# Patient Record
Sex: FEMALE | Race: WHITE | NOT HISPANIC OR LATINO | Employment: OTHER | URBAN - METROPOLITAN AREA
[De-identification: names, ages, dates, MRNs, and addresses within clinical notes are randomized per-mention and may not be internally consistent; named-entity substitution may affect disease eponyms.]

---

## 2017-01-18 ENCOUNTER — TRANSCRIBE ORDERS (OUTPATIENT)
Dept: MAMMOGRAPHY | Facility: HOSPITAL | Age: 62
End: 2017-01-18

## 2017-01-18 DIAGNOSIS — Z12.31 VISIT FOR SCREENING MAMMOGRAM: Primary | ICD-10-CM

## 2017-01-21 PROCEDURE — 87186 SC STD MICRODIL/AGAR DIL: CPT | Performed by: FAMILY MEDICINE

## 2017-01-21 PROCEDURE — 87077 CULTURE AEROBIC IDENTIFY: CPT | Performed by: FAMILY MEDICINE

## 2017-01-21 PROCEDURE — 87086 URINE CULTURE/COLONY COUNT: CPT | Performed by: FAMILY MEDICINE

## 2017-02-10 ENCOUNTER — HOSPITAL ENCOUNTER (OUTPATIENT)
Dept: MAMMOGRAPHY | Facility: HOSPITAL | Age: 62
Discharge: HOME | End: 2017-02-10
Attending: OBSTETRICS & GYNECOLOGY | Admitting: OBSTETRICS & GYNECOLOGY

## 2017-02-10 DIAGNOSIS — Z12.31 VISIT FOR SCREENING MAMMOGRAM: ICD-10-CM

## 2017-02-10 PROCEDURE — 77067 SCR MAMMO BI INCL CAD: CPT | Performed by: RADIOLOGY

## 2017-02-10 PROCEDURE — 77063 BREAST TOMOSYNTHESIS BI: CPT

## 2017-02-10 PROCEDURE — G0202 SCR MAMMO BI INCL CAD: HCPCS

## 2017-02-10 PROCEDURE — 77063 BREAST TOMOSYNTHESIS BI: CPT | Performed by: RADIOLOGY

## 2018-02-26 ENCOUNTER — TRANSCRIBE ORDERS (OUTPATIENT)
Dept: ADMINISTRATIVE | Facility: HOSPITAL | Age: 63
End: 2018-02-26

## 2018-02-26 DIAGNOSIS — Z12.31 VISIT FOR SCREENING MAMMOGRAM: Primary | ICD-10-CM

## 2018-03-13 ENCOUNTER — HOSPITAL ENCOUNTER (OUTPATIENT)
Dept: MAMMOGRAPHY | Facility: HOSPITAL | Age: 63
Discharge: HOME | End: 2018-03-13
Attending: INTERNAL MEDICINE | Admitting: OBSTETRICS & GYNECOLOGY

## 2018-03-13 DIAGNOSIS — Z12.31 VISIT FOR SCREENING MAMMOGRAM: ICD-10-CM

## 2018-03-13 PROCEDURE — 77063 BREAST TOMOSYNTHESIS BI: CPT

## 2018-03-13 PROCEDURE — 77063 BREAST TOMOSYNTHESIS BI: CPT | Performed by: RADIOLOGY

## 2018-03-13 PROCEDURE — 77067 SCR MAMMO BI INCL CAD: CPT

## 2018-03-13 PROCEDURE — 77067 SCR MAMMO BI INCL CAD: CPT | Performed by: RADIOLOGY

## 2018-05-10 ENCOUNTER — TRANSCRIBE ORDERS (OUTPATIENT)
Dept: ADMINISTRATIVE | Facility: HOSPITAL | Age: 63
End: 2018-05-10

## 2018-05-10 ENCOUNTER — HOSPITAL ENCOUNTER (OUTPATIENT)
Dept: GENERAL RADIOLOGY | Facility: HOSPITAL | Age: 63
Discharge: HOME | End: 2018-05-10
Attending: INTERNAL MEDICINE | Admitting: INTERNAL MEDICINE

## 2018-05-10 DIAGNOSIS — R07.89 CHEST WALL PAIN: ICD-10-CM

## 2018-05-10 DIAGNOSIS — R07.81 RIB PAIN ON LEFT SIDE: ICD-10-CM

## 2018-05-10 DIAGNOSIS — R05.9 COUGH: Primary | ICD-10-CM

## 2018-05-10 PROCEDURE — 71100 X-RAY EXAM RIBS UNI 2 VIEWS: CPT

## 2018-05-10 PROCEDURE — 71046 X-RAY EXAM CHEST 2 VIEWS: CPT

## 2018-10-16 ENCOUNTER — OFFICE VISIT (OUTPATIENT)
Dept: FAMILY MEDICINE CLINIC | Facility: CLINIC | Age: 63
End: 2018-10-16

## 2018-10-16 VITALS
HEART RATE: 78 BPM | SYSTOLIC BLOOD PRESSURE: 120 MMHG | BODY MASS INDEX: 26.05 KG/M2 | OXYGEN SATURATION: 98 % | HEIGHT: 70 IN | DIASTOLIC BLOOD PRESSURE: 80 MMHG | WEIGHT: 182 LBS

## 2018-10-16 DIAGNOSIS — Z23 NEED FOR HEPATITIS A VACCINATION: ICD-10-CM

## 2018-10-16 DIAGNOSIS — I10 ESSENTIAL HYPERTENSION: Primary | ICD-10-CM

## 2018-10-16 DIAGNOSIS — R10.9 LEFT SIDED ABDOMINAL PAIN: ICD-10-CM

## 2018-10-16 DIAGNOSIS — F32.5 MAJOR DEPRESSION IN COMPLETE REMISSION (HCC): ICD-10-CM

## 2018-10-16 PROCEDURE — 90471 IMMUNIZATION ADMIN: CPT | Performed by: FAMILY MEDICINE

## 2018-10-16 PROCEDURE — 90632 HEPA VACCINE ADULT IM: CPT | Performed by: FAMILY MEDICINE

## 2018-10-16 PROCEDURE — 99204 OFFICE O/P NEW MOD 45 MIN: CPT | Performed by: FAMILY MEDICINE

## 2018-10-16 RX ORDER — LOSARTAN POTASSIUM 50 MG/1
50 TABLET ORAL DAILY
Qty: 90 TABLET | Refills: 1 | Status: SHIPPED | OUTPATIENT
Start: 2018-10-16 | End: 2019-04-16 | Stop reason: SDUPTHER

## 2018-10-16 RX ORDER — ESCITALOPRAM OXALATE 20 MG/1
20 TABLET ORAL DAILY
Qty: 90 TABLET | Refills: 1 | Status: SHIPPED | OUTPATIENT
Start: 2018-10-16 | End: 2019-04-16 | Stop reason: SDUPTHER

## 2018-10-16 RX ORDER — LOSARTAN POTASSIUM 50 MG/1
50 TABLET ORAL DAILY
COMMUNITY
End: 2018-10-16 | Stop reason: SDUPTHER

## 2018-10-16 NOTE — PROGRESS NOTES
Keshav Gu presents today to establish care.    Chief Complaint   Patient presents with   • Establish Care   • Med Refill     needs Lexapro and Losartan        Depression   Visit Type: initial  Onset of symptoms: more than 5 years ago  Progression since onset: controlled  Patient presents with the following symptoms: weight gain.  Patient is not experiencing: anhedonia, depressed mood and palpitations.  Treatment tried: SSRI  Compliance with treatment: good  Improvement on treatment: significant      Hypertension   This is a chronic problem. Episode onset: 2 years. The problem is controlled. Pertinent negatives include no chest pain or palpitations. Risk factors for coronary artery disease include family history. Current antihypertension treatment includes angiotensin blockers.      Taking Lexapro 10 years. Started out on something else but had strange reaction, had vision changes.     PHQ-2/PHQ-9 Depression Screening 10/16/2018   Little interest or pleasure in doing things 0   Feeling down, depressed, or hopeless 0   Total Score 0     HTN:  Daily walks dog in Summer, 30 minutes. Works out with  once a week some cardio and weights. Careful about what she eats, mostly plant based diet and fish. Avoids red meat. When she was younger had palpitations, possibly due to anxiety. Prior testing Holter monitor 20-30 years ago normal twice. Family history mother with heart disease, she had additional testing 10 years ago and was fine.     Overall high cholesterol, good cholesterol was also high. Never treated with cholesterol medication.     Last March started severe pains in left side. Had xrays no problems. Pain went away. Thought could be Shingles, but never had rash. Recently travelled to [place], little bit of soreness on left side.           Review of Systems   Constitutional: Positive for unexpected weight gain.   HENT: Negative.    Eyes: Negative.    Respiratory: Negative.    Cardiovascular:  Negative.  Negative for chest pain and palpitations.   Gastrointestinal: Negative.    Endocrine: Negative.    Genitourinary: Negative.    Musculoskeletal: Negative.    Skin: Negative.    Neurological: Negative.    Hematological: Negative.    Psychiatric/Behavioral: Negative.  Negative for depressed mood.        Past Medical History:   Diagnosis Date   • Basal cell carcinoma     right cheek, left chest, left arm   • Colon polyp    • Depression    • Frequent UTI    • Hyperlipidemia    • Hypertension    • Interstitial cystitis    • Migraine headache         History reviewed. No pertinent surgical history.     Family History   Problem Relation Age of Onset   • Hyperlipidemia Mother    • Heart attack Mother    • Colon cancer Mother    • Mental illness Mother    • Arthritis Mother    • Stomach cancer Father    • Hypertension Father    • Stroke Maternal Grandmother    • Stroke Maternal Grandfather    • Stroke Paternal Grandmother    • Stroke Paternal Grandfather    • Breast cancer Neg Hx    • Ovarian cancer Neg Hx         Social History     Social History   • Marital status:      Spouse name: N/A   • Number of children: N/A   • Years of education: N/A     Occupational History   •       non-profit consulting     Social History Main Topics   • Smoking status: Never Smoker   • Smokeless tobacco: Never Used   • Alcohol use 0.6 oz/week     1 Glasses of wine per week   • Drug use: No   • Sexual activity: Defer     Other Topics Concern   • Not on file     Social History Narrative   • No narrative on file        Current Outpatient Prescriptions   Medication Sig Dispense Refill   • escitalopram (LEXAPRO) 20 MG tablet Take 20 mg by mouth Daily.     • losartan (COZAAR) 50 MG tablet Take 50 mg by mouth Daily.       No current facility-administered medications for this visit.        Allergies   Allergen Reactions   • Bactrim [Sulfamethoxazole-Trimethoprim] Hives   • Macrobid [Nitrofurantoin Macrocrystal] Itching and Swelling     "    Visit Vitals  /80 (BP Location: Left arm, Patient Position: Sitting, Cuff Size: Adult)   Pulse 78   Ht 177.8 cm (70\")   Wt 82.6 kg (182 lb)   SpO2 98%   BMI 26.11 kg/m²        Physical Exam   Constitutional: She is oriented to person, place, and time. No distress.   HENT:   Nose: Nose normal.   Mouth/Throat: Oropharynx is clear and moist.   Eyes: Pupils are equal, round, and reactive to light. Conjunctivae are normal.   Neck: Neck supple. No thyromegaly present.   Cardiovascular: Normal rate and regular rhythm.    No murmur heard.  Pulses:       Posterior tibial pulses are 2+ on the right side, and 2+ on the left side.   Pulmonary/Chest: Effort normal and breath sounds normal.   Abdominal: Soft. There is no hepatosplenomegaly. There is no tenderness.   Lymphadenopathy:     She has no cervical adenopathy.   Neurological: She is alert and oriented to person, place, and time.   Skin: Skin is warm and dry. No rash noted.   Psychiatric: She has a normal mood and affect. Her behavior is normal. Judgment and thought content normal.   Vitals reviewed.          Immunization History   Administered Date(s) Administered   • Hepatitis A 10/16/2018         Keshav was seen today for establish care and med refill.  Obtain prior records and immunizations.  Obtain recent labs earlier this year.  Diagnoses and all orders for this visit:    Essential hypertension  -     losartan (COZAAR) 50 MG tablet; Take 1 tablet by mouth Daily.  Stable.  Continue losartan.  Check fasting labs at next appointment.  Major depression in complete remission (CMS/HCC)  -     escitalopram (LEXAPRO) 20 MG tablet; Take 1 tablet by mouth Daily.  Stable.  Continue Lexapro.  Left sided abdominal pain  Recurrent. Normal exam.  Unclear etiology.  Need for hepatitis A vaccination  -     Hepatitis A Vaccine Adult IM        Return in about 6 months (around 4/16/2019) for Follow-up.  "

## 2018-10-17 ENCOUNTER — TELEPHONE (OUTPATIENT)
Dept: FAMILY MEDICINE CLINIC | Facility: CLINIC | Age: 63
End: 2018-10-17

## 2018-10-17 NOTE — TELEPHONE ENCOUNTER
----- Message from Vonda Alex MD sent at 10/16/2018  9:39 AM EDT -----  Regarding: req records  Last office note  Priya Kramer MD Consulting Physician Dermatology 10/16/2018 End  10/16/18  Phone: [phone]; Fax: [phone]    Last office note, pap, labs.   Juana Adams MD Consulting Physician Obstetrics and Gynecology 10/16/2018 End  10/16/18  Phone: [phone]; Fax: [phone]    Last office note, labs.   Edwin Jewell Jr., MD Consulting Physician Urology 10/16/2018 End  10/16/18  Phone: [phone]; Fax: [phone]    Last colonoscopy and pathology.   Gabriel Bae MD Consulting Physician Gastroenterology 10/16/2018 End  10/16/18  Phone: [phone]; Fax: [phone]    Immunizations at [facility]

## 2018-11-09 ENCOUNTER — HOSPITAL ENCOUNTER (OUTPATIENT)
Dept: GENERAL RADIOLOGY | Facility: HOSPITAL | Age: 63
Discharge: HOME | End: 2018-11-09
Admitting: FAMILY MEDICINE

## 2018-11-09 ENCOUNTER — OFFICE VISIT (OUTPATIENT)
Dept: FAMILY MEDICINE CLINIC | Facility: CLINIC | Age: 63
End: 2018-11-09

## 2018-11-09 VITALS
OXYGEN SATURATION: 98 % | DIASTOLIC BLOOD PRESSURE: 78 MMHG | HEART RATE: 67 BPM | HEIGHT: 70 IN | WEIGHT: 178 LBS | SYSTOLIC BLOOD PRESSURE: 116 MMHG | BODY MASS INDEX: 25.48 KG/M2

## 2018-11-09 DIAGNOSIS — M79.89 SWELLING OF LEFT FOOT: Primary | ICD-10-CM

## 2018-11-09 DIAGNOSIS — M79.89 SWELLING OF LEFT FOOT: ICD-10-CM

## 2018-11-09 PROCEDURE — 99214 OFFICE O/P EST MOD 30 MIN: CPT | Performed by: FAMILY MEDICINE

## 2018-11-09 PROCEDURE — 73630 X-RAY EXAM OF FOOT: CPT

## 2018-11-09 NOTE — PROGRESS NOTES
Chief Complaint   Patient presents with   • Foot Swelling     left foot pain on top        Lower Extremity Issue   This is a new problem. The current episode started 1 to 4 weeks ago. The problem occurs constantly. The problem has been gradually worsening. Associated symptoms include joint swelling. The symptoms are aggravated by walking. Treatments tried: voltaren gel. The treatment provided moderate relief.    No known injury. Swelling on top of left foot. Fells bony. Dull pain. Worse with walking shoes, can't wear laces too tight. When she flexed her foot and felt a pop. No heat or redness. No h/o similar problems. Tried prescription anti-inflammatory cream helped.              Review of Systems   Musculoskeletal: Positive for joint swelling.   Skin: Negative for color change.          Current Outpatient Prescriptions:   •  escitalopram (LEXAPRO) 20 MG tablet, Take 1 tablet by mouth Daily., Disp: 90 tablet, Rfl: 1  •  losartan (COZAAR) 50 MG tablet, Take 1 tablet by mouth Daily., Disp: 90 tablet, Rfl: 1     Allergies   Allergen Reactions   • Bactrim [Sulfamethoxazole-Trimethoprim] Hives   • Macrobid [Nitrofurantoin Macrocrystal] Itching and Swelling       Past Medical History:   Diagnosis Date   • Basal cell carcinoma     right cheek, left chest, left arm   • Colon polyp 12/01/2014   • Depression    • Frequent UTI    • Hyperlipidemia    • Hypertension    • Interstitial cystitis    • Migraine headache         No past surgical history on file.     Family History   Problem Relation Age of Onset   • Hyperlipidemia Mother    • Heart attack Mother    • Colon cancer Mother    • Mental illness Mother    • Arthritis Mother    • Heart disease Mother         hypertrophic cardiomyopathy   • Stomach cancer Father    • Hypertension Father    • Stroke Maternal Grandmother    • Stroke Maternal Grandfather    • Stroke Paternal Grandmother    • Stroke Paternal Grandfather    • Breast cancer Neg Hx    • Ovarian cancer Neg Hx      "    Social History     Social History   • Marital status:      Spouse name: N/A   • Number of children: N/A   • Years of education: N/A     Occupational History   •       non-profit consulting     Social History Main Topics   • Smoking status: Never Smoker   • Smokeless tobacco: Never Used   • Alcohol use 0.6 oz/week     1 Glasses of wine per week   • Drug use: No   • Sexual activity: Defer     Other Topics Concern   • Not on file     Social History Narrative   • No narrative on file        Visit Vitals  /78 (BP Location: Left arm, Patient Position: Sitting, Cuff Size: Adult)   Pulse 67   Ht 177.8 cm (70\")   Wt 80.7 kg (178 lb)   SpO2 98%   BMI 25.54 kg/m²        Physical Exam   Constitutional: No distress.   Cardiovascular: Normal rate and regular rhythm.    No murmur heard.  Pulmonary/Chest: Effort normal and breath sounds normal.   Musculoskeletal: She exhibits no edema.        Skin: Skin is warm and dry. No erythema.   Psychiatric: She has a normal mood and affect.   Vitals reviewed.           Keshav was seen today for foot swelling.    Diagnoses and all orders for this visit:    Swelling of left foot  -     XR Foot 3+ View Left; Future    New. Check xray, suspect bone abnormaliy rather than soft tissue. No signs of infection or need for antibiotics. Continue voltaren gel as needed.     Return if symptoms worsen or fail to improve.  "

## 2018-11-12 ENCOUNTER — TELEPHONE (OUTPATIENT)
Dept: FAMILY MEDICINE CLINIC | Facility: CLINIC | Age: 63
End: 2018-11-12

## 2018-11-12 DIAGNOSIS — M79.89 SWELLING OF LEFT FOOT: Primary | ICD-10-CM

## 2018-11-12 NOTE — TELEPHONE ENCOUNTER
Normal xrays and she needs an ultrasound to check swelling of her left mid foot. I do not want a vascular ultrasound, rather a soft tissue ultrasound.

## 2018-11-13 ENCOUNTER — HOSPITAL ENCOUNTER (OUTPATIENT)
Dept: ULTRASOUND IMAGING | Facility: HOSPITAL | Age: 63
Discharge: HOME | End: 2018-11-13
Admitting: FAMILY MEDICINE

## 2018-11-13 DIAGNOSIS — M79.89 SWELLING OF LEFT FOOT: ICD-10-CM

## 2018-11-13 PROCEDURE — 76882 US LMTD JT/FCL EVL NVASC XTR: CPT

## 2018-12-03 DIAGNOSIS — M79.675 PAIN AND SWELLING OF TOE OF LEFT FOOT: Primary | ICD-10-CM

## 2018-12-03 DIAGNOSIS — M79.89 PAIN AND SWELLING OF TOE OF LEFT FOOT: Primary | ICD-10-CM

## 2018-12-12 ENCOUNTER — OFFICE VISIT (OUTPATIENT)
Dept: ORTHOPEDIC SURGERY | Facility: CLINIC | Age: 63
End: 2018-12-12

## 2018-12-12 VITALS — BODY MASS INDEX: 25.25 KG/M2 | HEIGHT: 70 IN | HEART RATE: 74 BPM | WEIGHT: 176.37 LBS | OXYGEN SATURATION: 97 %

## 2018-12-12 DIAGNOSIS — R52 PAIN: Primary | ICD-10-CM

## 2018-12-12 DIAGNOSIS — R22.42 FOOT MASS, LEFT: ICD-10-CM

## 2018-12-12 PROCEDURE — 99203 OFFICE O/P NEW LOW 30 MIN: CPT | Performed by: ORTHOPAEDIC SURGERY

## 2018-12-12 NOTE — PROGRESS NOTES
NEW PATIENT    Patient: Keshav Gu  : 1955    Primary Care Provider: Vonda Garcia MD    Requesting Provider: As above    Pain of the Left Foot      History    Chief Complaint: Left foot mass    History of Present Illness: This is an extremely pleasant 63-year-old woman who is the daughter-in-law of one of my patients.  She has noted a bump on the dorsal aspect of the left foot since about October.  No history of injury.  She reports it really doesn't hurt unless his shoe pushes on it, she rates it as 1 out of 10, aching.  She does not remember having the bump previously, she is not aware of any similar problems in her family.  She is otherwise healthy.  She is a consultant for a nonprofit.  She had an x-ray with a marker on the lesion but it was nonweightbearing, I looked at it in the system and the report, no obvious abnormality in that area.  She also had an ultrasound, that showed a small mixed signal possibly cystic lesion in that area.  Nothing big enough that I think I could aspirate.    Current Outpatient Medications on File Prior to Visit   Medication Sig Dispense Refill   • diclofenac (VOLTAREN) 1 % gel gel Apply 4 g topically to the appropriate area as directed 4 (Four) Times a Day As Needed.     • escitalopram (LEXAPRO) 20 MG tablet Take 1 tablet by mouth Daily. 90 tablet 1   • losartan (COZAAR) 50 MG tablet Take 1 tablet by mouth Daily. 90 tablet 1     No current facility-administered medications on file prior to visit.       Allergies   Allergen Reactions   • Bactrim [Sulfamethoxazole-Trimethoprim] Hives   • Macrobid [Nitrofurantoin Macrocrystal] Itching and Swelling      Past Medical History:   Diagnosis Date   • Basal cell carcinoma     right cheek, left chest, left arm   • Colon polyp 2014   • Depression    • Frequent UTI    • Hyperlipidemia    • Hypertension    • Interstitial cystitis    • Migraine headache      History reviewed. No pertinent surgical  history.  Family History   Problem Relation Age of Onset   • Hyperlipidemia Mother    • Heart attack Mother    • Colon cancer Mother    • Mental illness Mother    • Arthritis Mother    • Heart disease Mother         hypertrophic cardiomyopathy   • Stomach cancer Father    • Hypertension Father    • Stroke Maternal Grandmother    • Stroke Maternal Grandfather    • Stroke Paternal Grandmother    • Stroke Paternal Grandfather    • Breast cancer Neg Hx    • Ovarian cancer Neg Hx       Social History     Socioeconomic History   • Marital status:      Spouse name: Not on file   • Number of children: Not on file   • Years of education: Not on file   • Highest education level: Not on file   Social Needs   • Financial resource strain: Not on file   • Food insecurity - worry: Not on file   • Food insecurity - inability: Not on file   • Transportation needs - medical: Not on file   • Transportation needs - non-medical: Not on file   Occupational History     Comment: non-profit consulting   Tobacco Use   • Smoking status: Never Smoker   • Smokeless tobacco: Never Used   Substance and Sexual Activity   • Alcohol use: Yes     Alcohol/week: 0.6 oz     Types: 1 Glasses of wine per week   • Drug use: No   • Sexual activity: Defer   Other Topics Concern   • Not on file   Social History Narrative   • Not on file        Review of Systems   Constitutional: Negative.    HENT: Positive for congestion, postnasal drip and sinus pressure.    Eyes: Negative.    Respiratory: Negative.    Cardiovascular: Negative.    Gastrointestinal: Negative.    Endocrine: Negative.    Genitourinary: Negative.    Musculoskeletal: Positive for arthralgias.   Skin: Negative.    Allergic/Immunologic: Positive for environmental allergies.   Neurological: Negative.    Hematological: Negative.    Psychiatric/Behavioral: Negative.        The following portions of the patient's history were reviewed and updated as appropriate: allergies, current medications,  "past family history, past medical history, past social history, past surgical history and problem list.    Physical Exam:   Pulse 74   Ht 177.8 cm (70\")   Wt 80 kg (176 lb 5.9 oz)   SpO2 97%   BMI 25.31 kg/m²   GENERAL: Body habitus: normal weight for height    Lower extremity edema: Right: none; Leftt: none    Varicose veins:  Right: none; Left: none    Gait: normal     Mental Status:  awake and alert; oriented to person, place, and time    Voice:  clear  SKIN:  Normal and warm and dry    Hair Growth:  Right:normal; Left:  normal  NAILS: Toenails: normal  HEENT: Head: Normocephalic, atraumatic,  without obvious abnormality.  eye: normal external eye, no icterus  ears: normal external ears  nose: normal external nose  pharynx: dental hygiene adequate  PULM:  Repiratory effort normal  CV:  Dorsalis Pedis:  Right: 2+; Left:2+    Posterior Tibial: Right:2+; Left:2+    Capillary Refill:  Brisk  MSK:  Hand:right handed and sensation intact      Tibia:  Right:  non tender; Left:  non tender      Ankle:  Right: non tender, ROM  normal and symmetric and motor function  normal; Left:  non tender, ROM  normal and symmetric and motor function  normal      Foot:  Right:  No tenderness in the right foot, I can feel a smaller dorsal boss at the proximal base of the first metatarsal; Left:  She has a bony boss at the dorsal base of the first metatarsal, with a rubbery nodule over it.  No Tinel's, no tenderness, it is not adherent to the tendons, it is not pulsatile, no other areas of prominence      NEURO: Heel Walking:  Right:  normal; Left:  normal    Toe Walking:  Right:  normal; Left:  normal     [place] 5.07 monofilament test: normal    Lower extremity sensation: intact     Reflexes:  Biceps:  Right:  1+; Left:  1+           Quads:  Right:  2+; Left:  2+           Ankle:  Right:  1+; Left:  1+      Calf Atrophy:none    Motor Function: all 5/5         Medical Decision Making    Data Review:   ordered and reviewed " x-rays today, reviewed radiology images and reviewed radiology results    Assessment and Plan/ Diagnosis/Treatment options:   1. Foot mass, left  I think this is probably a dorsal boss, coupled with a small capsular cyst.  The standing films show the bony bump on either side of the first tarsometatarsal joint, and there is slight narrowing there, I suspect that the cyst is not a true ganglion but rather capsular cyst which is common.  It is not big enough that I think I could aspirate it.  There is no calcification in the area, nothing ominous.  I think the best thing to do is an MRI.  This will give us a better look at the area, it will tell us if it's a capsular cyst.  She agrees.  I will see her after the MRI.  - MRI Foot Left With & Without Contrast; Future

## 2018-12-20 ENCOUNTER — HOSPITAL ENCOUNTER (OUTPATIENT)
Dept: MRI IMAGING | Facility: HOSPITAL | Age: 63
Discharge: HOME | End: 2018-12-20
Attending: ORTHOPAEDIC SURGERY | Admitting: ORTHOPAEDIC SURGERY

## 2018-12-20 DIAGNOSIS — R22.42 FOOT MASS, LEFT: ICD-10-CM

## 2018-12-20 DIAGNOSIS — R52 PAIN: ICD-10-CM

## 2018-12-20 PROCEDURE — 0 GADOBENATE DIMEGLUMINE 529 MG/ML SOLUTION: Performed by: ORTHOPAEDIC SURGERY

## 2018-12-20 PROCEDURE — 73720 MRI LWR EXTREMITY W/O&W/DYE: CPT

## 2018-12-20 PROCEDURE — A9577 INJ MULTIHANCE: HCPCS | Performed by: ORTHOPAEDIC SURGERY

## 2018-12-20 PROCEDURE — 82565 ASSAY OF CREATININE: CPT

## 2018-12-20 RX ADMIN — GADOBENATE DIMEGLUMINE 15 ML: 529 INJECTION, SOLUTION INTRAVENOUS at 14:17

## 2018-12-21 LAB — CREAT BLDA-MCNC: 0.7 MG/DL (ref 0.6–1.3)

## 2018-12-24 ENCOUNTER — OFFICE VISIT (OUTPATIENT)
Dept: ORTHOPEDIC SURGERY | Facility: CLINIC | Age: 63
End: 2018-12-24

## 2018-12-24 VITALS — BODY MASS INDEX: 25.25 KG/M2 | OXYGEN SATURATION: 98 % | HEART RATE: 90 BPM | HEIGHT: 70 IN | WEIGHT: 176.37 LBS

## 2018-12-24 DIAGNOSIS — M19.072 OSTEOARTHRITIS OF LEFT ANKLE OR FOOT: ICD-10-CM

## 2018-12-24 DIAGNOSIS — R22.42 FOOT MASS, LEFT: Primary | ICD-10-CM

## 2018-12-24 PROCEDURE — 20605 DRAIN/INJ JOINT/BURSA W/O US: CPT | Performed by: ORTHOPAEDIC SURGERY

## 2018-12-24 PROCEDURE — 99213 OFFICE O/P EST LOW 20 MIN: CPT | Performed by: ORTHOPAEDIC SURGERY

## 2018-12-24 RX ORDER — METHYLPREDNISOLONE ACETATE 40 MG/ML
40 INJECTION, SUSPENSION INTRA-ARTICULAR; INTRALESIONAL; INTRAMUSCULAR; SOFT TISSUE
Status: COMPLETED | OUTPATIENT
Start: 2018-12-24 | End: 2018-12-24

## 2018-12-24 RX ORDER — BUPIVACAINE HYDROCHLORIDE 2.5 MG/ML
0.5 INJECTION, SOLUTION INFILTRATION; PERINEURAL
Status: COMPLETED | OUTPATIENT
Start: 2018-12-24 | End: 2018-12-24

## 2018-12-24 RX ADMIN — METHYLPREDNISOLONE ACETATE 40 MG: 40 INJECTION, SUSPENSION INTRA-ARTICULAR; INTRALESIONAL; INTRAMUSCULAR; SOFT TISSUE at 09:20

## 2018-12-24 RX ADMIN — BUPIVACAINE HYDROCHLORIDE 0.5 ML: 2.5 INJECTION, SOLUTION INFILTRATION; PERINEURAL at 09:20

## 2018-12-24 NOTE — PROGRESS NOTES
Procedure   Medium Joint Arthrocentesis  Date/Time: 12/24/2018 9:20 AM  Consent given by: patient  Site marked: site marked  Timeout: Immediately prior to procedure a time out was called to verify the correct patient, procedure, equipment, support staff and site/side marked as required   Supporting Documentation  Indications: pain   Procedure Details  Location: Left Foot TMT.  Preparation: Patient was prepped and draped in the usual sterile fashion  Approach: dorsal  Medications administered: 0.5 mL bupivacaine 0.25 %; 40 mg methylPREDNISolone acetate 40 MG/ML  Analysis: fluid sample sent for laboratory analysis

## 2018-12-24 NOTE — PROGRESS NOTES
ESTABLISHED PATIENT    Patient: Keshav Gu  : 1955    Primary Care Provider: Vonda Garcia MD    Requesting Provider: As above    Follow-up of the Left Foot (After MRI 2018)      History    Chief Complaint: Left foot pain    History of Present Illness: She returns for follow-up after the left foot MRI, 18.  The MRI confirms what I thought, she has a dorsal boss and osteophytes at the first tarsometatarsal joint, with a thin pseudocyst over this.  I discussed this with her and showed her the films.  I explained this is not really a ganglion cyst, it's some thickening over the joint capsule, irritation over the dorsal boss.  We discussed the options.  One thing we could do is simply watch this, we could also inject cyst and the joint, or surgically I could shave the bone down, or I could fuse the joint.    Current Outpatient Medications on File Prior to Visit   Medication Sig Dispense Refill   • diclofenac (VOLTAREN) 1 % gel gel Apply 4 g topically to the appropriate area as directed 4 (Four) Times a Day As Needed.     • escitalopram (LEXAPRO) 20 MG tablet Take 1 tablet by mouth Daily. 90 tablet 1   • losartan (COZAAR) 50 MG tablet Take 1 tablet by mouth Daily. 90 tablet 1     No current facility-administered medications on file prior to visit.       Allergies   Allergen Reactions   • Bactrim [Sulfamethoxazole-Trimethoprim] Hives   • Macrobid [Nitrofurantoin Macrocrystal] Itching and Swelling      Past Medical History:   Diagnosis Date   • Basal cell carcinoma     right cheek, left chest, left arm   • Colon polyp 2014   • Depression    • Frequent UTI    • Hyperlipidemia    • Hypertension    • Interstitial cystitis    • Migraine headache      No past surgical history on file.  Family History   Problem Relation Age of Onset   • Hyperlipidemia Mother    • Heart attack Mother    • Colon cancer Mother    • Mental illness Mother    • Arthritis Mother    • Heart disease Mother  "        hypertrophic cardiomyopathy   • Stomach cancer Father    • Hypertension Father    • Stroke Maternal Grandmother    • Stroke Maternal Grandfather    • Stroke Paternal Grandmother    • Stroke Paternal Grandfather    • Breast cancer Neg Hx    • Ovarian cancer Neg Hx       Social History     Socioeconomic History   • Marital status:      Spouse name: Not on file   • Number of children: Not on file   • Years of education: Not on file   • Highest education level: Not on file   Social Needs   • Financial resource strain: Not on file   • Food insecurity - worry: Not on file   • Food insecurity - inability: Not on file   • Transportation needs - medical: Not on file   • Transportation needs - non-medical: Not on file   Occupational History     Comment: non-profit consulting   Tobacco Use   • Smoking status: Never Smoker   • Smokeless tobacco: Never Used   Substance and Sexual Activity   • Alcohol use: Yes     Alcohol/week: 0.6 oz     Types: 1 Glasses of wine per week   • Drug use: No   • Sexual activity: Defer   Other Topics Concern   • Not on file   Social History Narrative   • Not on file        Review of Systems   Constitutional: Negative.    HENT: Negative.    Eyes: Negative.    Respiratory: Negative.    Cardiovascular: Negative.    Gastrointestinal: Negative.    Endocrine: Negative.    Genitourinary: Negative.    Musculoskeletal: Positive for arthralgias.   Skin: Negative.    Allergic/Immunologic: Negative.    Neurological: Negative.    Hematological: Negative.    Psychiatric/Behavioral: Negative.        The following portions of the patient's history were reviewed and updated as appropriate: allergies, current medications, past family history, past medical history, past social history, past surgical history and problem list.    Physical Exam:   Pulse 90   Ht 177 cm (69.69\")   Wt 80 kg (176 lb 5.9 oz)   SpO2 98%   BMI 25.54 kg/m²   GENERAL: Gait: normal       MSK:  Ankle:  Right: non tender; Left:  " non tender        Foot:  Right:  non tender; Left:  No change in the prominence at the dorsal first tarsometatarsal joint    NEURO Sensation:  intact     Medical Decision Making    Data Review:   reviewed radiology images and reviewed radiology results    Assessment/Plan/Diagnosis/Treatment Options:   1. Foot mass, left  This is a pseudocyst or capsular cyst over osteophytes and a dorsal boss, she does have some underlying arthritis in the first tarsometatarsal joint.  We discussed the options as noted above.  She would like to try injection.  She does not think it hurts enough for surgery.    After discussing the risks (including infection, skin atrophy, etc), time out was called,  the left 1st tarsal metatarsal joint was prepped and using sterile technique injected with 1cc of 0.25% ropivacaine and 1cc (80mg) DepoMedrol.  A band aid was applied.  No complications.       I'll be happy to see her any time      2. Osteoarthritis of left ankle or foot  As above she has some arthritis in the first TMT

## 2019-02-21 ENCOUNTER — TRANSCRIBE ORDERS (OUTPATIENT)
Dept: ADMINISTRATIVE | Facility: HOSPITAL | Age: 64
End: 2019-02-21

## 2019-02-21 DIAGNOSIS — Z12.31 VISIT FOR SCREENING MAMMOGRAM: Primary | ICD-10-CM

## 2019-03-01 ENCOUNTER — OFFICE VISIT (OUTPATIENT)
Dept: FAMILY MEDICINE CLINIC | Facility: CLINIC | Age: 64
End: 2019-03-01

## 2019-03-01 VITALS
HEIGHT: 70 IN | TEMPERATURE: 97.8 F | BODY MASS INDEX: 26.05 KG/M2 | WEIGHT: 182 LBS | HEART RATE: 79 BPM | DIASTOLIC BLOOD PRESSURE: 68 MMHG | SYSTOLIC BLOOD PRESSURE: 116 MMHG | OXYGEN SATURATION: 97 %

## 2019-03-01 DIAGNOSIS — J06.9 ACUTE URI: Primary | ICD-10-CM

## 2019-03-01 DIAGNOSIS — R20.2 PARESTHESIA: ICD-10-CM

## 2019-03-01 PROCEDURE — 99213 OFFICE O/P EST LOW 20 MIN: CPT | Performed by: FAMILY MEDICINE

## 2019-03-01 NOTE — PROGRESS NOTES
Chief Complaint   Patient presents with   • Cough     drainage, hx of bronchitis   • Flank Pain     left side, uncomfortable x 3 months        Cough   This is a new problem. The current episode started in the past 7 days. The problem has been gradually improving. The cough is non-productive. Associated symptoms include nasal congestion, postnasal drip and shortness of breath. Pertinent negatives include no fever or wheezing. Exacerbated by: weather. Treatments tried: claritin. The treatment provided mild relief. Her past medical history is significant for bronchitis.   Flank Pain   This is a recurrent problem. The current episode started more than 1 month ago (3 months). The problem occurs intermittently. The pain is present in the thoracic spine (left ribs). Quality: sensitive. Exacerbated by: tie robe in morning, water in shower. Pertinent negatives include no fever.      Tickle in throat.     Left side more prevalent when gassy. No change in bowel habits. Years ago chiropractor said if spine was straighter than she'd be and inch taller.     Review of Systems   Constitutional: Negative for fever.   HENT: Positive for postnasal drip.    Respiratory: Positive for cough and shortness of breath. Negative for wheezing.    Gastrointestinal: Negative for constipation and diarrhea.   Genitourinary: Positive for flank pain.   Neurological: Positive for headache.        Current Outpatient Medications on File Prior to Visit   Medication Sig Dispense Refill   • diclofenac (VOLTAREN) 1 % gel gel Apply 4 g topically to the appropriate area as directed 4 (Four) Times a Day As Needed.     • escitalopram (LEXAPRO) 20 MG tablet Take 1 tablet by mouth Daily. 90 tablet 1   • losartan (COZAAR) 50 MG tablet Take 1 tablet by mouth Daily. 90 tablet 1     No current facility-administered medications on file prior to visit.        Allergies   Allergen Reactions   • Bactrim [Sulfamethoxazole-Trimethoprim] Hives   • Macrobid [Nitrofurantoin  Macrocrystal] Itching and Swelling       Past Medical History:   Diagnosis Date   • Basal cell carcinoma     right cheek, left chest, left arm   • Colon polyp 12/01/2014   • Depression    • Elevated hemoglobin A1c 01/17/2017    5.7   • Frequent UTI    • Hyperlipidemia    • Hypertension 01/17/2017   • Interstitial cystitis    • Migraine headache 01/31/2014   • Osteopenia    • Plantar fasciitis 06/08/2009   • Scoliosis of thoracic spine 2018    xrays   • T8 vertebral fracture (CMS/HCC) 2010   • Vitamin D deficiency 11/25/2015        Past Surgical History:   Procedure Laterality Date   • SKIN CANCER EXCISION          Family History   Problem Relation Age of Onset   • Hyperlipidemia Mother    • Heart attack Mother    • Colon cancer Mother    • Mental illness Mother    • Arthritis Mother    • Heart disease Mother         hypertrophic cardiomyopathy   • Stomach cancer Father    • Hypertension Father    • Stroke Maternal Grandmother    • Stroke Maternal Grandfather    • Stroke Paternal Grandmother    • Stroke Paternal Grandfather    • Breast cancer Neg Hx    • Ovarian cancer Neg Hx         Social History     Socioeconomic History   • Marital status:      Spouse name: Not on file   • Number of children: Not on file   • Years of education: Not on file   • Highest education level: Not on file   Social Needs   • Financial resource strain: Not on file   • Food insecurity - worry: Not on file   • Food insecurity - inability: Not on file   • Transportation needs - medical: Not on file   • Transportation needs - non-medical: Not on file   Occupational History     Comment: non-profit consulting   Tobacco Use   • Smoking status: Never Smoker   • Smokeless tobacco: Never Used   Substance and Sexual Activity   • Alcohol use: Yes     Alcohol/week: 0.6 oz     Types: 1 Glasses of wine per week   • Drug use: No   • Sexual activity: Defer   Other Topics Concern   • Not on file   Social History Narrative   • Not on file        Visit  "Vitals  /68 (BP Location: Left arm, Patient Position: Sitting, Cuff Size: Adult)   Pulse 79   Temp 97.8 °F (36.6 °C)   Ht 177.8 cm (70\")   Wt 82.6 kg (182 lb)   SpO2 97%   BMI 26.11 kg/m²        Physical Exam   Constitutional: No distress.   HENT:   Right Ear: Ear canal normal. Tympanic membrane is not erythematous and not bulging. A middle ear effusion ( trace, clear) is present.   Left Ear: Ear canal normal. Tympanic membrane is not erythematous and not bulging. A middle ear effusion ( trace, clear) is present.   Nose: Mucosal edema ( R>L) present. No rhinorrhea.   Mouth/Throat: Oropharynx is clear and moist and mucous membranes are normal. No oral lesions. Tonsils are 0 on the right. Tonsils are 0 on the left. No tonsillar exudate.   Cardiovascular: Normal rate and regular rhythm.   No murmur heard.  Pulmonary/Chest: Effort normal and breath sounds normal.       Lymphadenopathy:        Head (right side): No submandibular, no preauricular and no posterior auricular adenopathy present.        Head (left side): No submandibular, no preauricular and no posterior auricular adenopathy present.     She has no cervical adenopathy.   Psychiatric: She has a normal mood and affect. Her behavior is normal. Judgment and thought content normal.   Vitals reviewed.      EXAMINATION: XR CHEST PA AND LATERAL-, XR RIBS 2 VW LEFT- 05/10/2018     INDICATION: R05-Cough; R07.89-Other chest pain      COMPARISON: None     FINDINGS: Cardiac size within normal limits. No focal opacification or  consolidation. No pneumothorax or pleural effusion. Scoliotic curvature  of the thoracolumbar spine again noted.         Separate order for left rib series without displaced rib fracture  identified or acute osseous abnormality of the left chest wall.     IMPRESSION:  No acute cardiopulmonary process with scoliotic curvature of  the thoracolumbar spine. No acute osseous abnormality however  specifically no displaced rib fracture. No " pneumothorax.     D:  05/10/2018  E:  05/10/2018       Keshav was seen today for cough and flank pain.    Diagnoses and all orders for this visit:    Acute URI  New.  Likely viral.  Discussed dextromethorphan containing cough syrup to help suppress cough or a prescription for Tessalon Perles.  No need for antibiotics at this time.  Paresthesia  Chronic.  Reviewed x-rays which did not show any abnormality in the area of concern, it did however mention mild scoliosis.  Recommend trial of over-the-counter capsaicin cream.  If she is not improving consider starting neuro cream from compounding pharmacy.      Return if symptoms worsen or fail to improve.

## 2019-04-16 ENCOUNTER — OFFICE VISIT (OUTPATIENT)
Dept: FAMILY MEDICINE CLINIC | Facility: CLINIC | Age: 64
End: 2019-04-16

## 2019-04-16 VITALS
SYSTOLIC BLOOD PRESSURE: 122 MMHG | DIASTOLIC BLOOD PRESSURE: 80 MMHG | HEART RATE: 68 BPM | OXYGEN SATURATION: 99 % | BODY MASS INDEX: 25.2 KG/M2 | WEIGHT: 176 LBS | HEIGHT: 70 IN

## 2019-04-16 DIAGNOSIS — J30.89 NON-SEASONAL ALLERGIC RHINITIS, UNSPECIFIED TRIGGER: ICD-10-CM

## 2019-04-16 DIAGNOSIS — I10 ESSENTIAL HYPERTENSION: Primary | ICD-10-CM

## 2019-04-16 DIAGNOSIS — F33.0 MILD EPISODE OF RECURRENT DEPRESSIVE DISORDER (HCC): ICD-10-CM

## 2019-04-16 DIAGNOSIS — R07.9 LEFT SIDED CHEST PAIN: ICD-10-CM

## 2019-04-16 DIAGNOSIS — Z23 NEED FOR VACCINATION AGAINST HEPATITIS A: ICD-10-CM

## 2019-04-16 PROCEDURE — 99214 OFFICE O/P EST MOD 30 MIN: CPT | Performed by: FAMILY MEDICINE

## 2019-04-16 PROCEDURE — 90471 IMMUNIZATION ADMIN: CPT | Performed by: FAMILY MEDICINE

## 2019-04-16 PROCEDURE — 90632 HEPA VACCINE ADULT IM: CPT | Performed by: FAMILY MEDICINE

## 2019-04-16 RX ORDER — FEXOFENADINE HCL 180 MG/1
180 TABLET ORAL DAILY
COMMUNITY
End: 2020-02-04

## 2019-04-16 RX ORDER — ESCITALOPRAM OXALATE 20 MG/1
20 TABLET ORAL DAILY
Qty: 90 TABLET | Refills: 1 | Status: SHIPPED | OUTPATIENT
Start: 2019-04-16 | End: 2019-10-21 | Stop reason: SDUPTHER

## 2019-04-16 RX ORDER — LOSARTAN POTASSIUM 50 MG/1
50 TABLET ORAL DAILY
Qty: 90 TABLET | Refills: 1 | Status: SHIPPED | OUTPATIENT
Start: 2019-04-16 | End: 2019-10-21

## 2019-04-16 NOTE — PROGRESS NOTES
Chief Complaint   Patient presents with   • Hypertension   • Depression        Hypertension   This is a chronic problem. The problem is controlled. Pertinent negatives include no palpitations. Current antihypertension treatment includes angiotensin blockers.   Depression   Visit Type: follow-up  Patient presents with the following symptoms: anhedonia, decreased concentration and depressed mood.  Patient is not experiencing: palpitations and suicidal ideas.       PHQ-2/PHQ-9 Depression Screening 4/16/2019   Little interest or pleasure in doing things 1   Feeling down, depressed, or hopeless 1   Trouble falling or staying asleep, or sleeping too much 1   Feeling tired or having little energy 0   Poor appetite or overeating 2   Feeling bad about yourself - or that you are a failure or have let yourself or your family down 0   Trouble concentrating on things, such as reading the newspaper or watching television 1   Moving or speaking so slowly that other people could have noticed. Or the opposite - being so fidgety or restless that you have been moving around a lot more than usual 0   Thoughts that you would be better off dead, or of hurting yourself in some way 0   Total Score 6   If you checked off any problems, how difficult have these problems made it for you to do your work, take care of things at home, or get along with other people? Somewhat difficult       Home blood pressure doing well.     Dog was put down 8 days ago. Couldn't eat, torn up feeling. Felt blood pressure was high with high heart rate and stress level. She has been dog sitting daughter's dog. Trying to exercise and walking for self-care and eating normally. Prior to loss of dog Lexapro was helping.     Side pain:  Still has pain/discomfort on left side. Lessening. Often sleeps on her side, when she just wakes up she feels it then and doesn't notice it the rest of the day.    Allergies:  Allergy drainage in nose more than post-nasal drainage. Right  nostril irritated, feels need to blow nose and a little bloody with scab. Left nostril inside the tip feels small bump. Switched from claritin to allegra.     Review of Systems   Constitutional: Positive for appetite change.   HENT: Positive for congestion, nosebleeds, rhinorrhea and sneezing. Negative for postnasal drip.    Cardiovascular: Negative for palpitations.   Psychiatric/Behavioral: Positive for decreased concentration, sleep disturbance, depressed mood and stress. Negative for suicidal ideas.        Current Outpatient Medications on File Prior to Visit   Medication Sig Dispense Refill   • diclofenac (VOLTAREN) 1 % gel gel Apply 4 g topically to the appropriate area as directed 4 (Four) Times a Day As Needed.     • fexofenadine (ALLEGRA) 180 MG tablet Take 180 mg by mouth Daily.     • [DISCONTINUED] escitalopram (LEXAPRO) 20 MG tablet Take 1 tablet by mouth Daily. 90 tablet 1   • [DISCONTINUED] losartan (COZAAR) 50 MG tablet Take 1 tablet by mouth Daily. 90 tablet 1     No current facility-administered medications on file prior to visit.        Allergies   Allergen Reactions   • Bactrim [Sulfamethoxazole-Trimethoprim] Hives   • Macrobid [Nitrofurantoin Macrocrystal] Itching and Swelling       Past Medical History:   Diagnosis Date   • Basal cell carcinoma     right cheek, left chest, left arm   • Colon polyp 12/01/2014   • Depression    • Elevated hemoglobin A1c 01/17/2017    5.7   • Frequent UTI    • Hyperlipidemia    • Hypertension 01/17/2017   • Interstitial cystitis    • Migraine headache 01/31/2014   • Osteopenia    • Plantar fasciitis 06/08/2009   • Scoliosis of thoracic spine 2018    xrays   • T8 vertebral fracture (CMS/HCC) 2010   • Vitamin D deficiency 11/25/2015        Past Surgical History:   Procedure Laterality Date   • SKIN CANCER EXCISION          Family History   Problem Relation Age of Onset   • Hyperlipidemia Mother    • Heart attack Mother    • Colon cancer Mother    • Mental illness  "Mother    • Arthritis Mother    • Heart disease Mother         hypertrophic cardiomyopathy   • Stomach cancer Father    • Hypertension Father    • Stroke Maternal Grandmother    • Stroke Maternal Grandfather    • Stroke Paternal Grandmother    • Stroke Paternal Grandfather    • Breast cancer Neg Hx    • Ovarian cancer Neg Hx         Social History     Socioeconomic History   • Marital status:      Spouse name: Not on file   • Number of children: Not on file   • Years of education: Not on file   • Highest education level: Not on file   Occupational History     Comment: non-profit consulting   Tobacco Use   • Smoking status: Never Smoker   • Smokeless tobacco: Never Used   Substance and Sexual Activity   • Alcohol use: Yes     Alcohol/week: 0.6 oz     Types: 1 Glasses of wine per week   • Drug use: No   • Sexual activity: Defer        Visit Vitals  /80 (BP Location: Left arm, Patient Position: Sitting, Cuff Size: Adult)   Pulse 68   Ht 177.8 cm (70\")   Wt 79.8 kg (176 lb)   SpO2 99%   BMI 25.25 kg/m²        Physical Exam   Constitutional: No distress.   HENT:   Nose: Mucosal edema ( Right mild hypertrophy with erythema and dryness.) present. No rhinorrhea. No epistaxis.   Cardiovascular: Normal rate and regular rhythm.   No murmur heard.  Pulmonary/Chest: Effort normal and breath sounds normal.       Psychiatric: She has a normal mood and affect.   Vitals reviewed.    Immunization History   Administered Date(s) Administered   • Flu Vaccine Quad PF >18YRS 11/20/2015, 11/15/2016, 10/17/2017   • Hepatitis A 10/16/2018, 04/16/2019   • Tdap 06/08/2009            Keshav was seen today for hypertension and depression.    Diagnoses and all orders for this visit:    Essential hypertension  -     losartan (COZAAR) 50 MG tablet; Take 1 tablet by mouth Daily.  Stable.  Continue losartan.  Mild episode of recurrent depressive disorder (CMS/HCC)  -     escitalopram (LEXAPRO) 20 MG tablet; Take 1 tablet by mouth " Daily.  Recent worsening with grief reaction.  Continue Lexapro 20 mg.  Recommend continuing self care activities.  Non-seasonal allergic rhinitis, unspecified trigger  New.  Recommend continuing Allegra.  Recommend nasal saline to help reduce allergens as well as moisturize nasal passages.  Consider adding nasal steroid such as Flonase if not improving with Allegra.  Left sided chest pain  Undetermined etiology.  Patient reports the pain is mostly so she was sleeping and first thing in the morning.  It resolves during the day.  Need for vaccination against hepatitis A  -     Hepatitis A Vaccine Adult IM        Return in about 6 months (around 10/16/2019) for Follow-up.

## 2019-04-17 ENCOUNTER — APPOINTMENT (OUTPATIENT)
Dept: MAMMOGRAPHY | Facility: HOSPITAL | Age: 64
End: 2019-04-17

## 2019-10-21 ENCOUNTER — OFFICE VISIT (OUTPATIENT)
Dept: FAMILY MEDICINE CLINIC | Facility: CLINIC | Age: 64
End: 2019-10-21

## 2019-10-21 VITALS
SYSTOLIC BLOOD PRESSURE: 124 MMHG | BODY MASS INDEX: 26.14 KG/M2 | DIASTOLIC BLOOD PRESSURE: 86 MMHG | HEART RATE: 74 BPM | OXYGEN SATURATION: 92 % | HEIGHT: 70 IN | WEIGHT: 182.6 LBS

## 2019-10-21 DIAGNOSIS — F32.5 MAJOR DEPRESSION IN COMPLETE REMISSION (HCC): ICD-10-CM

## 2019-10-21 DIAGNOSIS — I10 ESSENTIAL HYPERTENSION: Primary | ICD-10-CM

## 2019-10-21 PROCEDURE — 99213 OFFICE O/P EST LOW 20 MIN: CPT | Performed by: FAMILY MEDICINE

## 2019-10-21 RX ORDER — ESCITALOPRAM OXALATE 10 MG/1
10 TABLET ORAL DAILY
Qty: 90 TABLET | Refills: 1 | Status: SHIPPED | OUTPATIENT
Start: 2019-10-21 | End: 2020-04-21 | Stop reason: SDUPTHER

## 2019-10-21 RX ORDER — LOSARTAN POTASSIUM 50 MG
50 TABLET ORAL DAILY
Qty: 90 TABLET | Refills: 1 | Status: SHIPPED | OUTPATIENT
Start: 2019-10-21 | End: 2019-12-05 | Stop reason: SDUPTHER

## 2019-10-21 NOTE — PROGRESS NOTES
Chief Complaint   Patient presents with   • Hypertension     discuss losartan because of recall   • Depression     discuss cutting back on antidepressant        Hypertension   This is a chronic problem. Current antihypertension treatment includes angiotensin blockers.   Depression   Visit Type: follow-up  Patient is not experiencing: depressed mood.         PHQ-2/PHQ-9 Depression Screening 10/21/2019   Little interest or pleasure in doing things 0   Feeling down, depressed, or hopeless 0   Trouble falling or staying asleep, or sleeping too much -   Feeling tired or having little energy -   Poor appetite or overeating -   Feeling bad about yourself - or that you are a failure or have let yourself or your family down -   Trouble concentrating on things, such as reading the newspaper or watching television -   Moving or speaking so slowly that other people could have noticed. Or the opposite - being so fidgety or restless that you have been moving around a lot more than usual -   Thoughts that you would be better off dead, or of hurting yourself in some way -   Total Score 0   If you checked off any problems, how difficult have these problems made it for you to do your work, take care of things at home, or get along with other people? -       Concerned about losartan recall. Occasionally sinus headaches.     She had always taken Lexapro around menopause 10 years. 3.5-4 years she was under stress with late 's alcoholism. She's not sure she needs 20mg of lexapro anymore. Concerned about antidepressants and dementia. Super strange dreams when she missed dose of lexapro.   Frustrating when she can't find the words she wants, if brain will go to mush. Less than smooth talker, has to think about a word.     No longer having foot tingling. Top of right foot. Has faux cyst on top of left foot mass. Boots and shoes sometimes rub. Had steroid shot helped pain. Doesn't want to shave bone off or break bones in her foot.           Review of Systems   Neurological: Positive for headache.   Psychiatric/Behavioral: Negative for depressed mood.        Current Outpatient Medications on File Prior to Visit   Medication Sig Dispense Refill   • diclofenac (VOLTAREN) 1 % gel gel Apply 4 g topically to the appropriate area as directed 4 (Four) Times a Day As Needed.     • fexofenadine (ALLEGRA) 180 MG tablet Take 180 mg by mouth Daily.       No current facility-administered medications on file prior to visit.        Allergies   Allergen Reactions   • Bactrim [Sulfamethoxazole-Trimethoprim] Hives   • Macrobid [Nitrofurantoin Macrocrystal] Itching and Swelling       Past Medical History:   Diagnosis Date   • Basal cell carcinoma     right cheek, left chest, left arm   • Colon polyp 12/01/2014   • Depression    • Elevated hemoglobin A1c 01/17/2017    5.7   • Frequent UTI    • Hyperlipidemia    • Hypertension 01/17/2017   • Interstitial cystitis    • Migraine headache 01/31/2014   • Osteopenia    • Plantar fasciitis 06/08/2009   • Scoliosis of thoracic spine 2018    xrays   • T8 vertebral fracture (CMS/HCC) 2010   • Vitamin D deficiency 11/25/2015        Past Surgical History:   Procedure Laterality Date   • SKIN CANCER EXCISION          Family History   Problem Relation Age of Onset   • Hyperlipidemia Mother    • Heart attack Mother    • Colon cancer Mother    • Mental illness Mother         congnitive impairment   • Arthritis Mother    • Heart disease Mother         hypertrophic cardiomyopathy   • Stomach cancer Father    • Hypertension Father    • Stroke Maternal Grandmother    • Stroke Maternal Grandfather    • Stroke Paternal Grandmother    • Stroke Paternal Grandfather    • Breast cancer Neg Hx    • Ovarian cancer Neg Hx         Social History     Socioeconomic History   • Marital status:      Spouse name: Not on file   • Number of children: Not on file   • Years of education: Not on file   • Highest education level: Not on file  "  Occupational History     Comment: non-profit consulting   Tobacco Use   • Smoking status: Never Smoker   • Smokeless tobacco: Never Used   Substance and Sexual Activity   • Alcohol use: Yes     Alcohol/week: 0.6 oz     Types: 1 Glasses of wine per week   • Drug use: No   • Sexual activity: Defer        Visit Vitals  /86 (BP Location: Left arm, Patient Position: Sitting, Cuff Size: Adult)   Pulse 74   Ht 177.8 cm (70\")   Wt 82.8 kg (182 lb 9.6 oz)   SpO2 92%   BMI 26.20 kg/m²        Physical Exam   Constitutional: No distress.   Cardiovascular: Normal rate and regular rhythm.   No murmur heard.  Pulmonary/Chest: Effort normal and breath sounds normal.   Musculoskeletal: She exhibits no edema.   Psychiatric: She has a normal mood and affect.   Vitals reviewed.           Keshav was seen today for hypertension and depression.    Diagnoses and all orders for this visit:    Essential hypertension  -     COZAAR 50 MG tablet; Take 1 tablet by mouth Daily.  Stable.  Patient had concerns about generic losartan recall.  Discussed options of using brand name Cozaar, switching to a different ARB, or switching to a different class of antihypertensive.  At this time she will try brand name Cozaar and sent prescription to the pharmacy.  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  Stable.  At this time decreased to Lexapro 10 mg.  Discussed if patient wants to discontinue completely she may stop or if taper needed to 5 mg dose for 1 to 2 weeks.      Return in about 6 months (around 4/21/2020) for Office visit.    Dr. Vonda Alex  "

## 2019-10-25 ENCOUNTER — HOSPITAL ENCOUNTER (OUTPATIENT)
Dept: MAMMOGRAPHY | Facility: HOSPITAL | Age: 64
Discharge: HOME | End: 2019-10-25
Admitting: OBSTETRICS & GYNECOLOGY

## 2019-10-25 DIAGNOSIS — Z12.31 VISIT FOR SCREENING MAMMOGRAM: ICD-10-CM

## 2019-10-25 PROCEDURE — 77067 SCR MAMMO BI INCL CAD: CPT | Performed by: RADIOLOGY

## 2019-10-25 PROCEDURE — 77063 BREAST TOMOSYNTHESIS BI: CPT | Performed by: RADIOLOGY

## 2019-10-25 PROCEDURE — 77067 SCR MAMMO BI INCL CAD: CPT

## 2019-10-25 PROCEDURE — 77063 BREAST TOMOSYNTHESIS BI: CPT

## 2019-12-04 ENCOUNTER — PATIENT MESSAGE (OUTPATIENT)
Dept: FAMILY MEDICINE CLINIC | Facility: CLINIC | Age: 64
End: 2019-12-04

## 2019-12-04 DIAGNOSIS — I10 ESSENTIAL HYPERTENSION: ICD-10-CM

## 2019-12-05 RX ORDER — LOSARTAN POTASSIUM 50 MG/1
50 TABLET ORAL DAILY
Qty: 90 TABLET | Refills: 1 | Status: SHIPPED | OUTPATIENT
Start: 2019-12-05 | End: 2019-12-31

## 2019-12-31 ENCOUNTER — APPOINTMENT (OUTPATIENT)
Dept: LAB | Facility: HOSPITAL | Age: 64
End: 2019-12-31

## 2019-12-31 ENCOUNTER — OFFICE VISIT (OUTPATIENT)
Dept: FAMILY MEDICINE CLINIC | Facility: CLINIC | Age: 64
End: 2019-12-31

## 2019-12-31 VITALS
HEIGHT: 70 IN | WEIGHT: 180 LBS | OXYGEN SATURATION: 98 % | BODY MASS INDEX: 25.77 KG/M2 | DIASTOLIC BLOOD PRESSURE: 82 MMHG | HEART RATE: 84 BPM | SYSTOLIC BLOOD PRESSURE: 116 MMHG | TEMPERATURE: 98.7 F

## 2019-12-31 DIAGNOSIS — R53.83 FATIGUE, UNSPECIFIED TYPE: ICD-10-CM

## 2019-12-31 DIAGNOSIS — J01.10 ACUTE NON-RECURRENT FRONTAL SINUSITIS: Primary | ICD-10-CM

## 2019-12-31 DIAGNOSIS — I10 ESSENTIAL HYPERTENSION: ICD-10-CM

## 2019-12-31 LAB
ALBUMIN SERPL-MCNC: 4.1 G/DL (ref 3.5–5.2)
ALBUMIN/GLOB SERPL: 1.4 G/DL
ALP SERPL-CCNC: 48 U/L (ref 39–117)
ALT SERPL W P-5'-P-CCNC: 16 U/L (ref 1–33)
ANION GAP SERPL CALCULATED.3IONS-SCNC: 11 MMOL/L (ref 5–15)
AST SERPL-CCNC: 19 U/L (ref 1–32)
BASOPHILS # BLD AUTO: 0.1 10*3/MM3 (ref 0–0.2)
BASOPHILS NFR BLD AUTO: 1.8 % (ref 0–1.5)
BILIRUB SERPL-MCNC: 0.3 MG/DL (ref 0.2–1.2)
BUN BLD-MCNC: 19 MG/DL (ref 8–23)
BUN/CREAT SERPL: 26.4 (ref 7–25)
CALCIUM SPEC-SCNC: 9.3 MG/DL (ref 8.6–10.5)
CHLORIDE SERPL-SCNC: 105 MMOL/L (ref 98–107)
CO2 SERPL-SCNC: 26 MMOL/L (ref 22–29)
CREAT BLD-MCNC: 0.72 MG/DL (ref 0.57–1)
DEPRECATED RDW RBC AUTO: 37.7 FL (ref 37–54)
EOSINOPHIL # BLD AUTO: 0.47 10*3/MM3 (ref 0–0.4)
EOSINOPHIL NFR BLD AUTO: 8.6 % (ref 0.3–6.2)
ERYTHROCYTE [DISTWIDTH] IN BLOOD BY AUTOMATED COUNT: 12.4 % (ref 12.3–15.4)
GFR SERPL CREATININE-BSD FRML MDRD: 82 ML/MIN/1.73
GLOBULIN UR ELPH-MCNC: 2.9 GM/DL
GLUCOSE BLD-MCNC: 87 MG/DL (ref 65–99)
HCT VFR BLD AUTO: 38.9 % (ref 34–46.6)
HGB BLD-MCNC: 13 G/DL (ref 12–15.9)
IMM GRANULOCYTES # BLD AUTO: 0 10*3/MM3 (ref 0–0.05)
IMM GRANULOCYTES NFR BLD AUTO: 0 % (ref 0–0.5)
LYMPHOCYTES # BLD AUTO: 1.73 10*3/MM3 (ref 0.7–3.1)
LYMPHOCYTES NFR BLD AUTO: 31.6 % (ref 19.6–45.3)
MCH RBC QN AUTO: 28.4 PG (ref 26.6–33)
MCHC RBC AUTO-ENTMCNC: 33.4 G/DL (ref 31.5–35.7)
MCV RBC AUTO: 84.9 FL (ref 79–97)
MONOCYTES # BLD AUTO: 0.52 10*3/MM3 (ref 0.1–0.9)
MONOCYTES NFR BLD AUTO: 9.5 % (ref 5–12)
NEUTROPHILS # BLD AUTO: 2.66 10*3/MM3 (ref 1.7–7)
NEUTROPHILS NFR BLD AUTO: 48.5 % (ref 42.7–76)
NRBC BLD AUTO-RTO: 0 /100 WBC (ref 0–0.2)
PLATELET # BLD AUTO: 321 10*3/MM3 (ref 140–450)
PMV BLD AUTO: 10.9 FL (ref 6–12)
POTASSIUM BLD-SCNC: 4.4 MMOL/L (ref 3.5–5.2)
PROT SERPL-MCNC: 7 G/DL (ref 6–8.5)
RBC # BLD AUTO: 4.58 10*6/MM3 (ref 3.77–5.28)
SODIUM BLD-SCNC: 142 MMOL/L (ref 136–145)
TSH SERPL DL<=0.05 MIU/L-ACNC: 2.28 UIU/ML (ref 0.27–4.2)
WBC NRBC COR # BLD: 5.48 10*3/MM3 (ref 3.4–10.8)

## 2019-12-31 PROCEDURE — 99214 OFFICE O/P EST MOD 30 MIN: CPT | Performed by: FAMILY MEDICINE

## 2019-12-31 PROCEDURE — 80053 COMPREHEN METABOLIC PANEL: CPT | Performed by: FAMILY MEDICINE

## 2019-12-31 PROCEDURE — 85025 COMPLETE CBC W/AUTO DIFF WBC: CPT | Performed by: FAMILY MEDICINE

## 2019-12-31 PROCEDURE — 84443 ASSAY THYROID STIM HORMONE: CPT | Performed by: FAMILY MEDICINE

## 2019-12-31 RX ORDER — AMOXICILLIN AND CLAVULANATE POTASSIUM 875; 125 MG/1; MG/1
1 TABLET, FILM COATED ORAL 2 TIMES DAILY
Qty: 20 TABLET | Refills: 0 | Status: SHIPPED | OUTPATIENT
Start: 2019-12-31 | End: 2020-02-04 | Stop reason: SDUPTHER

## 2019-12-31 NOTE — PROGRESS NOTES
Chief Complaint   Patient presents with   • Oral Swelling     congestion, fatigue, states that throat doesn't really hurt is more of a congested feeling.        Sore Throat    This is a new problem. The current episode started in the past 7 days. The problem has been unchanged. Neither side of throat is experiencing more pain than the other. There has been no fever. The pain is at a severity of 2/10. Associated symptoms include congestion, headaches, a hoarse voice and swollen glands. Pertinent negatives include no abdominal pain, coughing, diarrhea, drooling, ear discharge, ear pain, plugged ear sensation, neck pain, shortness of breath, stridor, trouble swallowing or vomiting. She has had no exposure to strep or mono.      12/15/19 went to Urgent care, took a week to get over illness. Cold was starting to go away but over a week ago started in her throat. She feels so tired even being off work and getting more sleep. In bed at night feels like something between nasal passage drainage and throat. This morning coughed up a little clear mucus with streak of mucus. Feels better later on in the day. Treated with ibuprofen for ache.     Wants to switch losartan, concern about recall.           Review of Systems   Constitutional: Positive for fatigue.   HENT: Positive for congestion, hoarse voice, sore throat and swollen glands. Negative for drooling, ear discharge, ear pain and trouble swallowing.    Respiratory: Negative for cough, shortness of breath and stridor.    Gastrointestinal: Negative for abdominal pain, diarrhea and vomiting.   Musculoskeletal: Negative for neck pain.        Current Outpatient Medications   Medication Sig Dispense Refill   • amoxicillin-clavulanate (AUGMENTIN) 875-125 MG per tablet Take 1 tablet by mouth 2 (Two) Times a Day for 10 days. 20 tablet 0   • Azilsartan Medoxomil (EDARBI) 40 MG tablet Take 40 mg by mouth Daily. 90 tablet 1   • diclofenac (VOLTAREN) 1 % gel gel Apply 4 g topically to  "the appropriate area as directed 4 (Four) Times a Day As Needed.     • escitalopram (LEXAPRO) 10 MG tablet Take 1 tablet by mouth Daily. 90 tablet 1   • fexofenadine (ALLEGRA) 180 MG tablet Take 180 mg by mouth Daily.       No current facility-administered medications for this visit.        Allergies   Allergen Reactions   • Bactrim [Sulfamethoxazole-Trimethoprim] Hives   • Macrobid [Nitrofurantoin Macrocrystal] Itching and Swelling       Patient Active Problem List   Diagnosis   • Foot mass, left   • Essential hypertension   • Non-seasonal allergic rhinitis   • Major depression in complete remission (CMS/HCC)       Social History     Social History Narrative   • Not on file       Visit Vitals  /82 (BP Location: Left arm, Patient Position: Sitting, Cuff Size: Adult)   Pulse 84   Temp 98.7 °F (37.1 °C) (Temporal)   Ht 177.8 cm (70\")   Wt 81.6 kg (180 lb)   SpO2 98%   BMI 25.83 kg/m²        Physical Exam   Constitutional: No distress.   HENT:   Right Ear: Ear canal normal. Tympanic membrane is not erythematous and not bulging. A middle ear effusion ( small, clear) is present.   Left Ear: Tympanic membrane and ear canal normal. Tympanic membrane is not erythematous and not bulging.  No middle ear effusion.   Nose: Mucosal edema ( erythema) present. Right sinus exhibits frontal sinus tenderness. Right sinus exhibits no maxillary sinus tenderness. Left sinus exhibits no maxillary sinus tenderness and no frontal sinus tenderness.   Mouth/Throat: Mucous membranes are normal. No oral lesions. Posterior oropharyngeal erythema present. No oropharyngeal exudate or posterior oropharyngeal edema. No tonsillar exudate.   Eyes: Right eye exhibits no discharge. Left eye exhibits no discharge.   Cardiovascular: Normal rate and regular rhythm.   No murmur heard.  Pulmonary/Chest: Effort normal and breath sounds normal.   Psychiatric: She has a normal mood and affect.   Vitals reviewed.           Keshav was seen today for oral " swelling.    Diagnoses and all orders for this visit:    Acute non-recurrent frontal sinusitis  -     CBC & Differential; Future  -     amoxicillin-clavulanate (AUGMENTIN) 875-125 MG per tablet; Take 1 tablet by mouth 2 (Two) Times a Day for 10 days.  -     CBC & Differential  -     CBC Auto Differential  New. Treat with Augmentin.   Fatigue, unspecified type  -     TSH Rfx On Abnormal To Free T4; Future  -     CBC & Differential; Future  -     Comprehensive Metabolic Panel; Future  -     TSH Rfx On Abnormal To Free T4  -     CBC & Differential  -     Comprehensive Metabolic Panel  -     CBC Auto Differential  New. Further evaluation with labs.   Essential hypertension  -     Azilsartan Medoxomil (EDARBI) 40 MG tablet; Take 40 mg by mouth Daily.  Patient concerned about recall with generic losartan.  Prescribed brand name Cozaar but unable to afford cost.  At this time see if Edarbi will go through insurance, if not will send to specialty pharmacy.      No follow-ups on file.    Dr. Vonda Alex

## 2020-01-02 ENCOUNTER — TELEPHONE (OUTPATIENT)
Dept: FAMILY MEDICINE CLINIC | Facility: CLINIC | Age: 65
End: 2020-01-02

## 2020-01-02 NOTE — TELEPHONE ENCOUNTER
"  Lab letter from 01/02/2019 \"The test results show normal thyroid function, no anemia.  Normal white blood cell count.  Eosinophil percentage is slightly high but not significant.  No explanation for the fatigue.\"    Called patient and advised patient verbalized understanding   "

## 2020-01-02 NOTE — TELEPHONE ENCOUNTER
Pt called and wanted to check on status of lab results completed on 12.31.19.. Please contact and advise.      Pt contact #: [phone]

## 2020-02-04 ENCOUNTER — OFFICE VISIT (OUTPATIENT)
Dept: FAMILY MEDICINE CLINIC | Facility: CLINIC | Age: 65
End: 2020-02-04

## 2020-02-04 VITALS
WEIGHT: 185.2 LBS | BODY MASS INDEX: 26.51 KG/M2 | HEIGHT: 70 IN | HEART RATE: 78 BPM | DIASTOLIC BLOOD PRESSURE: 84 MMHG | SYSTOLIC BLOOD PRESSURE: 122 MMHG | TEMPERATURE: 98.5 F | OXYGEN SATURATION: 97 %

## 2020-02-04 DIAGNOSIS — J01.01 ACUTE RECURRENT MAXILLARY SINUSITIS: Primary | ICD-10-CM

## 2020-02-04 DIAGNOSIS — I10 ESSENTIAL HYPERTENSION: ICD-10-CM

## 2020-02-04 DIAGNOSIS — H65.93 BILATERAL OTITIS MEDIA WITH EFFUSION: ICD-10-CM

## 2020-02-04 PROCEDURE — 99214 OFFICE O/P EST MOD 30 MIN: CPT | Performed by: FAMILY MEDICINE

## 2020-02-04 RX ORDER — LEVOCETIRIZINE DIHYDROCHLORIDE 5 MG/1
5 TABLET, FILM COATED ORAL EVERY EVENING
COMMUNITY
End: 2021-03-03

## 2020-02-04 RX ORDER — AMOXICILLIN AND CLAVULANATE POTASSIUM 875; 125 MG/1; MG/1
1 TABLET, FILM COATED ORAL 2 TIMES DAILY
Qty: 20 TABLET | Refills: 0 | Status: SHIPPED | OUTPATIENT
Start: 2020-02-04 | End: 2020-02-14

## 2020-02-04 NOTE — PROGRESS NOTES
Chief Complaint   Patient presents with   • Sinus Problem     drainage, really bad at night. collecting in the back of throat, waking up in the night because of this.        Sinus Problem   This is a new problem. The current episode started in the past 7 days. The problem has been gradually worsening since onset. There has been no fever. Associated symptoms include coughing and neck pain. Pertinent negatives include no shortness of breath or sore throat. Treatments tried: antihistamines.      Gunk in back of her throat wakes her up. Yellow mucus. Tenderness on left neck near ear. She has tried Xyzal. Last treated for sinusitis 12/31 with augmentin and finished the course.  Mild maxillary pain, worse with change in weather. Traveling to [place] by airplane.     She didn't switch from losartan to Edarbi yet.           Review of Systems   Constitutional: Negative for fever.   HENT: Positive for postnasal drip and rhinorrhea. Negative for sore throat and trouble swallowing.    Respiratory: Positive for cough. Negative for shortness of breath.    Musculoskeletal: Positive for neck pain.   Neurological: Positive for headache.   Psychiatric/Behavioral: Positive for sleep disturbance.        Current Outpatient Medications   Medication Sig Dispense Refill   • Azilsartan Medoxomil (EDARBI) 40 MG tablet Take 40 mg by mouth Daily. 90 tablet 1   • diclofenac (VOLTAREN) 1 % gel gel Apply 4 g topically to the appropriate area as directed 4 (Four) Times a Day As Needed.     • escitalopram (LEXAPRO) 10 MG tablet Take 1 tablet by mouth Daily. 90 tablet 1   • levocetirizine (XYZAL) 5 MG tablet Take 5 mg by mouth Every Evening.     • amoxicillin-clavulanate (AUGMENTIN) 875-125 MG per tablet Take 1 tablet by mouth 2 (Two) Times a Day for 10 days. 20 tablet 0     No current facility-administered medications for this visit.        Allergies   Allergen Reactions   • Bactrim [Sulfamethoxazole-Trimethoprim] Hives   • Macrobid  "[Nitrofurantoin Macrocrystal] Itching and Swelling       Patient Active Problem List   Diagnosis   • Foot mass, left   • Essential hypertension   • Non-seasonal allergic rhinitis   • Major depression in complete remission (CMS/HCC)       Social History     Social History Narrative   • Not on file       Visit Vitals  /84 (BP Location: Left arm, Patient Position: Sitting, Cuff Size: Adult)   Pulse 78   Temp 98.5 °F (36.9 °C) (Temporal)   Ht 177.8 cm (70\")   Wt 84 kg (185 lb 3.2 oz)   SpO2 97%   BMI 26.57 kg/m²        Physical Exam   Constitutional: No distress.   HENT:   Right Ear: Ear canal normal. Tympanic membrane is not erythematous and not bulging. A middle ear effusion ( small) is present.   Left Ear: Ear canal normal. Tympanic membrane is not erythematous and not bulging. A middle ear effusion ( small) is present.   Nose: Mucosal edema ( erythema) present.   Mouth/Throat: Oropharynx is clear and moist and mucous membranes are normal. No oral lesions.   Eyes: Right eye exhibits no discharge. Left eye exhibits no discharge.   Cardiovascular: Normal rate and regular rhythm.   No murmur heard.  Pulmonary/Chest: Effort normal and breath sounds normal.   Lymphadenopathy:        Head (right side): Submandibular adenopathy present. No preauricular and no posterior auricular adenopathy present.        Head (left side): Submandibular adenopathy present. No preauricular and no posterior auricular adenopathy present.     She has no cervical adenopathy.   Psychiatric: She has a normal mood and affect.   Vitals reviewed.           Keshav was seen today for sinus problem.    Diagnoses and all orders for this visit:    Acute recurrent maxillary sinusitis  -     amoxicillin-clavulanate (AUGMENTIN) 875-125 MG per tablet; Take 1 tablet by mouth 2 (Two) Times a Day for 10 days.  Discussed treating with Augmentin again or switching to a different medication.  Patient would prefer to take Augmentin again since it worked last " time for her.  If she continues to have sinusitis problems consider referral to ear nose and throat.  Bilateral otitis media with effusion  Discussed with patient with her upcoming air travel she may experience ear pain especially with the effusion.  Essential hypertension  Patient had requested switch from generic losartan to new medication.  She was previously prescribed Edarbi but has not filled the prescription yet.      Return if symptoms worsen or fail to improve.    Dr. Vonda Alex

## 2020-04-21 ENCOUNTER — TELEMEDICINE (OUTPATIENT)
Dept: FAMILY MEDICINE CLINIC | Facility: CLINIC | Age: 65
End: 2020-04-21

## 2020-04-21 DIAGNOSIS — F32.5 MAJOR DEPRESSION IN COMPLETE REMISSION (HCC): ICD-10-CM

## 2020-04-21 DIAGNOSIS — Z13.1 SCREENING FOR DIABETES MELLITUS: ICD-10-CM

## 2020-04-21 DIAGNOSIS — Z13.0 SCREENING FOR DEFICIENCY ANEMIA: ICD-10-CM

## 2020-04-21 DIAGNOSIS — Z13.29 SCREENING FOR THYROID DISORDER: ICD-10-CM

## 2020-04-21 DIAGNOSIS — R20.2 PARESTHESIA: ICD-10-CM

## 2020-04-21 DIAGNOSIS — I10 ESSENTIAL HYPERTENSION: Primary | ICD-10-CM

## 2020-04-21 PROCEDURE — 99214 OFFICE O/P EST MOD 30 MIN: CPT | Performed by: FAMILY MEDICINE

## 2020-04-21 RX ORDER — LOSARTAN POTASSIUM 50 MG/1
50 TABLET ORAL DAILY
Qty: 90 TABLET | Refills: 1 | Status: SHIPPED | OUTPATIENT
Start: 2020-04-21 | End: 2020-10-23 | Stop reason: SDUPTHER

## 2020-04-21 RX ORDER — ESCITALOPRAM OXALATE 10 MG/1
10 TABLET ORAL DAILY
Qty: 90 TABLET | Refills: 3 | Status: SHIPPED | OUTPATIENT
Start: 2020-04-21 | End: 2021-05-13

## 2020-04-21 RX ORDER — LOSARTAN POTASSIUM 50 MG/1
50 TABLET ORAL DAILY
COMMUNITY
Start: 2020-03-08 | End: 2020-04-21

## 2020-04-21 NOTE — PROGRESS NOTES
Telehealth video visit.     Chief Complaint   Patient presents with   • Hypertension   • Depression        Hypertension   This is a chronic problem. The problem is controlled. Pertinent negatives include no blurred vision, chest pain or headaches. Current antihypertension treatment includes angiotensin blockers. Compliance problems include medication cost.    Depression   Visit Type: follow-up  Patient is not experiencing: depressed mood.           She's doing pretty well. Working from home. Going for walks and 2-3 days a week exercising with weights. Last week had a deadline and she was a little stressed, tried to cut back on caffeine. Left wrist on inner forearm buzzy sensation 3-5 times, concerned it was health tracker but she wasn't wearing it. No further occurrences since reducing caffeine and stress. No radiation of sensation.     Has home blood pressure machine, but no regular monitoring. Doesn't feel like its been high.     Mood has been good with lexapro 10mg. When she first cut back from 20mg she noticed a change.         Review of Systems   Eyes: Negative for blurred vision.   Cardiovascular: Negative for chest pain.   Neurological: Negative for headache.        Paresthesia   Psychiatric/Behavioral: Positive for stress. Negative for depressed mood.        Current Outpatient Medications on File Prior to Visit   Medication Sig Dispense Refill   • diclofenac (VOLTAREN) 1 % gel gel Apply 4 g topically to the appropriate area as directed 4 (Four) Times a Day As Needed.     • levocetirizine (XYZAL) 5 MG tablet Take 5 mg by mouth Every Evening.     • VITAMIN D PO Take 2 each by mouth.     • escitalopram (LEXAPRO) 10 MG tablet Take 1 tablet by mouth Daily. 90 tablet 1   • losartan (COZAAR) 50 MG tablet Take 50 mg by mouth Daily.       No current facility-administered medications on file prior to visit.        Allergies   Allergen Reactions   • Bactrim [Sulfamethoxazole-Trimethoprim] Hives   • Macrobid  [Nitrofurantoin Macrocrystal] Itching and Swelling       Past Medical History:   Diagnosis Date   • Basal cell carcinoma     right cheek, left chest, left arm   • Colon polyp 12/01/2014   • Depression    • Elevated hemoglobin A1c 01/17/2017    5.7   • Frequent UTI    • Hyperlipidemia    • Hypertension 01/17/2017   • Interstitial cystitis    • Migraine headache 01/31/2014   • Mononucleosis    • Osteopenia    • Plantar fasciitis 06/08/2009   • Scoliosis of thoracic spine 2018    xrays   • T8 vertebral fracture (CMS/HCC) 2010   • Vitamin D deficiency 11/25/2015        Past Surgical History:   Procedure Laterality Date   • SKIN CANCER EXCISION          Family History   Problem Relation Age of Onset   • Hyperlipidemia Mother    • Heart attack Mother    • Colon cancer Mother    • Mental illness Mother         congnitive impairment   • Arthritis Mother    • Heart disease Mother         hypertrophic cardiomyopathy   • Stomach cancer Father    • Hypertension Father    • Stroke Maternal Grandmother    • Stroke Maternal Grandfather    • Stroke Paternal Grandmother    • Stroke Paternal Grandfather    • Breast cancer Neg Hx    • Ovarian cancer Neg Hx         Social History     Socioeconomic History   • Marital status:      Spouse name: Not on file   • Number of children: Not on file   • Years of education: Not on file   • Highest education level: Not on file   Occupational History     Comment: non-profit consulting   Tobacco Use   • Smoking status: Never Smoker   • Smokeless tobacco: Never Used   Substance and Sexual Activity   • Alcohol use: Yes     Alcohol/week: 1.0 standard drinks     Types: 1 Glasses of wine per week   • Drug use: No   • Sexual activity: Defer        Physical Exam   Constitutional: She is oriented to person, place, and time. She is cooperative. She does not appear ill. No distress.   HENT:   Normal hearing   Eyes: Conjunctivae are normal. Right eye exhibits no discharge. Left eye exhibits no  discharge.   Pulmonary/Chest: Effort normal. No respiratory distress.   Neurological: She is alert and oriented to person, place, and time.   Psychiatric: She has a normal mood and affect. Her behavior is normal. Judgment and thought content normal.       Keshav was seen today for hypertension and depression.    Diagnoses and all orders for this visit:    Essential hypertension  -     losartan (COZAAR) 50 MG tablet; Take 1 tablet by mouth Daily.  -     Comprehensive Metabolic Panel; Future  -     Lipid Panel; Future  -     TSH Rfx On Abnormal To Free T4; Future  She was unable to afford Edarbi with insurance and restarted losartan.  She is doing well with current dose.  Encouraged her to check home blood pressure readings with her monitor.  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  Stable.  During this high stress time recommend continuing current dose of Lexapro.  At a later date may continue to taper Lexapro to discontinue if desired.  Screening for thyroid disorder  -     TSH Rfx On Abnormal To Free T4; Future    Screening for deficiency anemia  -     CBC & Differential; Future    Screening for diabetes mellitus  -     Comprehensive Metabolic Panel; Future    Paresthesia  New.  Possibly stress related.  Does not follow a dermatomal pattern.    Plan to check fasting labs 2 days prior to her physical.  Return in about 6 months (around 10/21/2020) for Physical.    Start of visit 8:18 am. End of visit 8:35 am.    Dr. Vonda Alex

## 2020-09-17 ENCOUNTER — CLINICAL SUPPORT (OUTPATIENT)
Dept: FAMILY MEDICINE CLINIC | Facility: CLINIC | Age: 65
End: 2020-09-17

## 2020-09-17 DIAGNOSIS — Z23 FLU VACCINE NEED: Primary | ICD-10-CM

## 2020-09-17 DIAGNOSIS — Z23 NEED FOR TDAP VACCINATION: ICD-10-CM

## 2020-09-17 PROCEDURE — 90686 IIV4 VACC NO PRSV 0.5 ML IM: CPT | Performed by: FAMILY MEDICINE

## 2020-09-17 PROCEDURE — 90471 IMMUNIZATION ADMIN: CPT | Performed by: FAMILY MEDICINE

## 2020-09-17 PROCEDURE — 90472 IMMUNIZATION ADMIN EACH ADD: CPT | Performed by: FAMILY MEDICINE

## 2020-09-17 PROCEDURE — 90715 TDAP VACCINE 7 YRS/> IM: CPT | Performed by: FAMILY MEDICINE

## 2020-10-07 ENCOUNTER — OFFICE VISIT (OUTPATIENT)
Dept: FAMILY MEDICINE CLINIC | Facility: CLINIC | Age: 65
End: 2020-10-07

## 2020-10-07 ENCOUNTER — HOSPITAL ENCOUNTER (OUTPATIENT)
Dept: GENERAL RADIOLOGY | Facility: HOSPITAL | Age: 65
Discharge: HOME | End: 2020-10-07
Admitting: FAMILY MEDICINE

## 2020-10-07 ENCOUNTER — TELEPHONE (OUTPATIENT)
Dept: FAMILY MEDICINE CLINIC | Facility: CLINIC | Age: 65
End: 2020-10-07

## 2020-10-07 VITALS
SYSTOLIC BLOOD PRESSURE: 124 MMHG | DIASTOLIC BLOOD PRESSURE: 86 MMHG | BODY MASS INDEX: 25.14 KG/M2 | OXYGEN SATURATION: 97 % | WEIGHT: 175.6 LBS | HEIGHT: 70 IN | HEART RATE: 78 BPM
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
## 2020-10-07 DIAGNOSIS — M25.532 LEFT WRIST PAIN: ICD-10-CM

## 2020-10-07 DIAGNOSIS — S52.502A CLOSED FRACTURE OF DISTAL END OF LEFT RADIUS, UNSPECIFIED FRACTURE MORPHOLOGY, INITIAL ENCOUNTER: Primary | ICD-10-CM

## 2020-10-07 DIAGNOSIS — M25.532 LEFT WRIST PAIN: Primary | ICD-10-CM

## 2020-10-07 DIAGNOSIS — J30.89 NON-SEASONAL ALLERGIC RHINITIS, UNSPECIFIED TRIGGER: ICD-10-CM

## 2020-10-07 DIAGNOSIS — H69.80 DYSFUNCTION OF EUSTACHIAN TUBE, UNSPECIFIED LATERALITY: ICD-10-CM

## 2020-10-07 PROBLEM — N30.10 CHRONIC INTERSTITIAL CYSTITIS: Status: ACTIVE | Noted: 2018-04-09

## 2020-10-07 PROCEDURE — 73110 X-RAY EXAM OF WRIST: CPT

## 2020-10-07 PROCEDURE — 99214 OFFICE O/P EST MOD 30 MIN: CPT | Performed by: FAMILY MEDICINE

## 2020-10-07 RX ORDER — FLUTICASONE PROPIONATE 50 MCG
2 SPRAY, SUSPENSION (ML) NASAL DAILY
Qty: 1 BOTTLE | Refills: 11 | Status: SHIPPED | OUTPATIENT
Start: 2020-10-07

## 2020-10-07 NOTE — PROGRESS NOTES
Chief Complaint   Patient presents with   • Wrist Injury     left wrist injury, happened on the 18th of last month. States that it is still swollen, but not as bad as it was before   • Ear Fullness     would like ears checked        Wrist Injury   The incident occurred more than 1 week ago. The injury mechanism was a fall. The pain is present in the left wrist. The pain does not radiate. She has tried NSAIDs and ice for the symptoms. The treatment provided mild relief.   Ear Fullness   This is a recurrent problem. There has been no fever. Associated symptoms include coughing. Pertinent negatives include no ear discharge or rhinorrhea. The treatment provided no relief.      Answers for HPI/ROS submitted by the patient on 10/6/2020   What is the primary reason for your visit?: Other  Please describe your symptoms.: Hurt wrist on Sept. 18, swelling has reduced but is still painful.  Have you had these symptoms before?: No  How long have you been having these symptoms?: Greater than 2 weeks  Please list any medications you are currently taking for this condition.: Ibuprofen  Please describe any probable cause for these symptoms. : A fall with most of my weight landing on the left wrist    She feel at park in the grass while leaning over to  dog poop. She had immediate swelling. She used ice. She still has swelling and tenderness. Ibuprofen 2 times a day. Pain worse at night. Sometimes noon time takes ibuprofen with pain from keyboarding. Hard to find position to put hand in without hurting while sleeping.     Year round allergies. Ears feel full and voice changed. Left ear not crackling. No ear drainage. A little streak of blood when she coughed up first thing this morning. She takes temperature every morning for work. She uses OTC antihistamines, using loratadine.           Review of Systems   Constitutional: Negative for chills and fever.   HENT: Positive for ear pain, postnasal drip and voice change. Negative  for ear discharge, rhinorrhea and sneezing.    Respiratory: Positive for cough.    Musculoskeletal: Positive for arthralgias and joint swelling. Negative for myalgias.        Patient Active Problem List   Diagnosis   • Foot mass, left   • Essential hypertension   • Non-seasonal allergic rhinitis   • Major depression in complete remission (CMS/HCC)   • Chronic interstitial cystitis       Current Outpatient Medications   Medication Sig Dispense Refill   • diclofenac (VOLTAREN) 1 % gel gel Apply 4 g topically to the appropriate area as directed 4 (Four) Times a Day As Needed.     • escitalopram (LEXAPRO) 10 MG tablet Take 1 tablet by mouth Daily. 90 tablet 3   • levocetirizine (XYZAL) 5 MG tablet Take 5 mg by mouth Every Evening.     • losartan (COZAAR) 50 MG tablet Take 1 tablet by mouth Daily. 90 tablet 1   • VITAMIN D PO Take 2 each by mouth.       No current facility-administered medications for this visit.        Allergies   Allergen Reactions   • Bactrim [Sulfamethoxazole-Trimethoprim] Hives   • Macrobid [Nitrofurantoin Macrocrystal] Itching and Swelling       Past Medical History:   Diagnosis Date   • Basal cell carcinoma     right cheek, left chest, left arm   • Colon polyp 12/01/2014   • Depression    • Elevated hemoglobin A1c 01/17/2017    5.7   • Frequent UTI    • Hyperlipidemia    • Hypertension 01/17/2017   • Interstitial cystitis    • Migraine headache 01/31/2014   • Mononucleosis    • Osteopenia    • Plantar fasciitis 06/08/2009   • Scoliosis of thoracic spine 2018    xrays   • T8 vertebral fracture (CMS/[facility]) 2010   • Vitamin D deficiency 11/25/2015        Past Surgical History:   Procedure Laterality Date   • SKIN CANCER EXCISION          Family History   Problem Relation Age of Onset   • Hyperlipidemia Mother    • Heart attack Mother    • Colon cancer Mother    • Mental illness Mother         congnitive impairment   • Arthritis Mother    • Heart disease Mother         hypertrophic cardiomyopathy   •  "Stomach cancer Father    • Hypertension Father    • Stroke Maternal Grandmother    • Stroke Maternal Grandfather    • Stroke Paternal Grandmother    • Stroke Paternal Grandfather    • Breast cancer Neg Hx    • Ovarian cancer Neg Hx         Social History     Socioeconomic History   • Marital status:      Spouse name: Not on file   • Number of children: Not on file   • Years of education: Not on file   • Highest education level: Not on file   Occupational History     Comment: non-profit consulting   Tobacco Use   • Smoking status: Never Smoker   • Smokeless tobacco: Never Used   Substance and Sexual Activity   • Alcohol use: Yes     Alcohol/week: 1.0 standard drinks     Types: 1 Glasses of wine per week   • Drug use: No   • Sexual activity: Defer   Social History Narrative    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    Physical Exam  Vitals signs reviewed.   Constitutional:       General: She is not in acute distress.     Appearance: She is not ill-appearing.   HENT:      Right Ear: Tympanic membrane, ear canal and external ear normal.      Left Ear: Tympanic membrane, ear canal and external ear normal.      Nose: Nose normal.      Mouth/Throat:      Mouth: Mucous membranes are moist.      Tongue: No lesions.      Pharynx: Oropharynx is clear. Uvula midline. No posterior oropharyngeal erythema.   Cardiovascular:      Rate and Rhythm: Normal rate and regular rhythm.      Heart sounds: No murmur.   Pulmonary:      Effort: Pulmonary effort is normal.      Breath sounds: Normal breath sounds.      Comments: Occasional dry cough  Musculoskeletal:      Left wrist: She exhibits bony tenderness ( Distal radius) and swelling. She exhibits normal range of motion ( Full active range of motion however pain " present) and no deformity.   Neurological:      Mental Status: She is alert.   Psychiatric:         Mood and Affect: Mood normal.         Behavior: Behavior normal.         Thought Content: Thought content normal.         Judgment: Judgment normal.              Keshav was seen today for wrist injury and ear fullness.    Diagnoses and all orders for this visit:    Left wrist pain  -     XR Wrist 3+ View Left; Future  New.  Further evaluation with x-rays due to FOOSH.  Continue NSAIDs and ice as needed.  If fracture present plan to refer to orthopedics.  Also discussed using brace at night while sleeping for comfort.  Fall, initial encounter  -     XR Wrist 3+ View Left; Future    Dysfunction of Eustachian tube, unspecified laterality  Recommend ear popping exercises 10-20 times per day.  Add nasal steroid for allergy control in addition to antihistamine.  Non-seasonal allergic rhinitis, unspecified trigger  -     fluticasone (Flonase) 50 MCG/ACT nasal spray; 2 sprays into the nostril(s) as directed by provider Daily.  Uncontrolled.Add nasal steroid for allergy control in addition to antihistamine.      Return in 19 days (on 10/26/2020) for Physical.    Dr. Vonda Alex

## 2020-10-07 NOTE — TELEPHONE ENCOUNTER
Called patient, no answer. Left detailed voicemail letting patient know results and referral ordered.    Patient to call back with any questions or concerns

## 2020-10-07 NOTE — TELEPHONE ENCOUNTER
The test results show that you do have a fracture.  I am going to place a referral to orthopedics.

## 2020-10-08 ENCOUNTER — OFFICE VISIT (OUTPATIENT)
Dept: ORTHOPEDIC SURGERY | Facility: CLINIC | Age: 65
End: 2020-10-08

## 2020-10-08 VITALS — HEIGHT: 70 IN | WEIGHT: 175.71 LBS | OXYGEN SATURATION: 98 % | BODY MASS INDEX: 25.15 KG/M2 | HEART RATE: 85 BPM

## 2020-10-08 DIAGNOSIS — S52.572A OTHER CLOSED INTRA-ARTICULAR FRACTURE OF DISTAL END OF LEFT RADIUS, INITIAL ENCOUNTER: ICD-10-CM

## 2020-10-08 DIAGNOSIS — M25.532 LEFT WRIST PAIN: Primary | ICD-10-CM
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
## 2020-10-08 PROCEDURE — 25600 CLTX DST RDL FX/EPHYS SEP WO: CPT | Performed by: PHYSICIAN ASSISTANT

## 2020-10-08 PROCEDURE — 99214 OFFICE O/P EST MOD 30 MIN: CPT | Performed by: PHYSICIAN ASSISTANT

## 2020-10-08 NOTE — PROGRESS NOTES
[facility] [place] [facility] Note    Subjective     Chief Complaint   Patient presents with   • Left Wrist - Pain     DOI 09/18/2020        HPI  Keshav Gu is a 65 y.o. female.  Right-hand-dominant.  Patient presents for evaluation of her left wrist.  She reports that on the above-stated date she fell injuring her left wrist.  She thought was just bruised but because of persistent pain and swelling she was seen by her PCP yesterday who ordered x-rays and noted that she had a distal radius fracture.  Patient was referred to orthopedics for further evaluation and treatment.    Current pain scale 5-6/10.  Severity the pain moderate.  Quality pain aching, stabbing.  Associated symptoms swelling.  Patient has been trying rest ice and anti-inflammatories.  No reported numbness or tingling into the extremity.    No reported fever, chills, night sweats or other constitutional symptoms.    Past Medical History:   Diagnosis Date   • Basal cell carcinoma     right cheek, left chest, left arm   • Colon polyp 12/01/2014   • Depression    • Elevated hemoglobin A1c 01/17/2017    5.7   • Frequent UTI    • Hyperlipidemia    • Hypertension 01/17/2017   • Interstitial cystitis    • Migraine headache 01/31/2014   • Mononucleosis    • Osteopenia    • Plantar fasciitis 06/08/2009   • Scoliosis of thoracic spine 2018    xrays   • T8 vertebral fracture (CMS/HCC) 2010   • Vitamin D deficiency 11/25/2015      Past Surgical History:   Procedure Laterality Date   • SKIN CANCER EXCISION        Family History   Problem Relation Age of Onset   • Hyperlipidemia Mother    • Heart attack Mother    • Colon cancer Mother    • Mental illness Mother         congnitive impairment   • Arthritis Mother    • Heart disease Mother         hypertrophic cardiomyopathy   • Stomach cancer Father    • Hypertension Father    • Stroke Maternal Grandmother    • Stroke Maternal Grandfather    • Stroke Paternal Grandmother    • Stroke Paternal  Grandfather    • Breast cancer Neg Hx    • Ovarian cancer Neg Hx      Social History     Socioeconomic History   • Marital status:      Spouse name: Not on file   • Number of children: Not on file   • Years of education: Not on file   • Highest education level: Not on file   Occupational History     Comment: non-profit consulting   Tobacco Use   • Smoking status: Never Smoker   • Smokeless tobacco: Never Used   Substance and Sexual Activity   • Alcohol use: Yes     Alcohol/week: 1.0 standard drinks     Types: 1 Glasses of wine per week   • Drug use: No   • Sexual activity: Defer   Social History Narrative    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     • escitalopram (LEXAPRO) 10 MG tablet Take 1 tablet by mouth Daily. 90 tablet 3   • fluticasone (Flonase) 50 MCG/ACT nasal spray 2 sprays into the nostril(s) as directed by provider Daily. 1 bottle 11   • levocetirizine (XYZAL) 5 MG tablet Take 5 mg by mouth Every Evening.     • losartan (COZAAR) 50 MG tablet Take 1 tablet by mouth Daily. 90 tablet 1   • VITAMIN D PO Take 2 each by mouth.       No current facility-administered medications on file prior to visit.       Allergies   Allergen Reactions   • Bactrim [Sulfamethoxazole-Trimethoprim] Hives   • Macrobid [Nitrofurantoin Macrocrystal] Itching and Swelling        The following portions of the patient's history were reviewed and updated as appropriate: allergies, current medications, past family history, past medical history, past social history, past surgical history and problem list.    Review of Systems   Constitutional: Negative.    HENT: Positive for congestion.    Eyes: Negative.    Respiratory: Negative.    Cardiovascular: Negative.    Gastrointestinal: Negative.    Endocrine: Negative.    Genitourinary:  "Negative.    Musculoskeletal: Positive for arthralgias and joint swelling.   Skin: Negative.    Allergic/Immunologic: Positive for environmental allergies.   Neurological: Negative.    Hematological: Negative.    Psychiatric/Behavioral: Negative.         Objective      Physical Exam  Pulse 85   Ht 177.8 cm (70\")   Wt 79.7 kg (175 lb 11.3 oz)   SpO2 98%   BMI 25.21 kg/m²     Body mass index is 25.21 kg/m².    GENERAL APPEARANCE: awake, alert & oriented x 3, in no acute distress and well developed, well nourished  PSYCH: normal mood and affect  LUNGS:  breathing nonlabored, no wheezing  EYES: sclera anicteric, pupils equal  CARDIOVASCULAR: palpable pulses. Capillary refill less than 2 seconds  INTEGUMENTARY: skin intact, no clubbing, cyanosis  NEUROLOGIC:  Normal Sensation         Ortho Exam  Left wrist/forearm  Skin: Grossly intact with any redness, warmth.  Positive soft tissue swelling, mild.  Negative deformity to forearm/wrist.  Tenderness: Positive tenderness noted distal radius.  No specific tenderness to anatomic snuffbox, TFCC or DRUJ.  Motion: Limited range of motion of the wrist secondary to pain.  Patient is able to make a composite fist.  Motor: Grossly intact R/U/M/AIN/PIN  Sensory: Grossly intact LT: R/U/M  Vascular: Brisk capillary refill, 2+ radial pulse    Shoulder and elbow  Skin intact  No tenderness  Full range of motion      Imaging/Studies  Dr. Conteh and I reviewed plain film imaging performed on 10/7/2020.  Patient does have loss of volar tilt but it does remain in acceptable position to continue with nonoperative management as there is already healing because the fracture is 3 weeks old.    EXAMINATION: XR WRIST 3+ VW LEFT-      INDICATION: fall, pain, swelling; M25.532-Pain in left wrist;  W19.XXXA-Unspecified fall, initial encounter      COMPARISON: NONE     FINDINGS: Transverse mildly impacted fracture of the distal radius.  Carpal rows are intact and alignment is otherwise " maintained.     IMPRESSION:  Transverse mildly impacted fracture of the distal radius.     This report was finalized on 10/7/2020 11:19 AM by Trevor Sherwood.    Assessment/Plan        ICD-10-CM ICD-9-CM   1. Left wrist pain  M25.532 719.43   2. Other closed intra-articular fracture of distal end of left radius, initial encounter  S52.572A 813.42   3. Fall from slip, trip, or stumble, initial encounter  W01.0XXA E885.9       No orders of the defined types were placed in this encounter.       -Left wrist pain due to a intra-articular left distal radius fracture.  Because fracture is intra-articular patient is at increased risk for developing arthritis to the wrist joint.  -Patient was placed into a short arm fiberglass cast today.  -She may continue use of ibuprofen for pain control.  -Elevation gentle range of motion of the digits to help assist with swelling control.  -To remain nonweightbearing to the right wrist.  -Follow-up in 3 weeks for repeat evaluation which include pre-clinic imaging out of the cast.  Anticipate transition to a cock-up wrist plan at that time.  -Questions and concerns answered.    History, exam and imaging all discussed , who agrees with the above assessment and plan.    Medical Decision Making  Management Options : over-the-counter medicine and close treatment of fracture or dislocation  Data/Risk: radiology tests       Lluvia Sterling PA-C  10/09/20  08:34 EDT         EMR Dragon/Transcription disclaimer:  Much of this encounter note is an electronic transcription of spoken language to printed text. Electronic transcription of spoken language may permit erroneous, or at times, nonsensical words or phrases to be inadvertently transcribed. Although I have reviewed the note for such errors, some may still exist.

## 2020-10-23 ENCOUNTER — TELEPHONE (OUTPATIENT)
Dept: FAMILY MEDICINE CLINIC | Facility: CLINIC | Age: 65
End: 2020-10-23

## 2020-10-23 DIAGNOSIS — I10 ESSENTIAL HYPERTENSION: ICD-10-CM

## 2020-10-23 RX ORDER — LOSARTAN POTASSIUM 50 MG/1
50 TABLET ORAL DAILY
Qty: 90 TABLET | Refills: 1 | Status: SHIPPED | OUTPATIENT
Start: 2020-10-23 | End: 2021-05-20

## 2020-10-23 NOTE — TELEPHONE ENCOUNTER
Caller: Keshav Gu    Relationship: Self    Best call back number:[phone]  Medication needed:   Requested Prescriptions     Pending Prescriptions Disp Refills   • losartan (COZAAR) 50 MG tablet 90 tablet 1     Sig: Take 1 tablet by mouth Daily.       When do you need the refill by: 11/15/20    What details did the patient provide when requesting the medication: PATIENT HAS AN APPOINTMENT ON 12/02 BUT WILL BE OUT OF THIS RX BEFORE THEN    Does the patient have less than a 3 day supply:  [] Yes  [x] No    What is the patient's preferred pharmacy:    [id number] - [place], [place] - [address] [facility] [address] [facility] [facility] [phone] [facility] [phone] FX

## 2020-10-29 ENCOUNTER — OFFICE VISIT (OUTPATIENT)
Dept: ORTHOPEDIC SURGERY | Facility: CLINIC | Age: 65
End: 2020-10-29

## 2020-10-29 VITALS — HEIGHT: 70 IN | WEIGHT: 175.71 LBS | BODY MASS INDEX: 25.15 KG/M2 | HEART RATE: 80 BPM | OXYGEN SATURATION: 98 %

## 2020-10-29 DIAGNOSIS — S52.572D OTHER CLOSED INTRA-ARTICULAR FRACTURE OF DISTAL END OF LEFT RADIUS WITH ROUTINE HEALING, SUBSEQUENT ENCOUNTER: ICD-10-CM

## 2020-10-29 DIAGNOSIS — Z09 FRACTURE FOLLOW-UP: Primary | ICD-10-CM

## 2020-10-29 PROCEDURE — 99024 POSTOP FOLLOW-UP VISIT: CPT | Performed by: PHYSICIAN ASSISTANT

## 2020-11-04 ENCOUNTER — TREATMENT (OUTPATIENT)
Dept: PHYSICAL THERAPY | Facility: CLINIC | Age: 65
End: 2020-11-04

## 2020-11-04 DIAGNOSIS — M25.532 LEFT WRIST PAIN: Primary | ICD-10-CM

## 2020-11-04 PROCEDURE — 97110 THERAPEUTIC EXERCISES: CPT | Performed by: PHYSICAL THERAPIST

## 2020-11-04 PROCEDURE — 97161 PT EVAL LOW COMPLEX 20 MIN: CPT | Performed by: PHYSICAL THERAPIST

## 2020-11-04 NOTE — PROGRESS NOTES
Physical Therapy Initial Evaluation and Plan of Care      Patient: Keshav Gu   : 1955  Diagnosis/ICD-10 Code:  The encounter diagnosis was Left wrist pain.   Referring practitioner: Lluvia Sterling, *  Date of Initial Visit: Type: THERAPY  Noted: 2020  Today's Date: 2020  Patient seen for 1 sessions         Keshav Gu reports:  L wrist discomfort and stiffness following distal radius fracture  Subjective Questionnaire: QuickDASH: 25.00    Subjective Evaluation    History of Present Illness  Date of onset: 2020  Mechanism of injury: Pt fell while walking her dog at the park and caught herself on her left hand.  She sustained L distal radius fracture, but did not realize it until 3 weeks after the injury.  She was casted for 3 weeks, then placed in soft splint last week.  She presents today to begin PT.  MD order specifies no weight-bearing and no strengthening for another 4 weeks.    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    Hand dominance: right    Diagnostic Tests  Abnormal x-ray: healing distal radius fracture.    Treatments  Previous treatment: immobilization  Patient Goals  Patient goals for therapy: decreased pain and increased motion           Treatment  Exercise 1  Exercise Name 1: Initial HEP education provided today.         Functional Testing  Functional Tests Options: Quick DASH   Objective          Observations     Additional Wrist/Hand Observation Details  Pt is wearing soft wrist splint.  She has mild swelling on dorsum of wrist.    Palpation     Additional Palpation Details  Localized L dorsal wrist tenderness.    Neurological Testing     Sensation     Wrist/Hand   Left   Intact: light touch    Right   Intact: light  touch    Active Range of Motion     Left Wrist   Wrist flexion: 32 degrees   Wrist extension: 45 degrees   Radial deviation: 25 degrees   Ulnar deviation: 16 degrees     Right Wrist   Wrist flexion: 70 degrees   Wrist extension: 67 degrees   Radial deviation: 25 degrees   Ulnar deviation: 35 degrees     Additional Active Range of Motion Details  Pron/supination WNL's    Passive Range of Motion     Additional Passive Range of Motion Details  Able to fully close fist.    Strength/Myotome Testing     Left Wrist/Hand      (2nd hand position)     Trial 1: 28 lbs    Trial 2: 24 lbs    Trial 3: 19 lbs    Average: 23.67 lbs    Right Wrist/Hand      (2nd hand position)     Trial 1: 44 lbs    Trial 2: 44 lbs    Trial 3: 42 lbs    Average: 43.33 lbs          Assessment & Plan     Assessment  Impairments: abnormal or restricted ROM, activity intolerance, impaired physical strength, lacks appropriate home exercise program and pain with function  Assessment details: Pt presents with decreased L wrist ROM as expected following immobilization for fracture healing.  She is currently restricted from weight-bearing and strengthening activities.  She will benefit from PT intervention to address ROM deficits in preparation for strengthening.  Prognosis: good  Functional Limitations: carrying objects, lifting, pulling, pushing, uncomfortable because of pain and unable to perform repetitive tasks  Goals  Plan Goals: 4 weeks  Pt. demonstrates independence and compliance with initial HEP.  Pt. reports reduction in pain intensity to no worse than 3/10 on NPRS.  AROM of L wrist shows improvement over baseline measures.  L  is improved by 5#.      Plan  Therapy options: will be seen for skilled physical therapy services  Planned modality interventions: cryotherapy and thermotherapy (hydrocollator packs)  Planned therapy interventions: manual therapy, neuromuscular re-education, soft tissue mobilization, strengthening, stretching,  therapeutic activities, joint mobilization, home exercise program, functional ROM exercises and flexibility  Frequency: 1x week  Duration in visits: 8  Treatment plan discussed with: patient  Plan details: PT per POC weekly, addressing ROM deficits initially, then progressing to strengthening in 4 weeks per MD order.        Timed:  Manual Therapy:         mins  39429;  Therapeutic Exercise:    10     mins  39898;     Neuromuscular Nneka:        mins  26530;    Therapeutic Activity:          mins  46426;     Gait Training:           mins  31458;     Ultrasound:          mins  95546;    Electrical Stimulation:         mins  58641 ( );    Untimed:  Electrical Stimulation:         mins  00467 ( );  Mechanical Traction:         mins  73642;     Timed Treatment:   10   mins   Total Treatment:     40   mins    PT SIGNATURE: Drea Reagan, PT   DATE TREATMENT INITIATED: 11/4/2020    Initial Certification  Certification Period: 2/2/2021  I certify that the therapy services are furnished while this patient is under my care.  The services outlined above are required by this patient, and will be reviewed every 90 days.     PHYSICIAN: Lluvia Sterling PA-C      DATE:     Please sign and return via fax to [phone].. Thank you, [facility].

## 2020-11-11 ENCOUNTER — TREATMENT (OUTPATIENT)
Dept: PHYSICAL THERAPY | Facility: CLINIC | Age: 65
End: 2020-11-11

## 2020-11-11 DIAGNOSIS — M25.532 LEFT WRIST PAIN: Primary | ICD-10-CM

## 2020-11-11 PROCEDURE — 97110 THERAPEUTIC EXERCISES: CPT | Performed by: PHYSICAL THERAPIST

## 2020-11-11 PROCEDURE — 97140 MANUAL THERAPY 1/> REGIONS: CPT | Performed by: PHYSICAL THERAPIST

## 2020-11-11 NOTE — PROGRESS NOTES
Physical Therapy Daily Progress Note  VISIT: 2      Keshav Gu reports: no wrist pain upon arrival today.  She has done well with HEP.    Subjective     Treatment  Pre-treatment pain:  0  Post-treatment pain:  0  Exercise 1  Exercise Name 1: HEP review  Exercise 2  Exercise Name 2: provided firm theraputty to progress  strength             Objective          Active Range of Motion     Left Wrist   Wrist flexion: 55 degrees   Wrist extension: 60 degrees   Radial deviation: 32 degrees   Ulnar deviation: 35 degrees     Additional Active Range of Motion Details  Pt demonstrates significant improvement in L wrist ROM since initial evaluation 1 week ago.    Strength/Myotome Testing     Left Wrist/Hand      (2nd hand position)     Trial 1: 30 lbs        Assessment & Plan     Assessment  Assessment details: Pt demonstrates excellent improvement in left wrist ROM as well as improvement in left .      Plan  Plan details: Continue PT.  Progress ROM and  strength as tolerated.  PA recommended wrist strengthening beginning 11/30/2020.               Timed:  Manual Therapy:    10     mins  89064;  Therapeutic Exercise:    10     mins  97634;     Neuromuscular Nneka:        mins  83868;    Therapeutic Activity:          mins  92429;     Gait Training:           mins  10702;     Ultrasound:          mins  16756;    Electrical Stimulation:         mins  41573 ( );    Untimed:  Electrical Stimulation:         mins  36367 ( );  Mechanical Traction:         mins  32458;     Timed Treatment:   20   mins   Total Treatment:     20   mins      Drea Reagan, PT  Physical Therapist

## 2020-11-18 ENCOUNTER — TREATMENT (OUTPATIENT)
Dept: PHYSICAL THERAPY | Facility: CLINIC | Age: 65
End: 2020-11-18

## 2020-11-18 DIAGNOSIS — M25.532 LEFT WRIST PAIN: Primary | ICD-10-CM

## 2020-11-18 PROCEDURE — 97110 THERAPEUTIC EXERCISES: CPT | Performed by: PHYSICAL THERAPIST

## 2020-11-18 PROCEDURE — 97140 MANUAL THERAPY 1/> REGIONS: CPT | Performed by: PHYSICAL THERAPIST

## 2020-11-18 NOTE — PROGRESS NOTES
Physical Therapy Daily Progress Note  VISIT: 3      Keshav Gu reports: no pain upon arrival today.      Subjective     Treatment  Pre-treatment pain:  0  Post-treatment pain:  0  Exercise 1  Exercise Name 1: Wrist extensor stretch  Exercise 2  Exercise Name 2: wrist flexor stretch  Exercise 3  Exercise Name 3: HEP additions provided/practiced             Objective          Strength/Myotome Testing     Left Wrist/Hand      (2nd hand position)    Trial 1: 35 lbs        Assessment & Plan     Assessment  Assessment details: Pt demonstrates steady improvement in  and wrist ROM.      Plan  Plan details: Follow up in 2 weeks.  Progress to strengthening of L wrist.               Timed:  Manual Therapy:    10     mins  26589;  Therapeutic Exercise:    15     mins  48994;     Neuromuscular Nneka:        mins  35816;    Therapeutic Activity:          mins  58406;     Gait Training:           mins  61539;     Ultrasound:          mins  35676;    Electrical Stimulation:         mins  06824 ( );    Untimed:  Electrical Stimulation:         mins  54243 ( );  Mechanical Traction:         mins  48211;     Timed Treatment:   25   mins   Total Treatment:     25   mins      Drea Reagan, PT  Physical Therapist

## 2020-12-02 ENCOUNTER — OFFICE VISIT (OUTPATIENT)
Dept: FAMILY MEDICINE CLINIC | Facility: CLINIC | Age: 65
End: 2020-12-02

## 2020-12-02 ENCOUNTER — LAB (OUTPATIENT)
Dept: LAB | Facility: HOSPITAL | Age: 65
End: 2020-12-02

## 2020-12-02 ENCOUNTER — TREATMENT (OUTPATIENT)
Dept: PHYSICAL THERAPY | Facility: CLINIC | Age: 65
End: 2020-12-02

## 2020-12-02 VITALS
OXYGEN SATURATION: 97 % | HEART RATE: 86 BPM | DIASTOLIC BLOOD PRESSURE: 78 MMHG | WEIGHT: 172 LBS | BODY MASS INDEX: 24.62 KG/M2 | SYSTOLIC BLOOD PRESSURE: 110 MMHG | HEIGHT: 70 IN

## 2020-12-02 DIAGNOSIS — M25.532 LEFT WRIST PAIN: Primary | ICD-10-CM

## 2020-12-02 DIAGNOSIS — Z13.1 SCREENING FOR DIABETES MELLITUS: ICD-10-CM

## 2020-12-02 DIAGNOSIS — M85.88 OTHER SPECIFIED DISORDERS OF BONE DENSITY AND STRUCTURE, OTHER SITE: ICD-10-CM

## 2020-12-02 DIAGNOSIS — N30.01 ACUTE CYSTITIS WITH HEMATURIA: ICD-10-CM

## 2020-12-02 DIAGNOSIS — Z13.0 SCREENING FOR DEFICIENCY ANEMIA: ICD-10-CM

## 2020-12-02 DIAGNOSIS — M54.50 ACUTE LOW BACK PAIN WITHOUT SCIATICA, UNSPECIFIED BACK PAIN LATERALITY: ICD-10-CM

## 2020-12-02 DIAGNOSIS — I10 ESSENTIAL HYPERTENSION: ICD-10-CM

## 2020-12-02 DIAGNOSIS — Z13.29 SCREENING FOR THYROID DISORDER: ICD-10-CM

## 2020-12-02 DIAGNOSIS — Z12.31 VISIT FOR SCREENING MAMMOGRAM: ICD-10-CM

## 2020-12-02 DIAGNOSIS — Z00.00 WELL ADULT EXAM: Primary | ICD-10-CM

## 2020-12-02 LAB
ALBUMIN SERPL-MCNC: 4.4 G/DL (ref 3.5–5.2)
ALBUMIN/GLOB SERPL: 1.6 G/DL
ALP SERPL-CCNC: 56 U/L (ref 39–117)
ALT SERPL W P-5'-P-CCNC: 19 U/L (ref 1–33)
ANION GAP SERPL CALCULATED.3IONS-SCNC: 5.7 MMOL/L (ref 5–15)
AST SERPL-CCNC: 24 U/L (ref 1–32)
BASOPHILS # BLD AUTO: 0.07 10*3/MM3 (ref 0–0.2)
BASOPHILS NFR BLD AUTO: 1.5 % (ref 0–1.5)
BILIRUB BLD-MCNC: NEGATIVE MG/DL
BILIRUB SERPL-MCNC: 0.4 MG/DL (ref 0–1.2)
BUN SERPL-MCNC: 22 MG/DL (ref 8–23)
BUN/CREAT SERPL: 31 (ref 7–25)
CALCIUM SPEC-SCNC: 9.3 MG/DL (ref 8.6–10.5)
CHLORIDE SERPL-SCNC: 103 MMOL/L (ref 98–107)
CHOLEST SERPL-MCNC: 224 MG/DL (ref 0–200)
CLARITY, POC: ABNORMAL
CO2 SERPL-SCNC: 29.3 MMOL/L (ref 22–29)
COLOR UR: YELLOW
CREAT SERPL-MCNC: 0.71 MG/DL (ref 0.57–1)
DEPRECATED RDW RBC AUTO: 37.6 FL (ref 37–54)
EOSINOPHIL # BLD AUTO: 0.16 10*3/MM3 (ref 0–0.4)
EOSINOPHIL NFR BLD AUTO: 3.3 % (ref 0.3–6.2)
ERYTHROCYTE [DISTWIDTH] IN BLOOD BY AUTOMATED COUNT: 12.2 % (ref 12.3–15.4)
GFR SERPL CREATININE-BSD FRML MDRD: 83 ML/MIN/1.73
GLOBULIN UR ELPH-MCNC: 2.7 GM/DL
GLUCOSE SERPL-MCNC: 90 MG/DL (ref 65–99)
GLUCOSE UR STRIP-MCNC: NEGATIVE MG/DL
HCT VFR BLD AUTO: 38.5 % (ref 34–46.6)
HDLC SERPL-MCNC: 75 MG/DL (ref 40–60)
HGB BLD-MCNC: 12.8 G/DL (ref 12–15.9)
IMM GRANULOCYTES # BLD AUTO: 0.01 10*3/MM3 (ref 0–0.05)
IMM GRANULOCYTES NFR BLD AUTO: 0.2 % (ref 0–0.5)
KETONES UR QL: NEGATIVE
LDLC SERPL CALC-MCNC: 140 MG/DL (ref 0–100)
LDLC/HDLC SERPL: 1.85 {RATIO}
LEUKOCYTE EST, POC: NEGATIVE
LYMPHOCYTES # BLD AUTO: 1.66 10*3/MM3 (ref 0.7–3.1)
LYMPHOCYTES NFR BLD AUTO: 34.7 % (ref 19.6–45.3)
MCH RBC QN AUTO: 28.4 PG (ref 26.6–33)
MCHC RBC AUTO-ENTMCNC: 33.2 G/DL (ref 31.5–35.7)
MCV RBC AUTO: 85.6 FL (ref 79–97)
MONOCYTES # BLD AUTO: 0.37 10*3/MM3 (ref 0.1–0.9)
MONOCYTES NFR BLD AUTO: 7.7 % (ref 5–12)
NEUTROPHILS NFR BLD AUTO: 2.51 10*3/MM3 (ref 1.7–7)
NEUTROPHILS NFR BLD AUTO: 52.6 % (ref 42.7–76)
NITRITE UR-MCNC: NEGATIVE MG/ML
NRBC BLD AUTO-RTO: 0 /100 WBC (ref 0–0.2)
PH UR: 6 [PH] (ref 5–8)
PLATELET # BLD AUTO: 282 10*3/MM3 (ref 140–450)
PMV BLD AUTO: 11 FL (ref 6–12)
POTASSIUM SERPL-SCNC: 4.2 MMOL/L (ref 3.5–5.2)
PROT SERPL-MCNC: 7.1 G/DL (ref 6–8.5)
PROT UR STRIP-MCNC: ABNORMAL MG/DL
RBC # BLD AUTO: 4.5 10*6/MM3 (ref 3.77–5.28)
RBC # UR STRIP: ABNORMAL /UL
SODIUM SERPL-SCNC: 138 MMOL/L (ref 136–145)
SP GR UR: 1.02 (ref 1–1.03)
TRIGL SERPL-MCNC: 50 MG/DL (ref 0–150)
TSH SERPL DL<=0.05 MIU/L-ACNC: 1.85 UIU/ML (ref 0.27–4.2)
UROBILINOGEN UR QL: NORMAL
VLDLC SERPL-MCNC: 9 MG/DL (ref 5–40)
WBC # BLD AUTO: 4.78 10*3/MM3 (ref 3.4–10.8)

## 2020-12-02 PROCEDURE — 80061 LIPID PANEL: CPT

## 2020-12-02 PROCEDURE — 85025 COMPLETE CBC W/AUTO DIFF WBC: CPT

## 2020-12-02 PROCEDURE — 99396 PREV VISIT EST AGE 40-64: CPT | Performed by: FAMILY MEDICINE

## 2020-12-02 PROCEDURE — 97140 MANUAL THERAPY 1/> REGIONS: CPT | Performed by: PHYSICAL THERAPIST

## 2020-12-02 PROCEDURE — 84443 ASSAY THYROID STIM HORMONE: CPT

## 2020-12-02 PROCEDURE — 80053 COMPREHEN METABOLIC PANEL: CPT

## 2020-12-02 PROCEDURE — 97110 THERAPEUTIC EXERCISES: CPT | Performed by: PHYSICAL THERAPIST

## 2020-12-02 PROCEDURE — 81003 URINALYSIS AUTO W/O SCOPE: CPT | Performed by: FAMILY MEDICINE

## 2020-12-02 RX ORDER — CIPROFLOXACIN 250 MG/1
250 TABLET, FILM COATED ORAL EVERY 12 HOURS SCHEDULED
Qty: 6 TABLET | Refills: 0 | Status: SHIPPED | OUTPATIENT
Start: 2020-12-02 | End: 2020-12-05

## 2020-12-02 NOTE — PROGRESS NOTES
Physical Therapy Daily Progress Note/Discharge Note    Patient: Keshav Gu   : 1955  Diagnosis/ICD-10 Code:  The encounter diagnosis was Left wrist pain.   Referring practitioner: Lluvia Sterling, *  Date of Initial Visit: Type: THERAPY  Noted: 2020  Today's Date: 2020  Visit:  4     Keshav Gu reports: mild soreness after increased use raking leaves, but otherwise wrist is feeling better.    Subjective     Treatment  Pre-treatment pain:  1  Post-treatment pain:  1  Exercise 1  Exercise Name 1: Reassessment of ROM and strength L wrist  Exercise 2  Exercise Name 2: HEP progression to include resistance exercises             Objective          Active Range of Motion     Left Wrist   Wrist flexion: 60 degrees   Wrist extension: 55 degrees   Radial deviation: 25 degrees   Ulnar deviation: 35 degrees     Additional Active Range of Motion Details  Pronation/supination WNL's    Strength/Myotome Testing     Left Elbow   Forearm supination: 4+  Forearm pronation: 4+    Left Wrist/Hand   Wrist extension: 4+  Wrist flexion: 4+  Radial deviation: 4+  Ulnar deviation: 4+     (2nd hand position)   lbs: 40        Assessment & Plan     Assessment  Assessment details: Pt demonstrates steady improvement in L wrist ROM.  She is now able to begin strengthening and is expected to continue to improve.    4 week goals  Pt. demonstrates independence and compliance with initial HEP-met.  Pt. reports reduction in pain intensity to no worse than 3/10 on NPRS-met.  AROM of L wrist shows improvement over baseline measures-met.  L  is improved by 5#-met.     Continue PT to restore full ROM and strength to L wrist.       Timed:  Manual Therapy:    10     mins  19107;  Therapeutic Exercise:    25     mins  79029;     Neuromuscular Nneka:        mins  14074;    Therapeutic Activity:          mins  78188;     Gait Training:           mins  40225;     Ultrasound:          mins  41428;    Electrical  Stimulation:         mins  19997 ( );    Untimed:  Electrical Stimulation:         mins  85836 ( );  Mechanical Traction:         mins  97696;     Timed Treatment:   35   mins   Total Treatment:     35   mins      Drea Reagan PT  Physical Therapist

## 2020-12-02 NOTE — PATIENT INSTRUCTIONS
Recombinant Zoster (Shingles) Vaccine: What You Need to Know  1. Why get vaccinated?  Recombinant zoster (shingles) vaccine can prevent shingles.  Shingles (also called herpes zoster, or just zoster) is a painful skin rash, usually with blisters. In addition to the rash, shingles can cause fever, headache, chills, or upset stomach. More rarely, shingles can lead to pneumonia, hearing problems, blindness, brain inflammation (encephalitis), or death.  The most common complication of shingles is long-term nerve pain called postherpetic neuralgia (PHN). PHN occurs in the areas where the shingles rash was, even after the rash clears up. It can last for months or years after the rash goes away. The pain from PHN can be severe and debilitating.  About 10 to 18% of people who get shingles will experience PHN. The risk of PHN increases with age. An older adult with shingles is more likely to develop PHN and have longer lasting and more severe pain than a younger person with shingles.  Shingles is caused by the varicella zoster virus, the same virus that causes chickenpox. After you have chickenpox, the virus stays in your body and can cause shingles later in life. Shingles cannot be passed from one person to another, but the virus that causes shingles can spread and cause chickenpox in someone who had never had chickenpox or received chickenpox vaccine.  2. Recombinant shingles vaccine  Recombinant shingles vaccine provides strong protection against shingles. By preventing shingles, recombinant shingles vaccine also protects against PHN.  Recombinant shingles vaccine is the preferred vaccine for the prevention of shingles. However, a different vaccine, live shingles vaccine, may be used in some circumstances.  The recombinant shingles vaccine is recommended for adults 50 years and older without serious immune problems. It is given as a two-dose series.  This vaccine is also recommended for people who have already gotten  another type of shingles vaccine, the live shingles vaccine. There is no live virus in this vaccine.  Shingles vaccine may be given at the same time as other vaccines.  3. Talk with your health care provider  Tell your vaccine provider if the person getting the vaccine:  · Has had an allergic reaction after a previous dose of recombinant shingles vaccine, or has any severe, life-threatening allergies.  · Is pregnant or breastfeeding.  · Is currently experiencing an episode of shingles.  In some cases, your health care provider may decide to postpone shingles vaccination to a future visit.  People with minor illnesses, such as a cold, may be vaccinated. People who are moderately or severely ill should usually wait until they recover before getting recombinant shingles vaccine.  Your health care provider can give you more information.  4. Risks of a vaccine reaction  · A sore arm with mild or moderate pain is very common after recombinant shingles vaccine, affecting about 80% of vaccinated people. Redness and swelling can also happen at the site of the injection.  · Tiredness, muscle pain, headache, shivering, fever, stomach pain, and nausea happen after vaccination in more than half of people who receive recombinant shingles vaccine.  In clinical trials, about 1 out of 6 people who got recombinant zoster vaccine experienced side effects that prevented them from doing regular activities. Symptoms usually went away on their own in 2 to 3 days.  You should still get the second dose of recombinant zoster vaccine even if you had one of these reactions after the first dose.  People sometimes faint after medical procedures, including vaccination. Tell your provider if you feel dizzy or have vision changes or ringing in the ears.  As with any medicine, there is a very remote chance of a vaccine causing a severe allergic reaction, other serious injury, or death.  5. What if there is a serious problem?  An allergic reaction  could occur after the vaccinated person leaves the clinic. If you see signs of a severe allergic reaction (hives, swelling of the face and throat, difficulty breathing, a fast heartbeat, dizziness, or weakness), call 9-1-1 and get the person to the nearest hospital.  For other signs that concern you, call your health care provider.  Adverse reactions should be reported to the Vaccine Adverse Event Reporting System (VAERS). Your health care provider will usually file this report, or you can do it yourself. Visit the VAERS website at www.vaers.[facility].gov or call 1-816.786.8061. VAERS is only for reporting reactions, and VAERS staff do not give medical advice.  6. How can I learn more?  · Ask your health care provider.  · Call your local or state health department.  · Contact the Centers for Disease Control and Prevention (CDC):  ? Call 1-231.421.2739 (9-030-LEV-INFO) or  ? Visit CDC's website at www.cdc.gov/vaccines  Vaccine Information Statement Recombinant Zoster Vaccine (10/30/2019)  This information is not intended to replace advice given to you by your health care provider. Make sure you discuss any questions you have with your health care provider.  Document Revised: 04/07/2020 Document Reviewed: 07/24/2019  Elsevier Patient Education © 2020 [employer]

## 2020-12-02 NOTE — PROGRESS NOTES
Keshav Gu presents today for a physical    Chief Complaint   Patient presents with   • Annual Exam        HPI     Plans to continue Lexapro goal in future reducing medication. She felt some heart palpitations recently and it was around the time of the election. She noticed when in bed and quiet. She had palpitations when she was younger, saw cardiologist and had monitor tested which was normal. She drinks coffee in the morning. Sinus pain frontal and maxillary in the past week. A couple days ago had sneezing fits, runny nose and improving. She recently did yard work., Allergy symptoms year round. She has history of UTIs. Usually sudden blood in urine first symptom. She has lower back pain currently. Nocturia x1 normally, and now it feels hurt a little more in her back.     Review of Systems   Constitutional: Negative for chills and fever.   Cardiovascular: Positive for palpitations.   Gastrointestinal: Negative for nausea.   Genitourinary: Negative for breast lump, breast pain and pelvic pain.   Musculoskeletal: Positive for back pain.        Patient Active Problem List   Diagnosis   • Foot mass, left   • Essential hypertension   • Non-seasonal allergic rhinitis   • Major depression in complete remission (CMS/HCC)   • Chronic interstitial cystitis       Current Outpatient Medications   Medication Sig Dispense Refill   • diclofenac (VOLTAREN) 1 % gel gel Apply 4 g topically to the appropriate area as directed 4 (Four) Times a Day As Needed.     • escitalopram (LEXAPRO) 10 MG tablet Take 1 tablet by mouth Daily. 90 tablet 3   • fluticasone (Flonase) 50 MCG/ACT nasal spray 2 sprays into the nostril(s) as directed by provider Daily. 1 bottle 11   • levocetirizine (XYZAL) 5 MG tablet Take 5 mg by mouth Every Evening.     • losartan (COZAAR) 50 MG tablet Take 1 tablet by mouth Daily. 90 tablet 1   • VITAMIN D PO Take 2 each by mouth.       No current facility-administered medications for this visit.         Allergies   Allergen Reactions   • Bactrim [Sulfamethoxazole-Trimethoprim] Hives   • Macrobid [Nitrofurantoin Macrocrystal] Itching and Swelling        Past Medical History:   Diagnosis Date   • Basal cell carcinoma     right cheek, left chest, left arm   • Colon polyp 12/01/2014   • Depression    • Elevated hemoglobin A1c 01/17/2017    5.7   • Frequent UTI     related to sexual activity   • Hyperlipidemia    • Hypertension 01/17/2017   • Interstitial cystitis    • Migraine headache 01/31/2014   • Mononucleosis    • Osteopenia    • Plantar fasciitis 06/08/2009   • Scoliosis of thoracic spine 2018    xrays   • T8 vertebral fracture (CMS/HCC) 2010   • Vitamin D deficiency 11/25/2015        Past Surgical History:   Procedure Laterality Date   • SKIN CANCER EXCISION          Family History   Problem Relation Age of Onset   • Hyperlipidemia Mother    • Heart attack Mother    • Colon cancer Mother    • Mental illness Mother         congnitive impairment   • Arthritis Mother    • Heart disease Mother         hypertrophic cardiomyopathy   • Stomach cancer Father    • Hypertension Father    • Stroke Maternal Grandmother    • Stroke Maternal Grandfather    • Stroke Paternal Grandmother    • Stroke Paternal Grandfather    • Breast cancer Neg Hx    • Ovarian cancer Neg Hx         Social History     Socioeconomic History   • Marital status:      Spouse name: Not on file   • Number of children: Not on file   • Years of education: Not on file   • Highest education level: Not on file   Occupational History     Comment: non-profit consulting   Tobacco Use   • Smoking status: Never Smoker   • Smokeless tobacco: Never Used   Substance and Sexual Activity   • Alcohol use: Yes     Alcohol/week: 1.0 standard drinks     Types: 1 Glasses of wine per week   • Drug use: No   • Sexual activity: Defer   Social History Narrative    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    Patient's Body mass index is 24.68 kg/m². BMI is within normal parameters. No follow-up required..      Physical Exam  Vitals signs reviewed.   Constitutional:       General: She is not in acute distress.     Appearance: She is not ill-appearing.   HENT:      Right Ear: Hearing, tympanic membrane and ear canal normal.      Left Ear: Hearing, tympanic membrane and ear canal normal.      Nose: Nose normal.      Mouth/Throat:      Mouth: Mucous membranes are moist.      Pharynx: Oropharynx is clear. Uvula midline.      Tonsils: No tonsillar exudate.   Eyes:      General:         Right eye: No discharge.         Left eye: No discharge.      Conjunctiva/sclera: Conjunctivae normal.   Neck:      Musculoskeletal: Neck supple.      Thyroid: No thyromegaly.   Cardiovascular:      Rate and Rhythm: Normal rate and regular rhythm.      Heart sounds: No murmur.   Pulmonary:      Effort: Pulmonary effort is normal. No respiratory distress.      Breath sounds: Normal breath sounds.   Abdominal:      General: There is no distension.      Palpations: Abdomen is soft. There is no hepatomegaly.      Tenderness: There is no abdominal tenderness. There is no guarding or rebound.      Hernia: No hernia is present.   Lymphadenopathy:      Cervical: No cervical adenopathy.   Skin:     General: Skin is warm and dry.      Findings: No rash.   Neurological:      Mental Status: She is alert and oriented to person, place, and time.      Gait: Gait normal.   Psychiatric:         Mood and Affect: Mood normal.         Behavior: Behavior normal.         Thought Content: Thought content normal.         Judgment: Judgment normal.               Immunization History   Administered Date(s) Administered   • Flu Vaccine Quad PF >18YRS 11/20/2015, 11/15/2016, 10/17/2017   • Flulaval/Fluarix/Fluzone Quad " 10/22/2019, 09/17/2020   • Hepatitis A 10/16/2018, 04/16/2019   • Tdap 06/08/2009, 09/17/2020       Health Maintenance   Topic Date Due   • ZOSTER VACCINE (1 of 2) 02/03/2006   • LIPID PANEL  10/16/2018   • DXA SCAN  02/06/2019   • COLONOSCOPY  12/01/2019   • PAP SMEAR  02/22/2020   • MAMMOGRAM  10/25/2020   • TDAP/TD VACCINES (3 - Td) 09/17/2030   • INFLUENZA VACCINE  Completed       Diagnoses and all orders for this visit:    1. Well adult exam (Primary)  Check fasting labs today.  Shingrix vaccine at local pharmacy.  Obtain Pap smear records from OB/GYN.  Schedule mammogram and DEXA same day if possible.  Patient had last colonoscopy in 2014 and was due for repeat in 2019 but has yet to schedule due to transportation, she will schedule when she is able for follow-up.  Advised she does not need daily aspirin for CVD prevention.  Discussed risks and benefits of ibuprofen use which she only uses intermittently.  2. Acute cystitis with hematuria  -     ciprofloxacin (CIPRO) 250 MG tablet; Take 1 tablet by mouth Every 12 (Twelve) Hours for 3 days.  Dispense: 6 tablet; Refill: 0  -     Urine Culture - Urine, Urine, Clean Catch; Future  New.  Allergy to Cipro and Bactrim.  Treat with Cipro.  Send for culture.  3. Acute low back pain without sciatica, unspecified back pain laterality  -     POCT urinalysis dipstick, automated    4. Essential hypertension  Stable.  Continue losartan.  5. Other specified disorders of bone density and structure, other site  -     DEXA Bone Density Axial; Future    6. Visit for screening mammogram  -     Mammo Screening Digital Tomosynthesis Bilateral With CAD; Future        Counseled on health maintenance topics and preventative care recommendations: Cervical cancer screening, bone density screening for osteoporosis, colon cancer screening, breast cancer screening, screening for diabetes, screening thyroid, screening cholesterol, Shingrix vaccine     Return in about 6 months (around  6/2/2021) for Office visit HTN .        Electronically signed by Vonda Alex MD, 12/02/20, 10:37 AM EST.

## 2020-12-03 ENCOUNTER — OFFICE VISIT (OUTPATIENT)
Dept: ORTHOPEDIC SURGERY | Facility: CLINIC | Age: 65
End: 2020-12-03

## 2020-12-03 VITALS — HEART RATE: 92 BPM | WEIGHT: 171.96 LBS | BODY MASS INDEX: 24.62 KG/M2 | HEIGHT: 70 IN | OXYGEN SATURATION: 98 %

## 2020-12-03 DIAGNOSIS — N30.01 ACUTE CYSTITIS WITH HEMATURIA: ICD-10-CM

## 2020-12-03 DIAGNOSIS — S52.572D OTHER CLOSED INTRA-ARTICULAR FRACTURE OF DISTAL END OF LEFT RADIUS WITH ROUTINE HEALING, SUBSEQUENT ENCOUNTER: ICD-10-CM

## 2020-12-03 DIAGNOSIS — Z09 FRACTURE FOLLOW-UP: Primary | ICD-10-CM

## 2020-12-03 PROCEDURE — 99024 POSTOP FOLLOW-UP VISIT: CPT | Performed by: PHYSICIAN ASSISTANT

## 2020-12-03 PROCEDURE — 87086 URINE CULTURE/COLONY COUNT: CPT | Performed by: FAMILY MEDICINE

## 2020-12-03 PROCEDURE — 87077 CULTURE AEROBIC IDENTIFY: CPT | Performed by: FAMILY MEDICINE

## 2020-12-03 PROCEDURE — 87186 SC STD MICRODIL/AGAR DIL: CPT | Performed by: FAMILY MEDICINE

## 2020-12-03 NOTE — PROGRESS NOTES
"    [facility] [place] [facility] Note        Subjective     CC: Follow-up (5 weeks- Other closed intra-articular fracture of distal end of left radius , DOI: 9/18/2020)      CATIA Gu is a 65 y.o. female.  Patient returns for follow-up evaluation of her left wrist.  Date of injury around 9/18/2020 with her initial Ortho appointment 10/8/2020.  Patient currently working with formal PT.  She notes intermittent wrist pain with a pain scale of 1/10.  No reported numbness or tingling.    Overall, patient's symptoms are improved.    ROS:    Constiutional:Pt denies fever, chills, nausea, or vomiting.  MSK:as above        Objective      Past Medical History  Past Medical History:   Diagnosis Date   • Basal cell carcinoma     right cheek, left chest, left arm   • Colon polyp 12/01/2014   • Depression    • Elevated hemoglobin A1c 01/17/2017    5.7   • Frequent UTI     related to sexual activity   • Hyperlipidemia    • Hypertension 01/17/2017   • Interstitial cystitis    • Migraine headache 01/31/2014   • Mononucleosis    • Osteopenia    • Plantar fasciitis 06/08/2009   • Scoliosis of thoracic spine 2018    xrays   • T8 vertebral fracture (CMS/HCC) 2010   • Vitamin D deficiency 11/25/2015         Physical Exam  Pulse 92   Ht 177.8 cm (70\")   Wt 78 kg (171 lb 15.3 oz)   SpO2 98%   BMI 24.67 kg/m²     Body mass index is 24.67 kg/m².    Patient is well nourished and well developed.        Ortho Exam  Left wrist  Skin: Grossly intact without redness, warmth or soft tissue swelling.  Tenderness: Still notes mild tenderness noted to the wrist.  No specific tenderness DRUJ, TFCC or anatomic snuffbox.  Motion: Per PT note from yesterday flexion 60 degrees, extension 55 degrees.  Patient is able to make a composite fist.  Strength: Wrist flexion, wrist extension and intrinsic 4+/5  Motor/sensory: Grossly intact C5-T1  Vascular: 2+ radial pulse with brisk capillary refill noted into each " digit.      Imaging/Labs/EMG Reviewed:  Ordered left wrist plain films.  Imaging read by Dr. Granados.    Imaging Results (Last 24 Hours)     Procedure Component Value Units Date/Time    XR Wrist 3+ View Left [[id number]] Resulted: 12/03/20 1233     Updated: 12/03/20 1234    Narrative:      Left Wrist X-Ray    Indication: Pain    Views:  AP, Lateral, and Oblique     Comparison: Left wrist 10/29/2020    Findings:  Fracture of the patient's distal radius is healed compared to prior   studies.  New bone is visible.  No bony lesion  Normal soft tissues  Normal joint spaces    Impression:   Healed left distal radius fracture.          Assessment:  1. Fracture follow-up    2. Other closed intra-articular fracture of distal end of left radius with routine healing, subsequent encounter        Plan:  1. Left wrist intra-articular distal radius fracture, stable and healed.  No change in alignment from prior films.  2. Continue working with formal PT.  This does include strengthening.  When she is met all of their criteria they can discharge her from their clinic.  3. Follow-up our clinic as needed.  4. Questions and concerns answered.     History, exam and imaging discussed with Dr. Conteh who agrees with the above assessment and plan.      Lluvia Sterling PA-C  12/03/20  13:09 EST      Dragon disclaimer:  Much of this encounter note is an electronic transcription/translation of spoken language to printed text. The electronic translation of spoken language may permit erroneous, or at times, nonsensical words or phrases to be inadvertently transcribed; Although I have reviewed the note for such errors, some may still exist.  Answers for HPI/ROS submitted by the patient on 12/3/2020   What is the primary reason for your visit?: Other  Please describe your symptoms.: followup to wrist fracture  Have you had these symptoms before?: Yes  How long have you been having these symptoms?: Greater than 2 weeks  Please list any  medications you are currently taking for this condition.: None  Please describe any probable cause for these symptoms. : Fall

## 2020-12-05 LAB — BACTERIA SPEC AEROBE CULT: ABNORMAL

## 2020-12-07 ENCOUNTER — TELEPHONE (OUTPATIENT)
Dept: FAMILY MEDICINE CLINIC | Facility: CLINIC | Age: 65
End: 2020-12-07

## 2020-12-07 ENCOUNTER — LAB (OUTPATIENT)
Dept: LAB | Facility: HOSPITAL | Age: 65
End: 2020-12-07

## 2020-12-07 DIAGNOSIS — N30.01 ACUTE CYSTITIS WITH HEMATURIA: ICD-10-CM

## 2020-12-07 DIAGNOSIS — N30.01 ACUTE CYSTITIS WITH HEMATURIA: Primary | ICD-10-CM

## 2020-12-07 LAB
BILIRUB UR QL STRIP: NEGATIVE
CLARITY UR: ABNORMAL
COLOR UR: YELLOW
GLUCOSE UR STRIP-MCNC: NEGATIVE MG/DL
HGB UR QL STRIP.AUTO: NEGATIVE
KETONES UR QL STRIP: ABNORMAL
LEUKOCYTE ESTERASE UR QL STRIP.AUTO: NEGATIVE
NITRITE UR QL STRIP: NEGATIVE
PH UR STRIP.AUTO: 6 [PH] (ref 5–8)
PROT UR QL STRIP: NEGATIVE
SP GR UR STRIP: 1.03 (ref 1–1.03)
UROBILINOGEN UR QL STRIP: ABNORMAL

## 2020-12-07 PROCEDURE — 81003 URINALYSIS AUTO W/O SCOPE: CPT

## 2020-12-07 NOTE — TELEPHONE ENCOUNTER
Please go to the [facility] to provide a urine specimen.  I need to check and see if she has a resistant strain of bacteria before prescribe more antibiotics.

## 2020-12-07 NOTE — TELEPHONE ENCOUNTER
Patient states that she was recently seen for a UTI and took medicine, but it is not any better and wondered if she could get some more medicine called in.  Her pharmacy is [address]  She can be reached at [phone]

## 2020-12-08 DIAGNOSIS — N30.01 ACUTE CYSTITIS WITH HEMATURIA: Primary | ICD-10-CM

## 2020-12-09 ENCOUNTER — TREATMENT (OUTPATIENT)
Dept: PHYSICAL THERAPY | Facility: CLINIC | Age: 65
End: 2020-12-09

## 2020-12-09 DIAGNOSIS — M25.532 LEFT WRIST PAIN: Primary | ICD-10-CM

## 2020-12-09 PROCEDURE — 97140 MANUAL THERAPY 1/> REGIONS: CPT | Performed by: PHYSICAL THERAPIST

## 2020-12-09 PROCEDURE — 97110 THERAPEUTIC EXERCISES: CPT | Performed by: PHYSICAL THERAPIST

## 2020-12-09 NOTE — PROGRESS NOTES
Physical Therapy Daily Progress Note    Patient: Keshav Gu   : 1955  Diagnosis/ICD-10 Code:  The encounter diagnosis was Left wrist pain.   Referring practitioner: Lluvia Sterling, *  Date of Initial Visit: Type: THERAPY  Noted: 2020  Today's Date: 2020  Visit:  5     Keshav Gu reports: no pain right now and only minimal tightness/discomfort intermittently.  She was released from orthopedic care last week.    Subjective     Treatment  Pre-treatment pain:  0  Post-treatment pain:  0    Quick DASH score 13.64    L wrist ROM and strength are WNL's.       Manual Rx 1  Manual Rx 1 Location: L wrist  Manual Rx 1 Type: PROM all planes, gentle L wrist distraction and mobilization to improve flexion/extension        Objective     Assessment & Plan     Assessment  Assessment details: Pt is pleased with her progress.  She feels that she can continue independently with HEP as she resumes her usual functional activities.  L wrist ROM and strength are WNL's.    Plan  Plan details: Pt will continue HEP.  No additional PT visits are planned at this time.  Pt may reschedule if problems arise within the next 30 days.        Plan Goals: 4 weeks  Pt. demonstrates independence and compliance with initial HEP-met.  Pt. reports reduction in pain intensity to no worse than 3/10 on NPRS-met.  AROM of L wrist shows improvement over baseline measures-met.  L  is improved by 5#-met.       Timed:  Manual Therapy:    10     mins  23038;  Therapeutic Exercise:    15     mins  58171;     Neuromuscular Nneka:        mins  74394;    Therapeutic Activity:          mins  17396;     Gait Training:           mins  39727;     Ultrasound:          mins  12365;    Electrical Stimulation:         mins  39727 ( );    Untimed:  Electrical Stimulation:         mins  72293 ( );  Mechanical Traction:         mins  19577;     Timed Treatment:   25   mins   Total Treatment:     25    mins      Drea Reagan, PT  Physical Therapist

## 2020-12-10 LAB
BACTERIA UR CULT: NORMAL
BACTERIA UR CULT: NORMAL

## 2020-12-23 ENCOUNTER — APPOINTMENT (OUTPATIENT)
Dept: MAMMOGRAPHY | Facility: HOSPITAL | Age: 65
End: 2020-12-23

## 2021-01-11 ENCOUNTER — DOCUMENTATION (OUTPATIENT)
Dept: PHYSICAL THERAPY | Facility: CLINIC | Age: 66
End: 2021-01-11

## 2021-01-11 NOTE — PROGRESS NOTES
Discharge Summary  Discharge Summary from Physical Therapy Report      Patient: Keshav Gu   : 1955  Diagnosis/ICD-10 Code:  There were no encounter diagnoses.   Referring practitioner: No ref. provider found  Date of Initial Visit: No linked episodes  Today's Date: 2021  Date of Last Visit: 20     Number of Visits: 4    Discharge Status of Patient: Goals met.     Goals: All Met    Discharge Plan: Continue with current home exercise program as instructed      Date of Discharge: 2021        Drea Reagan, PT

## 2021-03-03 ENCOUNTER — OFFICE VISIT (OUTPATIENT)
Dept: FAMILY MEDICINE CLINIC | Facility: CLINIC | Age: 66
End: 2021-03-03

## 2021-03-03 VITALS
OXYGEN SATURATION: 98 % | HEIGHT: 70 IN | SYSTOLIC BLOOD PRESSURE: 116 MMHG | HEART RATE: 69 BPM | DIASTOLIC BLOOD PRESSURE: 76 MMHG | BODY MASS INDEX: 24.48 KG/M2 | WEIGHT: 171 LBS

## 2021-03-03 DIAGNOSIS — R00.2 PALPITATIONS: Primary | ICD-10-CM

## 2021-03-03 PROCEDURE — 99214 OFFICE O/P EST MOD 30 MIN: CPT | Performed by: FAMILY MEDICINE

## 2021-03-03 NOTE — PROGRESS NOTES
"Chief Complaint  Palpitations (can feel more so at night. )    Subjective          Keshav Gu presents to [facility]  History of Present Illness    Palpitations more frequent. She feels it at night trying to go to sleep. Sensation of fluttering. Lasting around a minute. No shortness of breath or dizziness. No chest discomfort or tightness. No heaviness.   A few days of lightheaded but she hadn't eaten yet. She also had sinus pressure. FH hypertrophic cardiomyopathy, she had ECHO 15 years ago and was normal. Holter monitor 30 years ago for palpations but didn't find abnormalities.  She has cut back on caffeine to 1/2 caf coffee 2 cups.          Objective   Vital Signs:   /76 (BP Location: Right arm, Patient Position: Sitting, Cuff Size: Adult)   Pulse 69   Ht 177.8 cm (70\")   Wt 77.6 kg (171 lb)   SpO2 98%   BMI 24.54 kg/m²     Physical Exam  Vitals signs reviewed.   Constitutional:       General: She is not in acute distress.     Appearance: She is not ill-appearing.   Cardiovascular:      Rate and Rhythm: Normal rate and regular rhythm.   Pulmonary:      Effort: Pulmonary effort is normal.      Breath sounds: Normal breath sounds.   Neurological:      Mental Status: She is alert.        Result Review :   The following data was reviewed by: Vonda Alex MD on 03/03/2021:  Common labs    Common Labsle 12/2/20 12/2/20 12/2/20    1051 1051 1051   Glucose  90    BUN  22    Creatinine  0.71    eGFR Non African Am  83    Sodium  138    Potassium  4.2    Chloride  103    Calcium  9.3    Albumin  4.40    Total Bilirubin  0.4    Alkaline Phosphatase  56    AST (SGOT)  24    ALT (SGPT)  19    WBC 4.78     Hemoglobin 12.8     Hematocrit 38.5     Platelets 282     Total Cholesterol   224 (A)   Triglycerides   50   HDL Cholesterol   75 (A)   LDL Cholesterol    140 (A)   (A) Abnormal value                      Assessment and Plan    Diagnoses and all orders for " this visit:    1. Palpitations (Primary)  -     Holter monitor - 48 hour; Future    New.  Normal cardiac exam today.  She is having symptoms on a nightly basis and we will further evaluate with Holter monitor.  Reviewed labs from her physical in December showing normal thyroid function and electrolytes.  She has tried cutting down on caffeine but is still symptomatic.  Plan follow-up after results are available.    Follow Up   Return if symptoms worsen or fail to improve.  Patient was given instructions and counseling regarding her condition or for health maintenance advice. Please see specific information pulled into the AVS if appropriate.     Electronically signed by Vonda Alex MD, 03/03/21, 9:20 AM EST.

## 2021-03-19 ENCOUNTER — HOSPITAL ENCOUNTER (OUTPATIENT)
Dept: BONE DENSITY | Facility: HOSPITAL | Age: 66
Discharge: HOME | End: 2021-03-19
Admitting: FAMILY MEDICINE

## 2021-03-19 ENCOUNTER — APPOINTMENT (OUTPATIENT)
Dept: MAMMOGRAPHY | Facility: HOSPITAL | Age: 66
End: 2021-03-19

## 2021-03-19 DIAGNOSIS — M85.88 OTHER SPECIFIED DISORDERS OF BONE DENSITY AND STRUCTURE, OTHER SITE: ICD-10-CM

## 2021-03-19 PROCEDURE — 77080 DXA BONE DENSITY AXIAL: CPT

## 2021-03-22 ENCOUNTER — TELEPHONE (OUTPATIENT)
Dept: FAMILY MEDICINE CLINIC | Facility: CLINIC | Age: 66
End: 2021-03-22

## 2021-03-22 NOTE — TELEPHONE ENCOUNTER
Spoke with pt today to relay message regarding results still pending. Advised her to call back if she hadn't heard anything from us by the first of next week and we could look into it further.

## 2021-03-22 NOTE — TELEPHONE ENCOUNTER
Caller: Keshav Gu    Relationship: Self    Best call back number: [phone]    Caller requesting test results: YES    What test was performed: HOLTER MONITOR    When was the test performed: 3/4/21    Additional notes: PATIENT WOULD LIKE A CALL BACK WITH HER RESULTS AS SOON AS POSSIBLE

## 2021-04-23 ENCOUNTER — APPOINTMENT (OUTPATIENT)
Dept: MAMMOGRAPHY | Facility: HOSPITAL | Age: 66
End: 2021-04-23

## 2021-05-13 DIAGNOSIS — F32.5 MAJOR DEPRESSION IN COMPLETE REMISSION (HCC): ICD-10-CM

## 2021-05-13 RX ORDER — ESCITALOPRAM OXALATE 10 MG/1
10 TABLET ORAL DAILY
Qty: 90 TABLET | Refills: 3 | Status: SHIPPED | OUTPATIENT
Start: 2021-05-13 | End: 2021-12-14 | Stop reason: SDUPTHER

## 2021-05-18 DIAGNOSIS — I10 ESSENTIAL HYPERTENSION: ICD-10-CM

## 2021-05-20 RX ORDER — LOSARTAN POTASSIUM 50 MG/1
50 TABLET ORAL DAILY
Qty: 90 TABLET | Refills: 1 | Status: SHIPPED | OUTPATIENT
Start: 2021-05-20 | End: 2021-11-08

## 2021-06-12 ENCOUNTER — HOSPITAL ENCOUNTER (OUTPATIENT)
Dept: MAMMOGRAPHY | Facility: HOSPITAL | Age: 66
Discharge: HOME | End: 2021-06-12
Admitting: FAMILY MEDICINE

## 2021-06-12 DIAGNOSIS — Z12.31 VISIT FOR SCREENING MAMMOGRAM: ICD-10-CM

## 2021-06-12 PROCEDURE — 77067 SCR MAMMO BI INCL CAD: CPT | Performed by: RADIOLOGY

## 2021-06-12 PROCEDURE — 77063 BREAST TOMOSYNTHESIS BI: CPT | Performed by: RADIOLOGY

## 2021-06-12 PROCEDURE — 77063 BREAST TOMOSYNTHESIS BI: CPT

## 2021-06-12 PROCEDURE — 77067 SCR MAMMO BI INCL CAD: CPT

## 2021-07-13 ENCOUNTER — OFFICE VISIT (OUTPATIENT)
Dept: FAMILY MEDICINE CLINIC | Facility: CLINIC | Age: 66
End: 2021-07-13

## 2021-07-13 VITALS
HEIGHT: 70 IN | OXYGEN SATURATION: 98 % | BODY MASS INDEX: 24.48 KG/M2 | TEMPERATURE: 98 F | DIASTOLIC BLOOD PRESSURE: 78 MMHG | HEART RATE: 78 BPM | SYSTOLIC BLOOD PRESSURE: 120 MMHG | WEIGHT: 171 LBS

## 2021-07-13 DIAGNOSIS — M25.562 CHRONIC PAIN OF LEFT KNEE: ICD-10-CM

## 2021-07-13 DIAGNOSIS — Z23 NEED FOR VACCINATION: ICD-10-CM

## 2021-07-13 DIAGNOSIS — G89.29 CHRONIC PAIN OF LEFT KNEE: ICD-10-CM

## 2021-07-13 DIAGNOSIS — I10 ESSENTIAL HYPERTENSION: Primary | Chronic | ICD-10-CM

## 2021-07-13 PROCEDURE — 99214 OFFICE O/P EST MOD 30 MIN: CPT | Performed by: FAMILY MEDICINE

## 2021-07-13 NOTE — PROGRESS NOTES
"Chief Complaint  Hypertension (Pt is here for a f/u. ) and Knee Pain (Pt states she has been having some left knee pain that comes and goes. She states this started all of  a sudden and has been going on for about 3 weeks.She states she can notice it more when she is walking downhill or downstairs. She states she has been doing exercises at home and she thinks it is helping. She states she is taking care of her grandson and is in the floor a lot more on her knees.  )    Subjective          Keshav Gu presents to [facility]  Hypertension  This is a chronic problem. The problem is controlled. Current antihypertension treatment includes angiotensin blockers.   Knee Pain   The incident occurred more than 1 week ago. There was no injury mechanism. The pain is present in the left knee. The pain has been fluctuating since onset. She has tried NSAIDs for the symptoms.     Answers for HPI/ROS submitted by the patient on 7/6/2021  Please describe your symptoms.: This is an annual checkup. No new issues other than occasional knee pain.  Have you had these symptoms before?: No  How long have you been having these symptoms?: Greater than 2 weeks  Please list any medications you are currently taking for this condition.: Ibuprofen rarely  What is the primary reason for your visit?: Other    Grandson 9 months old. She has been sitting on the floor and kneeling. She was getting up while holding him, seemed to put strain on her knee. Knee cap, popping laterally and pain medially. Audible and sensed pop. No locking. She had knee swelling 30 years ago, patella was rubbing. She has home exercises PT.         Objective   Vital Signs:   /78   Pulse 78   Temp 98 °F (36.7 °C)   Ht 177.8 cm (70\")   Wt 77.6 kg (171 lb)   SpO2 98%   BMI 24.54 kg/m²     Physical Exam  Vitals reviewed.   Constitutional:       General: She is not in acute distress.     Appearance: She is not ill-appearing. "   Cardiovascular:      Rate and Rhythm: Normal rate and regular rhythm.   Pulmonary:      Effort: Pulmonary effort is normal.      Breath sounds: Normal breath sounds.   Musculoskeletal:      Left knee: Crepitus present. No swelling, deformity, effusion, erythema, ecchymosis or bony tenderness. Normal range of motion. No LCL laxity, MCL laxity, ACL laxity or PCL laxity.Normal meniscus and normal patellar mobility.      Instability Tests: Anterior drawer test negative. Posterior drawer test negative. Anterior Lachman test negative. Medial Addy test negative and lateral Addy test negative.   Neurological:      Mental Status: She is alert.        Result Review :                 Assessment and Plan    Diagnoses and all orders for this visit:    1. Essential hypertension (Primary)  Stable.  Continue losartan.  2. Chronic pain of left knee  New.  Recommend topical diclofenac as needed.  If not improving with home exercises would recommend formal physical therapy.  3. Need for vaccination  Recommend Shingrix, Pneumovax 23, senior high-dose flu vaccines.  Out of stock Pneumovax she could receive flu and Pneumovax this fall at nurse visit in clinic.  Shingrix vaccine through pharmacy.      Follow Up   Return in about 5 months (around 12/1/2021) for [employer] Wellness and fasting labs.  Patient was given instructions and counseling regarding her condition or for health maintenance advice. Please see specific information pulled into the AVS if appropriate.   Electronically signed by Vonda Alex MD, 07/13/21, 4:44 PM EDT.

## 2021-11-07 DIAGNOSIS — I10 ESSENTIAL HYPERTENSION: ICD-10-CM

## 2021-11-08 RX ORDER — LOSARTAN POTASSIUM 50 MG/1
50 TABLET ORAL DAILY
Qty: 90 TABLET | Refills: 1 | Status: SHIPPED | OUTPATIENT
Start: 2021-11-08 | End: 2022-04-05 | Stop reason: SDUPTHER

## 2021-11-08 NOTE — TELEPHONE ENCOUNTER
Rx Refill Note  Requested Prescriptions     Pending Prescriptions Disp Refills   • losartan (COZAAR) 50 MG tablet [Pharmacy Med Name: LOSARTAN 50MG TABLETS] 90 tablet 1     Sig: TAKE 1 TABLET BY MOUTH DAILY      Last office visit with prescribing clinician: 7/13/2021      Next office visit with prescribing clinician: 12/14/2021            Luis Miguel Rock MA  11/08/21, 08:38 EST     Last labs 12/8/2020

## 2021-12-14 ENCOUNTER — OFFICE VISIT (OUTPATIENT)
Dept: FAMILY MEDICINE CLINIC | Facility: CLINIC | Age: 66
End: 2021-12-14

## 2021-12-14 VITALS
HEART RATE: 85 BPM | OXYGEN SATURATION: 99 % | SYSTOLIC BLOOD PRESSURE: 120 MMHG | HEIGHT: 70 IN | WEIGHT: 173 LBS | BODY MASS INDEX: 24.77 KG/M2 | DIASTOLIC BLOOD PRESSURE: 62 MMHG

## 2021-12-14 DIAGNOSIS — F32.5 MAJOR DEPRESSION IN COMPLETE REMISSION (HCC): ICD-10-CM

## 2021-12-14 DIAGNOSIS — I10 ESSENTIAL HYPERTENSION: Chronic | ICD-10-CM

## 2021-12-14 DIAGNOSIS — J30.89 NON-SEASONAL ALLERGIC RHINITIS, UNSPECIFIED TRIGGER: ICD-10-CM

## 2021-12-14 DIAGNOSIS — Z13.1 SCREENING FOR DIABETES MELLITUS: ICD-10-CM

## 2021-12-14 DIAGNOSIS — E55.9 VITAMIN D DEFICIENCY: ICD-10-CM

## 2021-12-14 DIAGNOSIS — N30.10 CHRONIC INTERSTITIAL CYSTITIS: ICD-10-CM

## 2021-12-14 DIAGNOSIS — Z13.0 SCREENING FOR DEFICIENCY ANEMIA: ICD-10-CM

## 2021-12-14 DIAGNOSIS — Z91.81 AT MODERATE RISK FOR FALL: ICD-10-CM

## 2021-12-14 DIAGNOSIS — Z00.00 MEDICARE WELCOME EXAM: Primary | ICD-10-CM

## 2021-12-14 DIAGNOSIS — Z23 IMMUNIZATION DUE: ICD-10-CM

## 2021-12-14 LAB
BILIRUB BLD-MCNC: NEGATIVE MG/DL
CLARITY, POC: CLEAR
COLOR UR: YELLOW
EXPIRATION DATE: ABNORMAL
GLUCOSE UR STRIP-MCNC: NEGATIVE MG/DL
KETONES UR QL: NEGATIVE
LEUKOCYTE EST, POC: NEGATIVE
Lab: ABNORMAL
NITRITE UR-MCNC: NEGATIVE MG/ML
PH UR: 6 [PH] (ref 5–8)
PROT UR STRIP-MCNC: NEGATIVE MG/DL
RBC # UR STRIP: ABNORMAL /UL
SP GR UR: 1.03 (ref 1–1.03)
UROBILINOGEN UR QL: NORMAL

## 2021-12-14 PROCEDURE — 1170F FXNL STATUS ASSESSED: CPT | Performed by: FAMILY MEDICINE

## 2021-12-14 PROCEDURE — 81003 URINALYSIS AUTO W/O SCOPE: CPT | Performed by: FAMILY MEDICINE

## 2021-12-14 PROCEDURE — 90732 PPSV23 VACC 2 YRS+ SUBQ/IM: CPT | Performed by: FAMILY MEDICINE

## 2021-12-14 PROCEDURE — 99214 OFFICE O/P EST MOD 30 MIN: CPT | Performed by: FAMILY MEDICINE

## 2021-12-14 PROCEDURE — 96160 PT-FOCUSED HLTH RISK ASSMT: CPT | Performed by: FAMILY MEDICINE

## 2021-12-14 PROCEDURE — G0009 ADMIN PNEUMOCOCCAL VACCINE: HCPCS | Performed by: FAMILY MEDICINE

## 2021-12-14 PROCEDURE — 1160F RVW MEDS BY RX/DR IN RCRD: CPT | Performed by: FAMILY MEDICINE

## 2021-12-14 PROCEDURE — G0402 INITIAL PREVENTIVE EXAM: HCPCS | Performed by: FAMILY MEDICINE

## 2021-12-14 PROCEDURE — 1126F AMNT PAIN NOTED NONE PRSNT: CPT | Performed by: FAMILY MEDICINE

## 2021-12-14 RX ORDER — MONTELUKAST SODIUM 10 MG/1
10 TABLET ORAL NIGHTLY
Qty: 90 TABLET | Refills: 3 | Status: SHIPPED | OUTPATIENT
Start: 2021-12-14 | End: 2022-05-12

## 2021-12-14 RX ORDER — DIPHENOXYLATE HYDROCHLORIDE AND ATROPINE SULFATE 2.5; .025 MG/1; MG/1
TABLET ORAL DAILY
COMMUNITY

## 2021-12-14 RX ORDER — ESCITALOPRAM OXALATE 10 MG/1
10 TABLET ORAL DAILY
Qty: 90 TABLET | Refills: 3 | Status: SHIPPED | OUTPATIENT
Start: 2021-12-14 | End: 2022-05-23 | Stop reason: SDUPTHER

## 2021-12-14 NOTE — PROGRESS NOTES
The ABCs of the Annual Wellness Visit  Welcome to Medicare Visit    Chief Complaint   Patient presents with   • Welcome To Medicare     Pt states she is not fasting for labs. Pt would like Pnemuo vax today.    • Annual Exam     Subjective {   History of Present Illness:  Keshav Gu is a 66 y.o. female who presents for a  Welcome to Medicare Visit.    Walking outside with dog 30 minutes at least.     She has been having sinus pressure and sinus headaches. Post-nasal drainage. Using OTC antihistamines with zyrtec and nasonex. Allergy testing over 30 years ago: dust.     She was diagnosed with interstitial cystitis, flared up in last couple months. Tingling feeling in pelvic area. Doesn't feel like UTI. Nocturia x1 with noticing lower back but not pain. She cut back on caffeine in the past. Tries to drink water.     Knee pain is resolved. Taking care of 14 mo grandson, picking him up with left arm had tingling numbness in index finger and thumb. She has h/o broken left arm. Now symptoms have increased. She might be a little weak in left hand. Right hand dominant. Right wrist hurts after holding.     Tired in the afternoons.     The following portions of the patient's history were reviewed and   updated as appropriate: allergies, current medications, past family history, past medical history, past social history, past surgical history and problem list.     Compared to one year ago, the patient feels her physical   health is the same.    Compared to one year ago, the patient feels her mental   health is the same.    Recent Hospitalizations:  She was not admitted to the hospital during the last year.       Current Medical Providers:  Patient Care Team:  Vonda Garcia MD as PCP - General (Family Medicine)  Priya Kramer MD as Consulting Physician (Dermatology)  Juana Adams MD as Consulting Physician (Obstetrics and Gynecology)  Edwin Jewell Jr., MD as Consulting Physician  "(Urology)  Gabriel Bae MD as Consulting Physician (Gastroenterology)    Outpatient Medications Prior to Visit   Medication Sig Dispense Refill   • fluticasone (Flonase) 50 MCG/ACT nasal spray 2 sprays into the nostril(s) as directed by provider Daily. 1 bottle 11   • losartan (COZAAR) 50 MG tablet TAKE 1 TABLET BY MOUTH DAILY 90 tablet 1   • multivitamin (MULTI-VITAMIN PO) Take  by mouth Daily.     • VITAMIN D PO Take 2 each by mouth.     • escitalopram (LEXAPRO) 10 MG tablet TAKE 1 TABLET BY MOUTH DAILY 90 tablet 3     No facility-administered medications prior to visit.       No opioid medication identified on active medication list. I have reviewed chart for other potential  high risk medication/s and harmful drug interactions in the elderly.          Aspirin is not on active medication list.  Aspirin use is not indicated based on review of current medical condition/s. Risk of harm outweighs potential benefits.  .    Patient Active Problem List   Diagnosis   • Foot mass, left   • Essential hypertension   • Non-seasonal allergic rhinitis   • Major depression in complete remission (HCC)   • Chronic interstitial cystitis     Advance Care Planning  Advance Directive is not on file.  ACP discussion was held with the patient during this visit. Patient does not have an advance directive, information provided.           Objective      Vitals:    12/14/21 0903   BP: 120/62   Pulse: 85   SpO2: 99%   Weight: 78.5 kg (173 lb)   Height: 177.8 cm (70\")   PainSc: 0-No pain     BMI Readings from Last 1 Encounters:   12/14/21 24.82 kg/m²   BMI is within normal parameters. No follow-up required.    Does the patient have evidence of cognitive impairment? No    Physical Exam  Constitutional:       General: She is not in acute distress.  HENT:      Right Ear: Tympanic membrane and ear canal normal.      Left Ear: Tympanic membrane and ear canal normal.      Mouth/Throat:      Mouth: No oral lesions.      Tongue: No " lesions.      Pharynx: Oropharynx is clear. Uvula midline. No oropharyngeal exudate or posterior oropharyngeal erythema.   Eyes:      General:         Right eye: No discharge.         Left eye: No discharge.      Conjunctiva/sclera: Conjunctivae normal.   Neck:      Thyroid: No thyromegaly.   Cardiovascular:      Rate and Rhythm: Normal rate and regular rhythm.   Pulmonary:      Effort: Pulmonary effort is normal.      Breath sounds: Normal breath sounds.   Abdominal:      Palpations: Abdomen is soft.      Tenderness: There is no abdominal tenderness.   Musculoskeletal:      Cervical back: Neck supple.   Lymphadenopathy:      Head:      Right side of head: No submandibular, preauricular or posterior auricular adenopathy.      Left side of head: No submandibular, preauricular or posterior auricular adenopathy.      Cervical: No cervical adenopathy.   Skin:     General: Skin is warm and dry.   Neurological:      Mental Status: She is alert and oriented to person, place, and time.   Psychiatric:         Mood and Affect: Mood normal.         Behavior: Behavior normal.         Thought Content: Thought content normal.         Judgment: Judgment normal.         Procedures    Holter monitor - 48 hour (03/04/2021 09:23) normal           HEALTH RISK ASSESSMENT    Smoking Status:  Social History     Tobacco Use   Smoking Status Never Smoker   Smokeless Tobacco Never Used     Alcohol Consumption:  Social History     Substance and Sexual Activity   Alcohol Use Yes   • Alcohol/week: 1.0 standard drink   • Types: 1 Glasses of wine per week       Fall Risk Screen:    STEADI Fall Risk Assessment was completed, and patient is at LOW risk for falls.Assessment completed on:12/14/2021  STEADI Fall Risk Clinician Key Questions   Have you fallen in the past year?: Yes    Stay Idependant Patient Questions   Patient Fall Risk Assessment Score : 0      Depression Screen:   PHQ-2/PHQ-9 Depression Screening 12/14/2021   Little interest or  pleasure in doing things 0   Feeling down, depressed, or hopeless 0   Trouble falling or staying asleep, or sleeping too much -   Feeling tired or having little energy -   Poor appetite or overeating -   Feeling bad about yourself - or that you are a failure or have let yourself or your family down -   Trouble concentrating on things, such as reading the newspaper or watching television -   Moving or speaking so slowly that other people could have noticed. Or the opposite - being so fidgety or restless that you have been moving around a lot more than usual -   Thoughts that you would be better off dead, or of hurting yourself in some way -   Total Score 0   If you checked off any problems, how difficult have these problems made it for you to do your work, take care of things at home, or get along with other people? -   Little interest or pleasure in doing things -   Feeling down, depressed, or hopeless -   Trouble falling or staying asleep, or sleeping too much -   Feeling tired or having little energy -   Poor appetite or overeating -   Feeling bad about yourself - or that you are a failure or have let yourself or your family down -   Trouble concentrating on things, such as reading the newspaper or watching television -   Moving or speaking so slowly that other people could have noticed. Or the opposite - being so fidgety or restless that you have been moving around a lot more than usual -   Thoughts that you would be better off dead, or of hurting yourself in some way -   How difficult have these problems made it for you to do your work, take care of things at home, or get along with other people? -       Health Habits and Functional and Cognitive Screening:  Functional & Cognitive Status 12/14/2021   Do you have difficulty preparing food and eating? No   Do you have difficulty bathing yourself, getting dressed or grooming yourself? No   Do you have difficulty using the toilet? No   Do you have difficulty moving  around from place to place? No   Do you have trouble with steps or getting out of a bed or a chair? No   Current Diet Well Balanced Diet   Dental Exam Up to date   Eye Exam Not up to date   Exercise (times per week) 7 times per week   Current Exercises Include Walking   Do you need help using the phone?  No   Are you deaf or do you have serious difficulty hearing?  No   Do you need help with transportation? No   Do you need help shopping? No   Do you need help preparing meals?  No   Do you need help with housework?  No   Do you need help with laundry? No   Do you need help taking your medications? No   Do you need help managing money? No   Do you ever drive or ride in a car without wearing a seat belt? No   Have you felt unusual stress, anger or loneliness in the last month? No   Who do you live with? Alone   If you need help, do you have trouble finding someone available to you? No   Have you been bothered in the last four weeks by sexual problems? No   Do you have difficulty concentrating, remembering or making decisions? No       Visual Acuity:    No exam data present    Age-appropriate Screening Schedule:  Refer to the list below for future screening recommendations based on patient's age, sex and/or medical conditions. Orders for these recommended tests are listed in the plan section. The patient has been provided with a written plan.    Health Maintenance   Topic Date Due   • ZOSTER VACCINE (1 of 2) Never done   • LIPID PANEL  12/02/2021   • MAMMOGRAM  06/12/2022   • PAP SMEAR  03/06/2023   • DXA SCAN  03/19/2023   • TDAP/TD VACCINES (3 - Td or Tdap) 09/17/2030   • INFLUENZA VACCINE  Completed        Immunization History   Administered Date(s) Administered   • COVID-19 (PFIZER) 02/13/2021, 03/06/2021, 10/04/2021   • Flu Vaccine Quad PF >18YRS 11/20/2015, 11/15/2016, 10/17/2017   • FluLaval/Fluarix/Fluzone >6 10/22/2019, 09/17/2020   • Fluad Quad 65+ 10/24/2021   • Hepatitis A 10/16/2018, 04/16/2019   •  Pneumococcal Polysaccharide (PPSV23) 12/14/2021   • Tdap 06/08/2009, 09/17/2020     Results for orders placed or performed in visit on 12/14/21   POC Urinalysis Dipstick, Automated    Specimen: Urine   Result Value Ref Range    Color Yellow Yellow, Straw, Dark Yellow, Lidia    Clarity, UA Clear Clear    Specific Gravity  1.030 1.005 - 1.030    pH, Urine 6.0 5.0 - 8.0    Leukocytes Negative Negative    Nitrite, UA Negative Negative    Protein, POC Negative Negative mg/dL    Glucose, UA Negative Negative, 1000 mg/dL (3+) mg/dL    Ketones, UA Negative Negative    Urobilinogen, UA Normal Normal    Bilirubin Negative Negative    Blood, UA Trace (A) Negative    Lot Number [id number]     Expiration Date 7/25/2025          Assessment/Plan   CMS Preventative Services Quick Reference  Risk Factors Identified During Encounter  Fall Risk-High or Moderate  Immunizations Discussed/Encouraged (specific Immunizations; Pneumococcal 23  The above risks/problems have been discussed with the patient.  Pertinent information has been shared with the patient in the After Visit Summary.  Follow up plans and orders are seen below in the Assessment/Plan Section.    Diagnoses and all orders for this visit:    1. Medicare welcome exam (Primary)  Return when fasting for labs.   Breast cancer screening up to date.  Colon cancer screening up to date.   Osteoporosis screening up to date.   Counseled on health maintenance topics and preventative care recommendations: supplements, exercise    2. At moderate risk for fall    3. Immunization due  -     Pneumococcal Polysaccharide Vaccine 23-Valent Greater Than or Equal To 3yo Subcutaneous / IM    4. Essential hypertension  -     Comprehensive Metabolic Panel; Future  -     TSH Rfx On Abnormal To Free T4; Future  -     Lipid Panel; Future  Stable. Continue losartan. Recheck in 6 months.   5. Screening for diabetes mellitus  -     Comprehensive Metabolic Panel; Future    6. Screening for deficiency  anemia  -     CBC (No Diff); Future    7. Major depression in complete remission (HCC)  -     escitalopram (LEXAPRO) 10 MG tablet; Take 1 tablet by mouth Daily.  Dispense: 90 tablet; Refill: 3  Stable. Continue lexapro.   8. Vitamin D deficiency  -     Vitamin D 25 Hydroxy; Future    9. Non-seasonal allergic rhinitis, unspecified trigger  -     montelukast (Singulair) 10 MG tablet; Take 1 tablet by mouth Every Night.  Dispense: 90 tablet; Refill: 3  Uncontrolled. Start montelukast, cautioned on side effects. Continue antihistamine and nasal steroid spray.  10. Chronic interstitial cystitis  -     POC Urinalysis Dipstick, Automated  No signs on infection.  Mild blood.  Urine concentrated.  Avoid dietary triggers. Increase water intake.  Plan to recheck urinalysis and add microscopic when she returns for labs.      Follow Up:   Return in about 6 months (around 6/14/2022) for Office visit HTN , Initial MWV and fasting labs 1 year.     An After Visit Summary and PPPS were made available to the patient.               Electronically signed by Vonda Alex MD, 12/14/21, 9:36 AM EST.

## 2021-12-14 NOTE — PATIENT INSTRUCTIONS
Please fast for at least 8 hours prior to having labs collected.  Okay to drink water, black coffee, or black tea.  You may take medications and supplements.       Advance Care Planning and Advance Directives     You make decisions on a daily basis - decisions about where you want to live, your career, your home, your life. Perhaps one of the most important decisions you face is your choice for future medical care. Take time to talk with your family and your healthcare team and start planning today.  Advance Care Planning is a process that can help you:  · Understand possible future healthcare decisions in light of your own experiences  · Reflect on those decision in light of your goals and values  · Discuss your decisions with those closest to you and the healthcare professionals that care for you  · Make a plan by creating a document that reflects your wishes    Surrogate Decision Maker  In the event of a medical emergency, which has left you unable to communicate or to make your own decisions, you would need someone to make decisions for you.  It is important to discuss your preferences for medical treatment with this person while you are in good health.     Qualities of a surrogate decision maker:  • Willing to take on this role and responsibility  • Knows what you want for future medical care  • Willing to follow your wishes even if they don't agree with them  • Able to make difficult medical decisions under stressful circumstances    Advance Directives  These are legal documents you can create that will guide your healthcare team and decision maker(s) when needed. These documents can be stored in the electronic medical record.    · Living Will - a legal document to guide your care if you have a terminal condition or a serious illness and are unable to communicate. States vary by statute in document names/types, but most forms may include one or more of the following:        -  Directions regarding  life-prolonging treatments        -  Directions regarding artificially provided nutrition/hydration        -  Choosing a healthcare decision maker        -  Direction regarding organ/tissue donation    · Durable Power of  for Healthcare - this document names an -in-fact to make medical decisions for you, but it may also allow this person to make personal and financial decisions for you. Please seek the advice of an  if you need this type of document.    **Advance Directives are not required and no one may discriminate against you if you do not sign one.    Medical Orders  Many states allow specific forms/orders signed by your physician to record your wishes for medical treatment in your current state of health. This form, signed in personal communication with your physician, addresses resuscitation and other medical interventions that you may or may not want.      For more information or to schedule a time with a [facility] Advance Care Planning Facilitator contact: [facility] or call [phone] and someone will contact you directly.    Fall Prevention in the Home, Adult  Falls can cause injuries and can affect people from all age groups. There are many simple things that you can do to make your home safe and to help prevent falls. Ask for help when making these changes, if needed.  What actions can I take to prevent falls?  General instructions  · Use good lighting in all rooms. Replace any light bulbs that burn out.  · Turn on lights if it is dark. Use night-lights.  · Place frequently used items in easy-to-reach places. Lower the shelves around your home if necessary.  · Set up furniture so that there are clear paths around it. Avoid moving your furniture around.  · Remove throw rugs and other tripping hazards from the floor.  · Avoid walking on wet floors.  · Fix any uneven floor surfaces.  · Add color or contrast paint or tape to grab bars and handrails in your home.  Place contrasting color strips on the first and last steps of stairways.  · When you use a stepladder, make sure that it is completely opened and that the sides are firmly locked. Have someone hold the ladder while you are using it. Do not climb a closed stepladder.  · Be aware of any and all pets.  What can I do in the bathroom?         · Keep the floor dry. Immediately clean up any water that spills onto the floor.  · Remove soap buildup in the tub or shower on a regular basis.  · Use non-skid mats or decals on the floor of the tub or shower.  · Attach bath mats securely with double-sided, non-slip rug tape.  · If you need to sit down while you are in the shower, use a plastic, non-slip stool.  · Install grab bars by the toilet and in the tub and shower. Do not use towel bars as grab bars.  What can I do in the bedroom?  · Make sure that a bedside light is easy to reach.  · Do not use oversized bedding that drapes onto the floor.  · Have a firm chair that has side arms to use for getting dressed.  What can I do in the kitchen?  · Clean up any spills right away.  · If you need to reach for something above you, use a sturdy step stool that has a grab bar.  · Keep electrical cables out of the way.  · Do not use floor polish or wax that makes floors slippery. If you must use wax, make sure that it is non-skid floor wax.  What can I do in the stairways?  · Do not leave any items on the stairs.  · Make sure that you have a light switch at the top of the stairs and the bottom of the stairs. Have them installed if you do not have them.  · Make sure that there are handrails on both sides of the stairs. Fix handrails that are broken or loose. Make sure that handrails are as long as the stairways.  · Install non-slip stair treads on all stairs in your home.  · Avoid having throw rugs at the top or bottom of stairways, or secure the rugs with carpet tape to prevent them from moving.  · Choose a carpet design that does not  hide the edge of steps on the stairway.  · Check any carpeting to make sure that it is firmly attached to the stairs. Fix any carpet that is loose or worn.  What can I do on the outside of my home?  · Use bright outdoor lighting.  · Regularly repair the edges of walkways and driveways and fix any cracks.  · Remove high doorway thresholds.  · Trim any shrubbery on the main path into your home.  · Regularly check that handrails are securely fastened and in good repair. Both sides of any steps should have handrails.  · Install guardrails along the edges of any raised decks or porches.  · Clear walkways of debris and clutter, including tools and rocks.  · Have leaves, snow, and ice cleared regularly.  · Use sand or salt on walkways during winter months.  · In the garage, clean up any spills right away, including grease or oil spills.  What other actions can I take?  · Wear closed-toe shoes that fit well and support your feet. Wear shoes that have rubber soles or low heels.  · Use mobility aids as needed, such as canes, walkers, scooters, and crutches.  · Review your medicines with your health care provider. Some medicines can cause dizziness or changes in blood pressure, which increase your risk of falling.  Talk with your health care provider about other ways that you can decrease your risk of falls. This may include working with a physical therapist or  to improve your strength, balance, and endurance.  Where to find more information  · Centers for Disease Control and Prevention, [place]: https://www.cdc.gov  · National [place] on Aging: https://kl2cuea.nina.nih.gov  Contact a health care provider if:  · You are afraid of falling at home.  · You feel weak, drowsy, or dizzy at home.  · You fall at home.  Summary  · There are many simple things that you can do to make your home safe and to help prevent falls.  · Ways to make your home safe include removing tripping hazards and installing grab bars in the  bathroom.  · Ask for help when making these changes in your home.  This information is not intended to replace advice given to you by your health care provider. Make sure you discuss any questions you have with your health care provider.  Document Revised: 11/30/2018 Document Reviewed: 08/02/2018  Elsevier Patient Education © 2021 [employer]      Sit-to-Stand Exercise    The sit-to-stand exercise (also known as the chair stand or chair rise exercise) strengthens your lower body and helps you maintain or improve your mobility and independence. The goal is to do the sit-to-stand exercise without using your hands. This will be easier as you become stronger. You should always talk with your health care provider before starting any exercise program, especially if you have had recent surgery.  Do the exercise exactly as told by your health care provider and adjust it as directed. It is normal to feel mild stretching, pulling, tightness, or discomfort as you do this exercise, but you should stop right away if you feel sudden pain or your pain gets worse. Do not begin doing this exercise until told by your health care provider.  What the sit-to-stand exercise does  The sit-to-stand exercise helps to strengthen the muscles in your thighs and the muscles in the center of your body that give you stability (core muscles). This exercise is especially helpful if:  · You have had knee or hip surgery.  · You have trouble getting up from a chair, out of a car, or off the toilet.  How to do the sit-to-stand exercise  1. Sit toward the front edge of a sturdy chair without armrests. Your knees should be bent and your feet should be flat on the floor and shoulder-width apart.  2. Place your hands lightly on each side of the seat. Keep your back and neck as straight as possible, with your chest slightly forward.  3. Breathe in slowly. Lean forward and slightly shift your weight to the front of your feet.  4. Breathe out as you slowly  stand up. Use your hands as little as possible.  5. Stand and pause for a full breath in and out.  6. Breathe in as you sit down slowly. Tighten your core and abdominal muscles to control your lowering as much as possible.  7. Breathe out slowly.  8. Do this exercise 10-15 times. If needed, do it fewer times until you build up strength.  9. Rest for 1 minute, then do another set of 10-15 repetitions.  To change the difficulty of the sit-to-stand exercise  · If the exercise is too difficult, use a chair with sturdy armrests, and push off the armrests to help you come to the standing position. You can also use the armrests to help slowly lower yourself back to sitting. As this gets easier, try to use your arms less. You can also place a firm cushion or pillow on the chair to make the surface higher.  · If this exercise is too easy, do not use your arms to help raise or lower yourself. You can also wear a weighted vest, use hand weights, increase your repetitions, or try a lower chair.  General tips  · You may feel tired when starting an exercise routine. This is normal.  · You may have muscle soreness that lasts a few days. This is normal. As you get stronger, you may not feel muscle soreness.  · Use smooth, steady movements.  · Do not  hold your breath during strength exercises. This can cause unsafe changes in your blood pressure.  · Breathe in slowly through your nose, and breathe out slowly through your mouth.  Summary  · Strengthening your lower body is an important step to help you move safely and independently.  · The sit-to-stand exercise helps strengthen the muscles in your thighs and core.  · You should always talk with your health care provider before starting any exercise program, especially if you have had recent surgery.  This information is not intended to replace advice given to you by your health care provider. Make sure you discuss any questions you have with your health care provider.  Document  Revised: 10/16/2019 Document Reviewed: 2018  Elsevier Patient Education ©  [employer]    You are due for Shingrix vaccination series ( the newest shingles vaccine).  It is a two shot series spaced 2-6 months apart. Please get this vaccine series started at your earliest convenience at your local pharmacy to help avoid shingles outbreak. It is more effective than the old Zostavax vaccine and is recommended even if you have had the Zostavax vaccine in the past.  Once the Shingrix series is completed, it does not need to be repeated.   For more information, please look at the website below:  [facility] Shingrix Vaccine Information      Medicare Wellness  Personal Prevention Plan of Service     Date of Office Visit:    Encounter Provider:  Vonda Alex MD  Place of Service:  [facility]  Patient Name: Keshav Gu  :  1955    As part of the Medicare Wellness portion of your visit today, we are providing you with this personalized preventive plan of services (PPPS). This plan is based upon recommendations of the [place] Preventive Services Task Force (USPSTF) and the Advisory Committee on Immunization Practices (ACIP).    This lists the preventive care services that should be considered, and provides dates of when you are due. Items listed as completed are up-to-date and do not require any further intervention.    Health Maintenance   Topic Date Due   • ZOSTER VACCINE (1 of 2) Never done   • Pneumococcal Vaccine 65+ (1 of 1 - PPSV23) Never done   • LIPID PANEL  2021   • MAMMOGRAM  2022   • ANNUAL WELLNESS VISIT  2022   • PAP SMEAR  2023   • DXA SCAN  2023   • COLORECTAL CANCER SCREENING  2026   • TDAP/TD VACCINES (3 - Td or Tdap) 2030   • HEPATITIS C SCREENING  Completed   • COVID-19 Vaccine  Completed   • INFLUENZA VACCINE  Completed       No orders of the defined types were placed in this  encounter.      Return in about 6 months (around 6/14/2022) for Office visit HTN , Initial MWV and fasting labs 1 year.

## 2021-12-30 ENCOUNTER — LAB (OUTPATIENT)
Dept: LAB | Facility: HOSPITAL | Age: 66
End: 2021-12-30

## 2021-12-30 DIAGNOSIS — Z13.0 SCREENING FOR DEFICIENCY ANEMIA: ICD-10-CM

## 2021-12-30 DIAGNOSIS — E55.9 VITAMIN D DEFICIENCY: ICD-10-CM

## 2021-12-30 DIAGNOSIS — I10 ESSENTIAL HYPERTENSION: Chronic | ICD-10-CM

## 2021-12-30 LAB
25(OH)D3 SERPL-MCNC: 70.1 NG/ML
CHOLEST SERPL-MCNC: 194 MG/DL (ref 0–200)
DEPRECATED RDW RBC AUTO: 38.7 FL (ref 37–54)
ERYTHROCYTE [DISTWIDTH] IN BLOOD BY AUTOMATED COUNT: 12.1 % (ref 12.3–15.4)
HCT VFR BLD AUTO: 40.6 % (ref 34–46.6)
HDLC SERPL-MCNC: 60 MG/DL (ref 40–60)
HGB BLD-MCNC: 13 G/DL (ref 12–15.9)
LDLC SERPL CALC-MCNC: 120 MG/DL (ref 0–100)
LDLC/HDLC SERPL: 1.98 {RATIO}
MCH RBC QN AUTO: 28 PG (ref 26.6–33)
MCHC RBC AUTO-ENTMCNC: 32 G/DL (ref 31.5–35.7)
MCV RBC AUTO: 87.5 FL (ref 79–97)
PLATELET # BLD AUTO: 299 10*3/MM3 (ref 140–450)
PMV BLD AUTO: 10.8 FL (ref 6–12)
RBC # BLD AUTO: 4.64 10*6/MM3 (ref 3.77–5.28)
TRIGL SERPL-MCNC: 77 MG/DL (ref 0–150)
TSH SERPL DL<=0.05 MIU/L-ACNC: 3.31 UIU/ML (ref 0.27–4.2)
VLDLC SERPL-MCNC: 14 MG/DL (ref 5–40)
WBC NRBC COR # BLD: 4.97 10*3/MM3 (ref 3.4–10.8)

## 2021-12-30 PROCEDURE — 82306 VITAMIN D 25 HYDROXY: CPT

## 2021-12-30 PROCEDURE — 80053 COMPREHEN METABOLIC PANEL: CPT | Performed by: FAMILY MEDICINE

## 2021-12-30 PROCEDURE — 85027 COMPLETE CBC AUTOMATED: CPT

## 2021-12-30 PROCEDURE — 81001 URINALYSIS AUTO W/SCOPE: CPT | Performed by: FAMILY MEDICINE

## 2021-12-30 PROCEDURE — 80061 LIPID PANEL: CPT

## 2021-12-30 PROCEDURE — 84443 ASSAY THYROID STIM HORMONE: CPT

## 2022-01-03 ENCOUNTER — PATIENT MESSAGE (OUTPATIENT)
Dept: FAMILY MEDICINE CLINIC | Facility: CLINIC | Age: 67
End: 2022-01-03

## 2022-01-21 PROCEDURE — 87077 CULTURE AEROBIC IDENTIFY: CPT | Performed by: FAMILY MEDICINE

## 2022-01-21 PROCEDURE — 87086 URINE CULTURE/COLONY COUNT: CPT | Performed by: FAMILY MEDICINE

## 2022-01-21 PROCEDURE — 87186 SC STD MICRODIL/AGAR DIL: CPT | Performed by: FAMILY MEDICINE

## 2022-01-23 ENCOUNTER — TELEPHONE (OUTPATIENT)
Dept: URGENT CARE | Facility: CLINIC | Age: 67
End: 2022-01-23

## 2022-01-23 NOTE — TELEPHONE ENCOUNTER
----- Message from Rolando Bradshaw MD sent at 1/23/2022  3:37 PM EST -----  Please inform patient of positive urine culture.  The antibiotic she is on should be effective

## 2022-04-05 DIAGNOSIS — I10 ESSENTIAL HYPERTENSION: ICD-10-CM

## 2022-04-05 RX ORDER — LOSARTAN POTASSIUM 50 MG/1
50 TABLET ORAL DAILY
Qty: 90 TABLET | Refills: 1 | Status: SHIPPED | OUTPATIENT
Start: 2022-04-05 | End: 2022-07-01 | Stop reason: SDUPTHER

## 2022-04-05 NOTE — TELEPHONE ENCOUNTER
Rx Refill Note  Requested Prescriptions     Pending Prescriptions Disp Refills   • losartan (COZAAR) 50 MG tablet 90 tablet 1     Sig: Take 1 tablet by mouth Daily.      Last office visit with prescribing clinician: 12/14/2021      Next office visit with prescribing clinician: 6/14/2022            Brissa Rodriguez MA  04/05/22, 08:37 EDT

## 2022-04-05 NOTE — TELEPHONE ENCOUNTER
Caller: Keshav Gu    Relationship: Self    Best call back number: [phone]    Requested Prescriptions:   Requested Prescriptions     Pending Prescriptions Disp Refills   • losartan (COZAAR) 50 MG tablet 90 tablet 1     Sig: Take 1 tablet by mouth Daily.        [facility] [address]  [facility] [phone] [facility] [phone]      Additional details provided by patient: SHE IS OUT. THE PHARMACY WAS SUPPOSED TO FILL    Does the patient have less than a 3 day supply:  [x] Yes  [] No    Bakari Hernandez, PCT   04/05/22 08:35 EDT

## 2022-05-06 ENCOUNTER — PATIENT MESSAGE (OUTPATIENT)
Dept: FAMILY MEDICINE CLINIC | Facility: CLINIC | Age: 67
End: 2022-05-06

## 2022-05-12 ENCOUNTER — TELEMEDICINE (OUTPATIENT)
Dept: FAMILY MEDICINE CLINIC | Facility: CLINIC | Age: 67
End: 2022-05-12

## 2022-05-12 DIAGNOSIS — U07.1 COVID-19: Primary | ICD-10-CM

## 2022-05-12 PROCEDURE — 99213 OFFICE O/P EST LOW 20 MIN: CPT | Performed by: NURSE PRACTITIONER

## 2022-05-12 NOTE — PROGRESS NOTES
Chief Complaint   Patient presents with   • covid       Subjective   Keshav Gu is a 67 y.o. female    History of Present Illness  Patient on video visit today for a positive COVID test.  She states that she was tested yesterday after having symptoms of runny nose, slight sore throat, slight body aches feeling feverish and chilling.  He did test positive for COVID.  She did get permission to be on video visit and is in [facility] per her report.    Allergies   Allergen Reactions   • Macrobid [Nitrofurantoin Macrocrystal] Itching and Swelling     Past Medical History:   Diagnosis Date   • Allergic 40 yrs ago    dust   • Basal cell carcinoma     right cheek, left chest, left arm   • Broken arm     left   • Colon polyp 12/01/2014   • Depression    • Elevated hemoglobin A1c 01/17/2017    5.7   • Frequent UTI     related to sexual activity   • Hyperlipidemia    • Hypertension 01/17/2017   • Interstitial cystitis    • Migraine headache 01/31/2014   • Mononucleosis    • Osteopenia    • Plantar fasciitis 06/08/2009   • Scoliosis of thoracic spine 2018    xrays   • T8 vertebral fracture (HCC) 2010   • Vitamin D deficiency 11/25/2015      Past Surgical History:   Procedure Laterality Date   • COLONOSCOPY  2021   • SKIN CANCER EXCISION       Social History     Socioeconomic History   • Marital status:    Tobacco Use   • Smoking status: Never Smoker   • Smokeless tobacco: Never Used   Vaping Use   • Vaping Use: Never used   Substance and Sexual Activity   • Alcohol use: Yes     Alcohol/week: 1.0 standard drink     Types: 1 Glasses of wine per week   • Drug use: No   • Sexual activity: Not Currently     Partners: Male     Birth control/protection: Abstinence        Current Outpatient Medications:   •  escitalopram (LEXAPRO) 10 MG tablet, Take 1 tablet by mouth Daily., Disp: 90 tablet, Rfl: 3  •  fluticasone (Flonase) 50 MCG/ACT nasal spray, 2 sprays into the nostril(s) as directed by provider Daily.,  Disp: 1 bottle, Rfl: 11  •  losartan (COZAAR) 50 MG tablet, Take 1 tablet by mouth Daily., Disp: 90 tablet, Rfl: 1  •  multivitamin (MULTI-VITAMIN PO), Take  by mouth Daily., Disp: , Rfl:   •  Nirmatrelvir & Ritonavir (PAXLOVID) 20 x 150 MG & 10 x 100MG tablet therapy pack tablet, Take 3 tablets by mouth 2 (Two) Times a Day for 5 days., Disp: 30 tablet, Rfl: 0  •  VITAMIN D PO, Take 2 each by mouth., Disp: , Rfl:      Review of Systems   Constitutional: Positive for chills, fatigue and fever. Negative for unexpected weight change.   HENT: Positive for rhinorrhea and sore throat.    Respiratory: Positive for cough. Negative for chest tightness, shortness of breath and wheezing.    Cardiovascular: Negative for chest pain, palpitations and leg swelling.   Gastrointestinal: Negative for constipation, diarrhea, nausea and vomiting.   Genitourinary: Negative for difficulty urinating and dysuria.   Musculoskeletal: Positive for myalgias.   Skin: Negative for color change and rash.   Neurological: Negative for dizziness, syncope, weakness and headaches.   Psychiatric/Behavioral: Negative for sleep disturbance.       Objective     There were no vitals filed for this visit.  There were no vitals filed for this visit.  There is no height or weight on file to calculate BMI.  Results for orders placed or performed during the hospital encounter of 01/21/22   Urine Culture - Urine, Urine, Clean Catch    Specimen: Urine, Clean Catch   Result Value Ref Range    Urine Culture >100,000 CFU/mL Proteus mirabilis (A)        Susceptibility    Proteus mirabilis - LITA     Ampicillin  Susceptible ug/ml     Ampicillin + Sulbactam  Susceptible ug/ml     Cefazolin  Susceptible ug/ml     Cefepime  Susceptible ug/ml     Ceftazidime  Susceptible ug/ml     Ceftriaxone  Susceptible ug/ml     Gentamicin  Susceptible ug/ml     Levofloxacin  Susceptible ug/ml     Nitrofurantoin  Resistant ug/ml     Piperacillin + Tazobactam  Susceptible ug/ml      Trimethoprim + Sulfamethoxazole  Susceptible ug/ml   POC Urinalysis Dipstick, Multipro (Automated dipstick)    Specimen: Urine   Result Value Ref Range    Color Red (A) Yellow, Straw, Dark Yellow, Lidia    Clarity, UA Slightly Cloudy (A) Clear    Glucose, UA Negative Negative, 1000 mg/dL (3+) mg/dL    Bilirubin Negative Negative    Ketones, UA Negative Negative    Specific Gravity  1.020 1.005 - 1.030    Blood, UA 3+ (A) Negative    pH, Urine 6.0 5.0 - 8.0    Protein, POC 3+ (A) Negative mg/dL    Urobilinogen, UA Normal Normal    Nitrite, UA Negative Negative    Leukocytes Large (3+) (A) Negative       Physical Exam  Constitutional:       General: She is not in acute distress.     Appearance: She is not toxic-appearing.   Eyes:      Conjunctiva/sclera: Conjunctivae normal.   Pulmonary:      Effort: No respiratory distress.   Neurological:      Mental Status: She is oriented to person, place, and time.         Assessment & Plan   Problems Addressed this Visit    None     Visit Diagnoses     COVID-19    -  Primary    Relevant Medications    Nirmatrelvir & Ritonavir (PAXLOVID) 20 x 150 MG & 10 x 100MG tablet therapy pack tablet      Diagnoses       Codes Comments    COVID-19    -  Primary ICD-10-CM: U07.1  ICD-9-CM: 079.89       Patient positive COVID-19 and requesting Paxlovid.  I did discuss with her that this is a experimental drug and is not FDA approved.  I did go through the guidelines with the medication with her as well as checking her medication list and EGFR.  She does meet the criteria for this medication. Patient was encouraged to keep me informed of any acute changes, lack of improvement, or any new concerning symptoms.  If patient becomes concerned, or has any worsening symptoms, they are to report either here, urgent treatment, or emergency room. Plan of care discussed with pt. They verbalized understanding and agreement.  I did discuss with her purchasing a pulse oximeter and if her pulse ox is less  than 90 to go to the ER.    Time spent on visit, including counseling, education, reviewing the chart, and any recent test results, was 15       Minutes    Return visit in as needed    Counseling provided on COVID.    James Peterson, APRN   5/12/2022   10:58 EDT     Please note that portions of this document were completed with a voice recognition program.

## 2022-05-23 DIAGNOSIS — F32.5 MAJOR DEPRESSION IN COMPLETE REMISSION: ICD-10-CM

## 2022-05-23 RX ORDER — ESCITALOPRAM OXALATE 10 MG/1
10 TABLET ORAL DAILY
Qty: 90 TABLET | Refills: 1 | Status: SHIPPED | OUTPATIENT
Start: 2022-05-23 | End: 2022-07-01 | Stop reason: SDUPTHER

## 2022-05-23 NOTE — TELEPHONE ENCOUNTER
Caller: Keshav Gu    Relationship: Self    Best call back number: [phone]    Requested Prescriptions:   Requested Prescriptions     Pending Prescriptions Disp Refills   • escitalopram (LEXAPRO) 10 MG tablet 90 tablet 3     Sig: Take 1 tablet by mouth Daily.        Pharmacy where request should be sent: [id number] - [facility] [address] AT [facility] [address] [facility] [phone] [facility] [phone] FX     Additional details provided by patient: COMPLETELY OUT OF MEDICATION    Does the patient have less than a 3 day supply:  [x] Yes  [] No    Ann Marie Solis Rep   05/23/22 15:07 EDT

## 2022-05-23 NOTE — TELEPHONE ENCOUNTER
Rx Refill Note  Requested Prescriptions     Pending Prescriptions Disp Refills   • escitalopram (LEXAPRO) 10 MG tablet 90 tablet 3     Sig: Take 1 tablet by mouth Daily.      Last office visit with prescribing clinician: 12/14/2021      Next office visit with prescribing clinician: 6/14/2022   Bridgett Nelson MA  05/23/22, 15:08 EDT     Last fill: 12/14/2021

## 2022-06-14 ENCOUNTER — OFFICE VISIT (OUTPATIENT)
Dept: FAMILY MEDICINE CLINIC | Facility: CLINIC | Age: 67
End: 2022-06-14

## 2022-06-14 VITALS
BODY MASS INDEX: 24.54 KG/M2 | SYSTOLIC BLOOD PRESSURE: 136 MMHG | OXYGEN SATURATION: 96 % | HEART RATE: 81 BPM | WEIGHT: 171.4 LBS | DIASTOLIC BLOOD PRESSURE: 72 MMHG | HEIGHT: 70 IN

## 2022-06-14 DIAGNOSIS — Z86.16 HISTORY OF 2019 NOVEL CORONAVIRUS DISEASE (COVID-19): ICD-10-CM

## 2022-06-14 DIAGNOSIS — Z12.31 VISIT FOR SCREENING MAMMOGRAM: ICD-10-CM
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
## 2022-06-14 DIAGNOSIS — L74.0 HEAT RASH: ICD-10-CM

## 2022-06-14 DIAGNOSIS — I10 ESSENTIAL HYPERTENSION: Primary | Chronic | ICD-10-CM

## 2022-06-14 DIAGNOSIS — S40.862A INSECT BITE OF LEFT UPPER ARM, INITIAL ENCOUNTER: ICD-10-CM

## 2022-06-14 PROCEDURE — 99214 OFFICE O/P EST MOD 30 MIN: CPT | Performed by: FAMILY MEDICINE

## 2022-06-14 NOTE — PROGRESS NOTES
"Chief Complaint  Hypertension (Follow-up)    Subjective        Keshav Gu presents to [facility]  Hypertension  This is a chronic problem. The problem is controlled. Current antihypertension treatment includes angiotensin blockers.     She had COVID 19 recently and was treated with Paxlovid. She had one bad day like the flu. No continued symptoms. She was tired for 1-2 weeks.     She has itch on her arms. Worse going outside in the heat. Sarna lotion helps Itch on left upper arm with red bump 6/12/22.       Objective   Vital Signs:  /72   Pulse 81   Ht 177.8 cm (70\")   Wt 77.7 kg (171 lb 6.4 oz)   SpO2 96%   BMI 24.59 kg/m²   Estimated body mass index is 24.59 kg/m² as calculated from the following:    Height as of this encounter: 177.8 cm (70\").    Weight as of this encounter: 77.7 kg (171 lb 6.4 oz).    BMI is within normal parameters. No other follow-up for BMI required.      Physical Exam  Vitals reviewed.   Constitutional:       General: She is not in acute distress.     Appearance: She is not ill-appearing.   Cardiovascular:      Rate and Rhythm: Normal rate and regular rhythm.   Pulmonary:      Effort: Pulmonary effort is normal.      Breath sounds: Normal breath sounds.   Musculoskeletal:      Comments: Left upper arm posteriorly 4 mm erythematous nodule with induration and central black dot.  No bleeding or drainage.    Flesh-colored papules in cluster bilateral AC fossa   Neurological:      Mental Status: She is alert.   Psychiatric:         Mood and Affect: Mood normal.        Result Review :                Assessment and Plan   Diagnoses and all orders for this visit:    1. Essential hypertension (Primary)  Continue losartan.  2. History of 2019 novel coronavirus disease (COVID-19)  Patient was treated with Paxlovid.  3. Heat rash  Recommend Benadryl as needed for itching.  4. Insect bite of left upper arm, initial encounter  Recommend Benadryl as " needed for itching.  No signs of infection needing antibiotics.  5. Visit for screening mammogram  She is due for annual mammogram.    Electronically signed by Vonda Alex MD, 06/14/22, 4:12 PM EDT.         Follow Up   Return in about 6 months (around 12/16/2022) for MWV and fasting labs.  Patient was given instructions and counseling regarding her condition or for health maintenance advice. Please see specific information pulled into the AVS if appropriate.

## 2022-06-20 ENCOUNTER — TRANSCRIBE ORDERS (OUTPATIENT)
Dept: ADMINISTRATIVE | Facility: HOSPITAL | Age: 67
End: 2022-06-20

## 2022-06-20 DIAGNOSIS — Z12.31 VISIT FOR SCREENING MAMMOGRAM: Primary | ICD-10-CM

## 2022-07-01 DIAGNOSIS — I10 ESSENTIAL HYPERTENSION: ICD-10-CM

## 2022-07-01 DIAGNOSIS — F32.5 MAJOR DEPRESSION IN COMPLETE REMISSION: ICD-10-CM

## 2022-07-05 RX ORDER — ESCITALOPRAM OXALATE 10 MG/1
10 TABLET ORAL DAILY
Qty: 90 TABLET | Refills: 1 | Status: SHIPPED | OUTPATIENT
Start: 2022-07-05 | End: 2022-12-22 | Stop reason: SDUPTHER

## 2022-07-05 RX ORDER — LOSARTAN POTASSIUM 50 MG/1
50 TABLET ORAL DAILY
Qty: 90 TABLET | Refills: 1 | Status: SHIPPED | OUTPATIENT
Start: 2022-07-05 | End: 2022-11-16 | Stop reason: SDUPTHER

## 2022-07-18 ENCOUNTER — HOSPITAL ENCOUNTER (OUTPATIENT)
Dept: MAMMOGRAPHY | Facility: HOSPITAL | Age: 67
Discharge: HOME | End: 2022-07-18
Admitting: FAMILY MEDICINE

## 2022-07-18 DIAGNOSIS — Z12.31 VISIT FOR SCREENING MAMMOGRAM: ICD-10-CM

## 2022-07-18 PROCEDURE — 77067 SCR MAMMO BI INCL CAD: CPT | Performed by: RADIOLOGY

## 2022-07-18 PROCEDURE — 77063 BREAST TOMOSYNTHESIS BI: CPT | Performed by: RADIOLOGY

## 2022-07-18 PROCEDURE — 77063 BREAST TOMOSYNTHESIS BI: CPT

## 2022-07-18 PROCEDURE — 77067 SCR MAMMO BI INCL CAD: CPT

## 2022-11-16 DIAGNOSIS — I10 ESSENTIAL HYPERTENSION: ICD-10-CM

## 2022-11-16 RX ORDER — LOSARTAN POTASSIUM 50 MG/1
50 TABLET ORAL DAILY
Qty: 90 TABLET | Refills: 1 | Status: SHIPPED | OUTPATIENT
Start: 2022-11-16

## 2022-11-16 NOTE — TELEPHONE ENCOUNTER
Rx Refill Note  Requested Prescriptions     Pending Prescriptions Disp Refills   • losartan (COZAAR) 50 MG tablet 90 tablet 1     Sig: Take 1 tablet by mouth Daily.      Last office visit with prescribing clinician: 6/14/2022      Next office visit with prescribing clinician: 12/22/2022            Myriam Blanca MA  11/16/22, 10:28 EST

## 2022-12-22 ENCOUNTER — OFFICE VISIT (OUTPATIENT)
Dept: FAMILY MEDICINE CLINIC | Facility: CLINIC | Age: 67
End: 2022-12-22

## 2022-12-22 ENCOUNTER — LAB (OUTPATIENT)
Dept: LAB | Facility: HOSPITAL | Age: 67
End: 2022-12-22

## 2022-12-22 VITALS
DIASTOLIC BLOOD PRESSURE: 80 MMHG | WEIGHT: 174.8 LBS | BODY MASS INDEX: 25.03 KG/M2 | SYSTOLIC BLOOD PRESSURE: 126 MMHG | OXYGEN SATURATION: 98 % | HEIGHT: 70 IN | HEART RATE: 80 BPM

## 2022-12-22 DIAGNOSIS — I10 ESSENTIAL HYPERTENSION: Chronic | ICD-10-CM

## 2022-12-22 DIAGNOSIS — Z00.00 MEDICARE ANNUAL WELLNESS VISIT, SUBSEQUENT: Primary | ICD-10-CM

## 2022-12-22 DIAGNOSIS — Z13.0 SCREENING FOR DEFICIENCY ANEMIA: ICD-10-CM

## 2022-12-22 DIAGNOSIS — Z13.1 SCREENING FOR DIABETES MELLITUS: ICD-10-CM

## 2022-12-22 DIAGNOSIS — Z23 IMMUNIZATION DUE: ICD-10-CM

## 2022-12-22 DIAGNOSIS — F32.5 MAJOR DEPRESSION IN COMPLETE REMISSION: ICD-10-CM

## 2022-12-22 LAB
ALBUMIN SERPL-MCNC: 4.4 G/DL (ref 3.5–5.2)
ALBUMIN/GLOB SERPL: 2 G/DL
ALP SERPL-CCNC: 53 U/L (ref 39–117)
ALT SERPL-CCNC: 17 U/L (ref 1–33)
AST SERPL-CCNC: 18 U/L (ref 1–32)
BILIRUB SERPL-MCNC: 0.4 MG/DL (ref 0–1.2)
BUN SERPL-MCNC: 22 MG/DL (ref 8–23)
BUN/CREAT SERPL: 29.3 (ref 7–25)
CALCIUM SERPL-MCNC: 9.4 MG/DL (ref 8.6–10.5)
CHLORIDE SERPL-SCNC: 106 MMOL/L (ref 98–107)
CHOLEST SERPL-MCNC: 229 MG/DL (ref 0–200)
CO2 SERPL-SCNC: 27 MMOL/L (ref 22–29)
CREAT SERPL-MCNC: 0.75 MG/DL (ref 0.57–1)
EGFRCR SERPLBLD CKD-EPI 2021: 87.9 ML/MIN/1.73
ERYTHROCYTE [DISTWIDTH] IN BLOOD BY AUTOMATED COUNT: 12.7 % (ref 12.3–15.4)
GLOBULIN SER CALC-MCNC: 2.2 GM/DL
GLUCOSE SERPL-MCNC: 94 MG/DL (ref 65–99)
HCT VFR BLD AUTO: 41.1 % (ref 34–46.6)
HDLC SERPL-MCNC: 81 MG/DL (ref 40–60)
HGB BLD-MCNC: 13.4 G/DL (ref 12–15.9)
LDLC SERPL CALC-MCNC: 140 MG/DL (ref 0–100)
MCH RBC QN AUTO: 28 PG (ref 26.6–33)
MCHC RBC AUTO-ENTMCNC: 32.6 G/DL (ref 31.5–35.7)
MCV RBC AUTO: 86 FL (ref 79–97)
PLATELET # BLD AUTO: 274 10*3/MM3 (ref 140–450)
POTASSIUM SERPL-SCNC: 4.3 MMOL/L (ref 3.5–5.2)
PROT SERPL-MCNC: 6.6 G/DL (ref 6–8.5)
RBC # BLD AUTO: 4.78 10*6/MM3 (ref 3.77–5.28)
SODIUM SERPL-SCNC: 144 MMOL/L (ref 136–145)
TRIGL SERPL-MCNC: 48 MG/DL (ref 0–150)
TSH SERPL DL<=0.005 MIU/L-ACNC: 2.78 UIU/ML (ref 0.27–4.2)
VLDLC SERPL CALC-MCNC: 8 MG/DL (ref 5–40)
WBC # BLD AUTO: 4.19 10*3/MM3 (ref 3.4–10.8)

## 2022-12-22 PROCEDURE — 99397 PER PM REEVAL EST PAT 65+ YR: CPT | Performed by: FAMILY MEDICINE

## 2022-12-22 PROCEDURE — 96160 PT-FOCUSED HLTH RISK ASSMT: CPT | Performed by: FAMILY MEDICINE

## 2022-12-22 PROCEDURE — 99213 OFFICE O/P EST LOW 20 MIN: CPT | Performed by: FAMILY MEDICINE

## 2022-12-22 PROCEDURE — G0009 ADMIN PNEUMOCOCCAL VACCINE: HCPCS | Performed by: FAMILY MEDICINE

## 2022-12-22 PROCEDURE — 90677 PCV20 VACCINE IM: CPT | Performed by: FAMILY MEDICINE

## 2022-12-22 PROCEDURE — 1160F RVW MEDS BY RX/DR IN RCRD: CPT | Performed by: FAMILY MEDICINE

## 2022-12-22 PROCEDURE — G0438 PPPS, INITIAL VISIT: HCPCS | Performed by: FAMILY MEDICINE

## 2022-12-22 PROCEDURE — 1170F FXNL STATUS ASSESSED: CPT | Performed by: FAMILY MEDICINE

## 2022-12-22 RX ORDER — ESCITALOPRAM OXALATE 10 MG/1
10 TABLET ORAL DAILY
Qty: 90 TABLET | Refills: 3 | Status: SHIPPED | OUTPATIENT
Start: 2022-12-22 | End: 2023-03-20 | Stop reason: DRUGHIGH

## 2022-12-22 NOTE — PROGRESS NOTES
The ABCs of the Annual Wellness Visit  Subsequent Medicare Wellness Visit    Subjective    Keshav Gu is a 67 y.o. female who presents for a Subsequent Medicare Wellness Visit.    The following portions of the patient's history were reviewed and   updated as appropriate: allergies, current medications, past family history, past medical history, past social history, past surgical history and problem list.    Compared to one year ago, the patient feels her physical   health is the same.    Compared to one year ago, the patient feels her mental   health is the same.    Recent Hospitalizations:  She was not admitted to the hospital during the last year.       Current Medical Providers:  Patient Care Team:  Vonda Barajas MD as PCP - General (Family Medicine)  Priya Kramer MD as Consulting Physician (Dermatology)  Jauna Adams MD as Consulting Physician (Obstetrics and Gynecology)  Edwin Jewell Jr., MD as Consulting Physician (Urology)  Gabriel Bae MD as Consulting Physician (Gastroenterology)    Outpatient Medications Prior to Visit   Medication Sig Dispense Refill   • fluticasone (Flonase) 50 MCG/ACT nasal spray 2 sprays into the nostril(s) as directed by provider Daily. 1 bottle 11   • losartan (COZAAR) 50 MG tablet Take 1 tablet by mouth Daily. 90 tablet 1   • multivitamin (THERAGRAN) tablet tablet Take  by mouth Daily.     • VITAMIN D PO Take 2 each by mouth.     • escitalopram (LEXAPRO) 10 MG tablet Take 1 tablet by mouth Daily. 90 tablet 1     No facility-administered medications prior to visit.       No opioid medication identified on active medication list. I have reviewed chart for other potential  high risk medication/s and harmful drug interactions in the elderly.          Aspirin is not on active medication list.  Aspirin use is not indicated based on review of current medical condition/s. Risk of harm outweighs potential benefits.  .    Patient Active  "Problem List   Diagnosis   • Foot mass, left   • Essential hypertension   • Non-seasonal allergic rhinitis   • Major depression in complete remission (HCC)   • Chronic interstitial cystitis     Advance Care Planning  Advance Directive is not on file.  ACP discussion was held with the patient during this visit. Patient does not have an advance directive, declines further assistance. she has pamphlets          Objective    Vitals:    12/22/22 0935   BP: 126/80   Pulse: 80   SpO2: 98%   Weight: 79.3 kg (174 lb 12.8 oz)   Height: 177.8 cm (70\")   PainSc: 0-No pain     Estimated body mass index is 25.08 kg/m² as calculated from the following:    Height as of this encounter: 177.8 cm (70\").    Weight as of this encounter: 79.3 kg (174 lb 12.8 oz).    BMI is >= 25 and <30. (Overweight) The following options were offered after discussion;: nutrition counseling/recommendations      Does the patient have evidence of cognitive impairment? No  Sometimes concern about memory. Walks into a room and forgets what to do.         HEALTH RISK ASSESSMENT    Smoking Status:  Social History     Tobacco Use   Smoking Status Never   Smokeless Tobacco Never     Alcohol Consumption:  Social History     Substance and Sexual Activity   Alcohol Use Yes   • Alcohol/week: 1.0 standard drink   • Types: 1 Glasses of wine per week     Fall Risk Screen:    SUKHI Fall Risk Assessment was completed, and patient is at LOW risk for falls.Assessment completed on:12/22/2022  SUKHI Fall Risk Clinician Key Questions   Have you fallen in the past year?: No  Do you feel unsteady with walking?: No  Are you worried about falling?: No    Stay Idependant Patient Questions   Have you been advised to use a cane or a walker to get around safely?: No  Do you steady yourself by holding onto furniture when walking at home?: No  Do you need to push with your hands to stand up from a chair?: No  Do you have trouble stepping up onto a curb?: No  Do you have to rush to " the toilet?: No  Have you lost some feeling in your feet?: No  Do you take medicine that sometimes makes you feel light headed or more tired than usual?: No  Do you take medicine to help you sleep or improve your mood?: No  Do you often feel sad or depressed?: No  Patient Fall Risk Assessment Score : 0      Depression Screening:  PHQ-2/PHQ-9 Depression Screening 12/22/2022   Retired PHQ-9 Total Score -   Retired Total Score -   Little Interest or Pleasure in Doing Things 0-->not at all   Feeling Down, Depressed or Hopeless 0-->not at all   PHQ-9: Brief Depression Severity Measure Score 0   Little interest or pleasure in doing things -   Feeling down, depressed or hopeless -   Trouble falling or staying asleep or sleeping too much -   Feeling tired or having little energy -   Poor appetite or overeating -   Feeling bad about yourself or that you are a failure or have let yourself or your family down -   Trouble concentrating on things, such as reading the newspaper or watching television -   Moving or speaking so slowly that other people could have noticed or the opposite, being so fidgety -   Thoughts that you would be better off dead or of hurting yourself in some way -   If you checked off any problems, how difficult have these problems made it for you to do your work, take care of things at home or get along with other people? -       Health Habits and Functional and Cognitive Screening:  Functional & Cognitive Status 12/22/2022   Do you have difficulty preparing food and eating? No   Do you have difficulty bathing yourself, getting dressed or grooming yourself? No   Do you have difficulty using the toilet? No   Do you have difficulty moving around from place to place? No   Do you have trouble with steps or getting out of a bed or a chair? No   Current Diet Well Balanced Diet   Dental Exam Up to date   Eye Exam Not up to date   Exercise (times per week) 7 times per week   Current Exercises Include Walking   Do  you need help using the phone?  No   Are you deaf or do you have serious difficulty hearing?  No   Do you need help with transportation? No   Do you need help shopping? No   Do you need help preparing meals?  No   Do you need help with housework?  No   Do you need help with laundry? No   Do you need help taking your medications? No   Do you need help managing money? No   Do you ever drive or ride in a car without wearing a seat belt? No   Have you felt unusual stress, anger or loneliness in the last month? No   Who do you live with? Alone   If you need help, do you have trouble finding someone available to you? No   Have you been bothered in the last four weeks by sexual problems? No   Do you have difficulty concentrating, remembering or making decisions? No       Age-appropriate Screening Schedule:  Refer to the list below for future screening recommendations based on patient's age, sex and/or medical conditions. Orders for these recommended tests are listed in the plan section. The patient has been provided with a written plan.    Health Maintenance   Topic Date Due   • LIPID PANEL  12/30/2022   • PAP SMEAR  03/06/2023   • DXA SCAN  03/19/2023   • MAMMOGRAM  07/18/2023   • TDAP/TD VACCINES (3 - Td or Tdap) 09/17/2030   • INFLUENZA VACCINE  Completed   • ZOSTER VACCINE  Completed              Immunization History   Administered Date(s) Administered   • COVID-19 (MODERNA) BIVALENT BOOSTER 12+YRS 10/30/2022   • COVID-19 (PFIZER) PURPLE CAP 02/13/2021, 03/06/2021, 10/04/2021   • Covid-19 (Pfizer) Gray Cap 08/04/2022   • FluLaval/Fluzone >6mos 10/22/2019, 09/17/2020   • Fluad Quad 65+ 10/24/2021, 10/05/2022   • Fluzone Quad >6mos (Multi-dose) 11/20/2015, 11/15/2016, 10/17/2017   • Hepatitis A 10/16/2018, 04/16/2019   • Pneumococcal Conjugate 20-Valent (PCV20) 12/22/2022   • Pneumococcal Polysaccharide (PPSV23) 12/14/2021   • Shingrix 04/01/2022, 10/17/2022   • Tdap 06/08/2009, 09/17/2020       CMS Preventative Services  "Quick Reference  Risk Factors Identified During Encounter  Depression/Dysphoria: Current medication is effective, no change recommended  Immunizations Discussed/Encouraged: Prevnar 20 (Pneumococcal 20-valent conjugate)  Overweight: Discussed high protein/low carb diet and handout provided on nutrition.  Insomnia: Discussed sleep hygiene and melatonin supplement.    The above risks/problems have been discussed with the patient.  Pertinent information has been shared with the patient in the After Visit Summary.  An After Visit Summary and PPPS were made available to the patient.    Follow Up:   Next Medicare Wellness visit to be scheduled in 1 year.       Additional E&M Note during same encounter follows:  Patient has multiple medical problems which are significant and separately identifiable that require additional work above and beyond the Medicare Wellness Visit.      Chief Complaint  Medicare Wellness-subsequent (fasting), Annual Exam, Hypertension, and Depression    Subjective        HPI  Keshav Gu is also being seen today for depression, hypertension, finger pain, insomnia.       Trigger thumb in right hand. Recently last 4-6 weeks right index finger MCP joint pain. Sometimes radiates into wrist.  No known triggers. Using voltaren gel.     She has difficulty falling asleep since time change. Falls asleep around 12 am. She is not using OTC sleep aids. She sometimes gets up to eat yogurt. Reading a book before bed.     She has considered after the first of year cut back dose of lexapro.         Objective   Vital Signs:  /80   Pulse 80   Ht 177.8 cm (70\")   Wt 79.3 kg (174 lb 12.8 oz)   SpO2 98%   BMI 25.08 kg/m²     Physical Exam  Constitutional:       General: She is not in acute distress.     Appearance: She is overweight.   HENT:      Right Ear: Tympanic membrane and ear canal normal.      Left Ear: Tympanic membrane and ear canal normal.   Eyes:      General:         Right eye: No " discharge.         Left eye: No discharge.      Conjunctiva/sclera: Conjunctivae normal.   Neck:      Thyroid: No thyromegaly.   Cardiovascular:      Rate and Rhythm: Normal rate and regular rhythm.   Pulmonary:      Effort: Pulmonary effort is normal.      Breath sounds: Normal breath sounds.   Abdominal:      Palpations: Abdomen is soft. There is no hepatomegaly.      Tenderness: There is no abdominal tenderness.   Musculoskeletal:      Right hand: No swelling.        Hands:       Cervical back: Neck supple.   Lymphadenopathy:      Head:      Right side of head: No submandibular, preauricular or posterior auricular adenopathy.      Left side of head: No submandibular, preauricular or posterior auricular adenopathy.      Cervical: No cervical adenopathy.   Skin:     General: Skin is warm.   Neurological:      Mental Status: She is alert and oriented to person, place, and time.   Psychiatric:         Mood and Affect: Mood normal.         Behavior: Behavior normal.         Thought Content: Thought content normal.         Judgment: Judgment normal.                         Assessment and Plan   Diagnoses and all orders for this visit:    1. Medicare annual wellness visit, subsequent (Primary)    2. Immunization due  -     Pneumococcal Conjugate Vaccine 20-Valent All    3. Essential hypertension  -     Comprehensive Metabolic Panel; Future  -     Lipid Panel; Future  -     TSH; Future  Stable.  Continue losartan 50 mg.  Recheck in 6 months.    4. Screening for deficiency anemia  -     CBC (No Diff); Future    5. Screening for diabetes mellitus  -     Comprehensive Metabolic Panel; Future    6. Major depression in complete remission (HCC)  -     escitalopram (LEXAPRO) 10 MG tablet; Take 1 tablet by mouth Daily.  Dispense: 90 tablet; Refill: 3  Stable.  Patient has considered weaning medication.  If she chooses after the holiday she may cut Lexapro in half to 5 mg dose.  She will inform me of her progress and I will  change the prescription if she is doing well at 5 mg.           Follow Up   Return in about 6 months (around 6/22/2023) for Office visit HTN and AWV fasting labs 1 year.  Patient was given instructions and counseling regarding her condition or for health maintenance advice. Please see specific information pulled into the AVS if appropriate.       Electronically signed by Vonda Barajas MD, 12/22/22, 10:06 AM EST.

## 2022-12-22 NOTE — PATIENT INSTRUCTIONS
Advance Care Planning and Advance Directives     You make decisions on a daily basis - decisions about where you want to live, your career, your home, your life. Perhaps one of the most important decisions you face is your choice for future medical care. Take time to talk with your family and your healthcare team and start planning today.  Advance Care Planning is a process that can help you:  Understand possible future healthcare decisions in light of your own experiences  Reflect on those decision in light of your goals and values  Discuss your decisions with those closest to you and the healthcare professionals that care for you  Make a plan by creating a document that reflects your wishes    Surrogate Decision Maker  In the event of a medical emergency, which has left you unable to communicate or to make your own decisions, you would need someone to make decisions for you.  It is important to discuss your preferences for medical treatment with this person while you are in good health.     Qualities of a surrogate decision maker:  Willing to take on this role and responsibility  Knows what you want for future medical care  Willing to follow your wishes even if they don't agree with them  Able to make difficult medical decisions under stressful circumstances    Advance Directives  These are legal documents you can create that will guide your healthcare team and decision maker(s) when needed. These documents can be stored in the electronic medical record.    Living Will - a legal document to guide your care if you have a terminal condition or a serious illness and are unable to communicate. States vary by statute in document names/types, but most forms may include one or more of the following:        -  Directions regarding life-prolonging treatments        -  Directions regarding artificially provided nutrition/hydration        -  Choosing a healthcare decision maker        -  Direction regarding organ/tissue  donation    Durable Power of  for Healthcare - this document names an -in-fact to make medical decisions for you, but it may also allow this person to make personal and financial decisions for you. Please seek the advice of an  if you need this type of document.    **Advance Directives are not required and no one may discriminate against you if you do not sign one.    Medical Orders  Many states allow specific forms/orders signed by your physician to record your wishes for medical treatment in your current state of health. This form, signed in personal communication with your physician, addresses resuscitation and other medical interventions that you may or may not want.      For more information or to schedule a time with a [facility] Advance Care Planning Facilitator contact: [employer] or call [phone] and someone will contact you directly.    Medicare Wellness  Personal Prevention Plan of Service     Date of Office Visit:    Encounter Provider:  Vonda Barajas MD  Place of Service:  [facility]  Patient Name: Keshav Gu  :  1955    As part of the Medicare Wellness portion of your visit today, we are providing you with this personalized preventive plan of services (PPPS). This plan is based upon recommendations of the [place] Preventive Services Task Force (USPSTF) and the Advisory Committee on Immunization Practices (ACIP).    This lists the preventive care services that should be considered, and provides dates of when you are due. Items listed as completed are up-to-date and do not require any further intervention.    Health Maintenance   Topic Date Due    LIPID PANEL  2022    PAP SMEAR  2023    DXA SCAN  2023    MAMMOGRAM  2023    ANNUAL WELLNESS VISIT  2023    COLORECTAL CANCER SCREENING  2026    TDAP/TD VACCINES (3 - Td or Tdap) 2030    HEPATITIS C SCREENING  Completed     COVID-19 Vaccine  Completed    INFLUENZA VACCINE  Completed    Pneumococcal Vaccine 65+  Completed    ZOSTER VACCINE  Completed       Orders Placed This Encounter   Procedures    Pneumococcal Conjugate Vaccine 20-Valent All    CBC (No Diff)     Standing Status:   Future     Standing Expiration Date:   12/22/2023     Order Specific Question:   Release to patient     Answer:   Routine Release    Comprehensive Metabolic Panel     Standing Status:   Future     Standing Expiration Date:   12/22/2023     Order Specific Question:   Release to patient     Answer:   Routine Release    Lipid Panel     Standing Status:   Future     Standing Expiration Date:   12/22/2023    TSH     Standing Status:   Future     Standing Expiration Date:   12/22/2023     Order Specific Question:   Release to patient     Answer:   Routine Release         Return in about 6 months (around 6/22/2023) for Office visit HTN and AWV fasting labs 1 year.      Nutrition  Meet your nutrient needs primarily through nutrition by consuming foods and not just supplements  The Mediterranean diet and DASH (Dietary Approaches to Stop Hypertension) diet are proven diets to become healthier and lowering the risk of high blood pressure, some kinds of cancer, stroke, heart disease, heart failure, kidney stones, and diabetes. They are also effective at weight loss.  Macronutrient targets % total calories : Carbohydrates 50% (45-65%),  fat 30% (20-35%),  protein 20% (10-35%).   Emphasize vegetables, fruits, whole grains, nuts and seeds, beans and legumes, olive oil, and drink water. Small amounts of dairy, fish and seafood, and lean meat such as poultry. Occasional beef, pork, lamb, sweets and processed foods such as baked goods, chips, crackers, chocolate and candy.   Frozen vegetables and fruit are minimally processed , picked at its peak, convenient, and helps reduce food waste  Try low-sodium canned tomatoes, beans, and vegetables. You can also rinse in water to  help remove some sodium.   Canned fruits packed in fruit juice is a better option than heavy syrup.   Recommend whole fruits over juice. Dried fruits are ok but have a higher sugar content.   Eat the rainbow. Vary your veggies with dark green, red and orange colors.   Make half your grains whole grains. Oatmeal brown rice, quinoa, farro, whole-grain pasta, whole-grain bread, and whole-grain tortillas.   Include a variety of protein sources such as lean meats, poultry, fish, seafood, beans, peas, nuts, seeds, eggs, soy   Move to low-fat or fat-free milk or yogurt. Limit cheese.   Other products sold as “milks” made from plants, such as rice, almond, coconut, and hemp “milks,” may contain calcium, but are not included in the dairy group because their overall nutrient content is not similar to dairy milk and fortified soy beverages   Use oils like canola, olive, and others instead of solid fats (like butter and stick margarine, shortening, lard, and coconut oil)  Try grilling, broiling, roasting, or baking--they don't add extra fat  Added sugars include syrups and other sweeteners (brown sugar, corn sweetener, corn syrup, dextrose, fructose, glucose, high fructose corn syrup, honey, invert sugar, lactose, malt syrup, maltose, molasses, raw sugar, sucrose, trehalose, and turbinado sugar). When sugars are added to foods to bogdan them, they add calories without contributing essential nutrients  Cut calories by drinking water or unsweetened beverages. Soda, energy drinks, and sports drinks are a major source of added sugars  Water intake of 1.5L - 2L (50-70 ounces) per day  Daily fiber intake 20 g to 35 g per day  Soluble fiber pulls in water to slow solidify and slow loose, watery stools plus lower cholesterol. Examples are oats, peas, beans, apples, citrus fruits, carrots, barley and psyllium   Insoluble fiber is “roughage” to add bulk, make stool easier to pass and helps constipation. Examples are whole-wheat flour,  wheat bran, nuts, beans and vegetables, such as cauliflower, green beans and potatoes.

## 2022-12-27 ENCOUNTER — TELEPHONE (OUTPATIENT)
Dept: FAMILY MEDICINE CLINIC | Facility: CLINIC | Age: 67
End: 2022-12-27

## 2022-12-27 NOTE — TELEPHONE ENCOUNTER
Contacted patient to notify. She does not wish to start new statin. She will focus on diet and exercise firstly

## 2022-12-27 NOTE — TELEPHONE ENCOUNTER
Please call patient about high cholesterol levels.  If she interested in starting a statin cholesterol-lowering medication?    Normal thyroid function.  Total cholesterol and LDL bad cholesterol are both elevated.  Your 10-year estimated risk for heart attack or stroke is borderline at 7.4%.  I recommend a Mediterranean style diet to help lower cholesterol.  If you are interested in medications I will prescribe a statin cholesterol-lowering medication to further reduce heart attack and stroke risk.  Other labs show normal kidney function, electrolytes, and liver function.  Normal white blood cell count, no anemia, and normal platelets.

## 2023-03-19 ENCOUNTER — PATIENT MESSAGE (OUTPATIENT)
Dept: FAMILY MEDICINE CLINIC | Facility: CLINIC | Age: 68
End: 2023-03-19
Payer: MEDICARE

## 2023-03-19 DIAGNOSIS — F32.5 MAJOR DEPRESSION IN COMPLETE REMISSION: ICD-10-CM

## 2023-03-20 RX ORDER — ESCITALOPRAM OXALATE 5 MG/1
5 TABLET ORAL DAILY
Qty: 90 TABLET | Refills: 3 | Status: SHIPPED | OUTPATIENT
Start: 2023-03-20

## 2023-03-22 ENCOUNTER — HOSPITAL ENCOUNTER (OUTPATIENT)
Dept: GENERAL RADIOLOGY | Facility: HOSPITAL | Age: 68
Discharge: HOME | End: 2023-03-22
Admitting: FAMILY MEDICINE
Payer: MEDICARE

## 2023-03-22 ENCOUNTER — PATIENT MESSAGE (OUTPATIENT)
Dept: FAMILY MEDICINE CLINIC | Facility: CLINIC | Age: 68
End: 2023-03-22

## 2023-03-22 ENCOUNTER — OFFICE VISIT (OUTPATIENT)
Dept: FAMILY MEDICINE CLINIC | Facility: CLINIC | Age: 68
End: 2023-03-22
Payer: MEDICARE

## 2023-03-22 VITALS
WEIGHT: 169.4 LBS | SYSTOLIC BLOOD PRESSURE: 122 MMHG | BODY MASS INDEX: 24.25 KG/M2 | HEIGHT: 70 IN | HEART RATE: 82 BPM | DIASTOLIC BLOOD PRESSURE: 64 MMHG | OXYGEN SATURATION: 96 %

## 2023-03-22 DIAGNOSIS — R05.1 ACUTE COUGH: Primary | ICD-10-CM

## 2023-03-22 DIAGNOSIS — R05.1 ACUTE COUGH: ICD-10-CM

## 2023-03-22 PROCEDURE — 3078F DIAST BP <80 MM HG: CPT | Performed by: FAMILY MEDICINE

## 2023-03-22 PROCEDURE — 99213 OFFICE O/P EST LOW 20 MIN: CPT | Performed by: FAMILY MEDICINE

## 2023-03-22 PROCEDURE — 1159F MED LIST DOCD IN RCRD: CPT | Performed by: FAMILY MEDICINE

## 2023-03-22 PROCEDURE — 3074F SYST BP LT 130 MM HG: CPT | Performed by: FAMILY MEDICINE

## 2023-03-22 PROCEDURE — 71046 X-RAY EXAM CHEST 2 VIEWS: CPT

## 2023-03-22 PROCEDURE — 1160F RVW MEDS BY RX/DR IN RCRD: CPT | Performed by: FAMILY MEDICINE

## 2023-03-22 RX ORDER — GUAIFENESIN AND CODEINE PHOSPHATE 100; 10 MG/5ML; MG/5ML
10 SOLUTION ORAL 4 TIMES DAILY PRN
Qty: 120 ML | Refills: 0 | Status: SHIPPED | OUTPATIENT
Start: 2023-03-22 | End: 2023-03-25

## 2023-03-22 NOTE — PROGRESS NOTES
"Chief Complaint  Cough (1 month )    Subjective        Keshav Gu presents to [facility]  Cough  This is a new problem. The current episode started 1 to 4 weeks ago. The problem has been unchanged. The problem occurs every few minutes. The cough is non-productive. Associated symptoms include ear pain, headaches, nasal congestion, postnasal drip and shortness of breath. Pertinent negatives include no chest pain or chills. Risk factors for lung disease include animal exposure. Her past medical history is significant for bronchitis and environmental allergies. There is no history of asthma or COPD.   Answers for HPI/ROS submitted by the patient on 3/21/2023  What is the primary reason for your visit?: Cough    2/14/23 she contracted a stomach bug. The next day cold symptoms runny nose and sneezing. Went into sinus infection. After that cleared she continues to have cough. Worse in afternoon and with talking. She has increased fatigue. She doesn't feel she has normal capacity. Walking around she is normal. Not out of breath going up stairs, but more of a strain. Dry cough, never mucus. Back has some tightness. No chest pain. Recently taking Delsym cough medication. She also tried multisymptom cold medication without relief. She is taking medications for allergies. Able to sleep without waking up. She has cut back on portions and is loosing weight.     Objective   Vital Signs:  /64   Pulse 82   Ht 177.8 cm (70\")   Wt 76.8 kg (169 lb 6.4 oz)   SpO2 96%   BMI 24.31 kg/m²   Estimated body mass index is 24.31 kg/m² as calculated from the following:    Height as of this encounter: 177.8 cm (70\").    Weight as of this encounter: 76.8 kg (169 lb 6.4 oz).       BMI is within normal parameters. No other follow-up for BMI required.      Physical Exam  Vitals reviewed.   Constitutional:       General: She is not in acute distress.     Appearance: She is not ill-appearing.   HENT: "      Nose: No congestion or rhinorrhea.      Mouth/Throat:      Mouth: Mucous membranes are moist.      Pharynx: No oropharyngeal exudate.   Cardiovascular:      Rate and Rhythm: Normal rate and regular rhythm.   Pulmonary:      Effort: Pulmonary effort is normal.      Breath sounds: Normal breath sounds.      Comments: Barking, nonproductive frequent cough  Neurological:      Mental Status: She is alert.        Result Review :                   Assessment and Plan   Diagnoses and all orders for this visit:    1. Acute cough (Primary)  -     XR Chest PA & Lateral; Future  -     guaiFENesin-codeine (GUAIFENESIN AC) 100-10 MG/5ML liquid; Take 10 mL by mouth 4 (Four) Times a Day As Needed for Cough for up to 3 days.  Dispense: 120 mL; Refill: 0    New. Recommend further evaluation with chest x-ray.  If pneumonia detected I would recommend a course of antibiotics.  For persistent cough I recommend the use of cough syrup with codeine.  Advised if out of stock at the pharmacy let me know and I will switch to another cough medication.  Xavier report reviewed.         Follow Up   Return for as scheduled.  Patient was given instructions and counseling regarding her condition or for health maintenance advice. Please see specific information pulled into the AVS if appropriate.     Electronically signed by Vonda Barajas MD, 03/22/23, 10:31 AM EDT.

## 2023-04-10 ENCOUNTER — PATIENT MESSAGE (OUTPATIENT)
Dept: FAMILY MEDICINE CLINIC | Facility: CLINIC | Age: 68
End: 2023-04-10
Payer: MEDICARE

## 2023-04-10 DIAGNOSIS — R05.1 ACUTE COUGH: Primary | ICD-10-CM

## 2023-04-12 ENCOUNTER — TELEPHONE (OUTPATIENT)
Dept: FAMILY MEDICINE CLINIC | Facility: CLINIC | Age: 68
End: 2023-04-12
Payer: MEDICARE

## 2023-04-12 NOTE — TELEPHONE ENCOUNTER
Caller: Keshav Gu    Relationship: Self    Best call back number: [phone]    Additional notes: PATIENT STATES SHE REQUESTED AN ENT REFERRAL A COUPLE WEEKS AGO AND PCP RESPONDED IN MYCHART THAT A REFERRAL HAD BEEN PLACED AND SHE SHOULD BE ABLE TO SEE THE REFERRAL IN MYCHART. PATIENT STATES SHE DOES NOT SEE A REFERRAL AND WOULD LIKE TO ASK THAT REFERRAL INFORMATION BE SENT TO HER ON MYCHART.

## 2023-05-02 ENCOUNTER — TELEPHONE (OUTPATIENT)
Dept: FAMILY MEDICINE CLINIC | Facility: CLINIC | Age: 68
End: 2023-05-02
Payer: MEDICARE

## 2023-05-02 NOTE — TELEPHONE ENCOUNTER
Patient reports she has stopped the medicine and fluttering in chest has stopped. I advised her to notify ENT and follow up she has ongoing concerns.

## 2023-05-02 NOTE — TELEPHONE ENCOUNTER
Caller: Keshav Gu    Relationship: Self    Best call back number: [phone]    What is the best time to reach you: ANYTIME    Who are you requesting to speak with (clinical staff, provider,  specific staff member): PROVIDER    Do you know the name of the person who called: SELF    What was the call regarding: PATIENT STATES THAT SHE WAS PRESCRIBED SINGULAIR 10MG BY HER E.N.T AND SHE HAD A REACTION THAT WAS CHEST PAINS. PATIENT WOULD LIKE A CALL TO SEE IF SHE NEEDS TO BE SEEN BY A CARDIOLOGIST.     Do you require a callback: YES

## 2023-05-10 ENCOUNTER — PATIENT MESSAGE (OUTPATIENT)
Dept: FAMILY MEDICINE CLINIC | Facility: CLINIC | Age: 68
End: 2023-05-10

## 2023-05-10 ENCOUNTER — LAB (OUTPATIENT)
Dept: LAB | Facility: HOSPITAL | Age: 68
End: 2023-05-10
Payer: MEDICARE

## 2023-05-10 ENCOUNTER — OFFICE VISIT (OUTPATIENT)
Dept: FAMILY MEDICINE CLINIC | Facility: CLINIC | Age: 68
End: 2023-05-10
Payer: MEDICARE

## 2023-05-10 VITALS
SYSTOLIC BLOOD PRESSURE: 112 MMHG | BODY MASS INDEX: 24.14 KG/M2 | OXYGEN SATURATION: 96 % | WEIGHT: 168.6 LBS | HEART RATE: 76 BPM | HEIGHT: 70 IN | DIASTOLIC BLOOD PRESSURE: 80 MMHG

## 2023-05-10 DIAGNOSIS — E78.5 DYSLIPIDEMIA: ICD-10-CM

## 2023-05-10 DIAGNOSIS — R00.2 PALPITATIONS: ICD-10-CM

## 2023-05-10 DIAGNOSIS — R00.2 PALPITATIONS: Primary | ICD-10-CM

## 2023-05-10 LAB
ALBUMIN SERPL-MCNC: 4.7 G/DL (ref 3.5–5.2)
ALBUMIN/GLOB SERPL: 2 G/DL
ALP SERPL-CCNC: 59 U/L (ref 39–117)
ALT SERPL-CCNC: 22 U/L (ref 1–33)
AST SERPL-CCNC: 22 U/L (ref 1–32)
BILIRUB SERPL-MCNC: 0.5 MG/DL (ref 0–1.2)
BUN SERPL-MCNC: 20 MG/DL (ref 8–23)
BUN/CREAT SERPL: 21.5 (ref 7–25)
CALCIUM SERPL-MCNC: 10.1 MG/DL (ref 8.6–10.5)
CHLORIDE SERPL-SCNC: 105 MMOL/L (ref 98–107)
CHOLEST SERPL-MCNC: 236 MG/DL (ref 0–200)
CO2 SERPL-SCNC: 27.3 MMOL/L (ref 22–29)
CREAT SERPL-MCNC: 0.93 MG/DL (ref 0.57–1)
EGFRCR SERPLBLD CKD-EPI 2021: 67.5 ML/MIN/1.73
GLOBULIN SER CALC-MCNC: 2.4 GM/DL
GLUCOSE SERPL-MCNC: 94 MG/DL (ref 65–99)
HDLC SERPL-MCNC: 79 MG/DL (ref 40–60)
LDLC SERPL CALC-MCNC: 147 MG/DL (ref 0–100)
MAGNESIUM SERPL-MCNC: 2.2 MG/DL (ref 1.6–2.4)
POTASSIUM SERPL-SCNC: 4.3 MMOL/L (ref 3.5–5.2)
PROT SERPL-MCNC: 7.1 G/DL (ref 6–8.5)
SODIUM SERPL-SCNC: 143 MMOL/L (ref 136–145)
TRIGL SERPL-MCNC: 59 MG/DL (ref 0–150)
TSH SERPL DL<=0.005 MIU/L-ACNC: 2.06 UIU/ML (ref 0.27–4.2)
VLDLC SERPL CALC-MCNC: 10 MG/DL (ref 5–40)

## 2023-05-10 PROCEDURE — 1159F MED LIST DOCD IN RCRD: CPT | Performed by: FAMILY MEDICINE

## 2023-05-10 PROCEDURE — 3074F SYST BP LT 130 MM HG: CPT | Performed by: FAMILY MEDICINE

## 2023-05-10 PROCEDURE — 1160F RVW MEDS BY RX/DR IN RCRD: CPT | Performed by: FAMILY MEDICINE

## 2023-05-10 PROCEDURE — 99214 OFFICE O/P EST MOD 30 MIN: CPT | Performed by: FAMILY MEDICINE

## 2023-05-10 PROCEDURE — 93000 ELECTROCARDIOGRAM COMPLETE: CPT | Performed by: FAMILY MEDICINE

## 2023-05-10 PROCEDURE — 3079F DIAST BP 80-89 MM HG: CPT | Performed by: FAMILY MEDICINE

## 2023-05-10 RX ORDER — MONTELUKAST SODIUM 10 MG/1
10 TABLET ORAL NIGHTLY
COMMUNITY

## 2023-05-10 RX ORDER — LEVOCETIRIZINE DIHYDROCHLORIDE 5 MG/1
1 TABLET, FILM COATED ORAL EVERY 24 HOURS
COMMUNITY

## 2023-05-10 NOTE — PROGRESS NOTES
"Chief Complaint  Chest fluttering (Started at night on 4/29 thought it was from medication so stopped it and then it happened again 1 week later )    Subjective        Keshav Gu presents to [facility]  History of Present Illness  Answers for HPI/ROS submitted by the patient on 5/10/2023  Please describe your symptoms.: Fluttering in left upper chest  Have you had these symptoms before?: No  How long have you been having these symptoms?: 5-7 days  Please list any medications you are currently taking for this condition.: None  Please describe any probable cause for these symptoms. : Unknown  What is the primary reason for your visit?: Other    In the night she had gone to the bathroom, going back to bed she felt palpitations for about 3 hours. She stopped singulair from ENT worried if medication side effect. Singulair for 1 week helped cough. She had brief palpitations a couple times in the day. She felt it again 5/7/23 and 5/8/23. No symptoms last night or this morning. Left chest fluttering. No shortness of breath, lightheaded or dizziness. She checked her fitness watch and HR 80s-90s.     Objective   Vital Signs:  /80   Pulse 76   Ht 177.8 cm (70\")   Wt 76.5 kg (168 lb 9.6 oz)   SpO2 96%   BMI 24.19 kg/m²   Estimated body mass index is 24.19 kg/m² as calculated from the following:    Height as of this encounter: 177.8 cm (70\").    Weight as of this encounter: 76.5 kg (168 lb 9.6 oz).       BMI is within normal parameters. No other follow-up for BMI required.      Physical Exam  Vitals reviewed.   Constitutional:       General: She is not in acute distress.     Appearance: She is not ill-appearing.   Neck:      Thyroid: No thyroid mass, thyromegaly or thyroid tenderness.   Cardiovascular:      Rate and Rhythm: Normal rate and regular rhythm.   Pulmonary:      Effort: Pulmonary effort is normal.      Breath sounds: Normal breath sounds.   Neurological:      Mental " Status: She is alert.        Result Review :        Holter monitor - 48 hour (03/04/2021 09:23)        ECG 12 Lead    Date/Time: 5/10/2023 9:54 AM  Performed by: Vonda Barajas MD  Authorized by: Vonda Barajas MD   Comparison: compared with previous ECG from 1/17/2017  Similar to previous ECG  Rhythm: sinus rhythm  Rate: normal  Rate comments: 68  Conduction: conduction normal  ST Segments: ST segments normal  T Waves: T waves normal  QRS axis: normal    Clinical impression: normal ECG              Assessment and Plan   Diagnoses and all orders for this visit:    1. Palpitations (Primary)  -     Comprehensive Metabolic Panel; Future  -     TSH Rfx On Abnormal To Free T4; Future  -     Magnesium; Future  -     ECG 12 Lead  -     Holter Monitor - 72 Hour Up To 15 Days; Future  EKG in the office normal.  Further investigation with labs and Holter monitor.  2. Dyslipidemia  -     Lipid Panel; Future  Check follow-up cholesterol level today.  Not currently on statin.    She will be having upcoming allergy testing and was told to stop medications.  I do not want her to stop Lexapro as she could have withdrawal symptoms.       Follow Up   No follow-ups on file.  Patient was given instructions and counseling regarding her condition or for health maintenance advice. Please see specific information pulled into the AVS if appropriate.     Electronically signed by Vonda Barajas MD, 05/10/23, 9:55 AM EDT.

## 2023-07-25 ENCOUNTER — HOSPITAL ENCOUNTER (OUTPATIENT)
Dept: MAMMOGRAPHY | Facility: HOSPITAL | Age: 68
Discharge: HOME | End: 2023-07-25
Admitting: FAMILY MEDICINE
Payer: MEDICARE

## 2023-07-25 DIAGNOSIS — Z12.31 VISIT FOR SCREENING MAMMOGRAM: ICD-10-CM

## 2023-07-25 PROCEDURE — 77067 SCR MAMMO BI INCL CAD: CPT

## 2023-07-25 PROCEDURE — 77063 BREAST TOMOSYNTHESIS BI: CPT

## 2023-07-31 ENCOUNTER — TELEPHONE (OUTPATIENT)
Dept: FAMILY MEDICINE CLINIC | Facility: CLINIC | Age: 68
End: 2023-07-31
Payer: MEDICARE

## 2023-08-10 ENCOUNTER — HOSPITAL ENCOUNTER (OUTPATIENT)
Dept: MAMMOGRAPHY | Facility: HOSPITAL | Age: 68
Discharge: HOME | End: 2023-08-10
Admitting: RADIOLOGY
Payer: MEDICARE

## 2023-08-10 DIAGNOSIS — R92.8 ABNORMAL MAMMOGRAM: ICD-10-CM

## 2023-08-10 PROCEDURE — G0279 TOMOSYNTHESIS, MAMMO: HCPCS

## 2023-08-10 PROCEDURE — 77065 DX MAMMO INCL CAD UNI: CPT | Performed by: RADIOLOGY

## 2023-08-10 PROCEDURE — 77065 DX MAMMO INCL CAD UNI: CPT

## 2023-08-10 PROCEDURE — G0279 TOMOSYNTHESIS, MAMMO: HCPCS | Performed by: RADIOLOGY

## 2023-08-17 ENCOUNTER — OFFICE VISIT (OUTPATIENT)
Dept: FAMILY MEDICINE CLINIC | Facility: CLINIC | Age: 68
End: 2023-08-17
Payer: MEDICARE

## 2023-08-17 ENCOUNTER — HOSPITAL ENCOUNTER (OUTPATIENT)
Dept: CT IMAGING | Facility: HOSPITAL | Age: 68
Discharge: HOME | End: 2023-08-17
Admitting: FAMILY MEDICINE
Payer: MEDICARE

## 2023-08-17 VITALS
BODY MASS INDEX: 23.54 KG/M2 | WEIGHT: 164.4 LBS | SYSTOLIC BLOOD PRESSURE: 120 MMHG | OXYGEN SATURATION: 98 % | DIASTOLIC BLOOD PRESSURE: 82 MMHG | HEIGHT: 70 IN

## 2023-08-17 DIAGNOSIS — N95.0 POSTMENOPAUSAL BLEEDING: ICD-10-CM

## 2023-08-17 DIAGNOSIS — R10.30 LOWER ABDOMINAL PAIN: ICD-10-CM

## 2023-08-17 DIAGNOSIS — R10.30 LOWER ABDOMINAL PAIN: Primary | ICD-10-CM

## 2023-08-17 LAB
BILIRUB BLD-MCNC: NEGATIVE MG/DL
CLARITY, POC: CLEAR
COLOR UR: YELLOW
CREAT BLDA-MCNC: 0.7 MG/DL (ref 0.6–1.3)
EXPIRATION DATE: NORMAL
GLUCOSE UR STRIP-MCNC: NEGATIVE MG/DL
KETONES UR QL: NEGATIVE
LEUKOCYTE EST, POC: NEGATIVE
Lab: NORMAL
NITRITE UR-MCNC: NEGATIVE MG/ML
PH UR: 6 [PH] (ref 5–8)
PROT UR STRIP-MCNC: NEGATIVE MG/DL
RBC # UR STRIP: NEGATIVE /UL
SP GR UR: 1.02 (ref 1–1.03)
UROBILINOGEN UR QL: NORMAL

## 2023-08-17 PROCEDURE — 1160F RVW MEDS BY RX/DR IN RCRD: CPT | Performed by: FAMILY MEDICINE

## 2023-08-17 PROCEDURE — 81003 URINALYSIS AUTO W/O SCOPE: CPT | Performed by: FAMILY MEDICINE

## 2023-08-17 PROCEDURE — 74178 CT ABD&PLV WO CNTR FLWD CNTR: CPT

## 2023-08-17 PROCEDURE — 99214 OFFICE O/P EST MOD 30 MIN: CPT | Performed by: FAMILY MEDICINE

## 2023-08-17 PROCEDURE — 82565 ASSAY OF CREATININE: CPT

## 2023-08-17 PROCEDURE — 1159F MED LIST DOCD IN RCRD: CPT | Performed by: FAMILY MEDICINE

## 2023-08-17 PROCEDURE — 3074F SYST BP LT 130 MM HG: CPT | Performed by: FAMILY MEDICINE

## 2023-08-17 PROCEDURE — 3079F DIAST BP 80-89 MM HG: CPT | Performed by: FAMILY MEDICINE

## 2023-08-17 PROCEDURE — 25510000001 IOPAMIDOL 61 % SOLUTION: Performed by: FAMILY MEDICINE

## 2023-08-17 PROCEDURE — 0 DIATRIZOATE MEGLUMINE & SODIUM PER 1 ML: Performed by: FAMILY MEDICINE

## 2023-08-17 RX ADMIN — IOPAMIDOL 90 ML: 612 INJECTION, SOLUTION INTRAVENOUS at 18:51

## 2023-08-17 NOTE — PROGRESS NOTES
"Chief Complaint  Abdominal Cramping (2 weeks ) and Vaginal Discharge (Saw some blood a couple of times)    Subjective        Keshav uG presents to [facility]  Female  Problem  The patient's primary symptoms include pelvic pain, vaginal bleeding and vaginal discharge. The patient's pertinent negatives include no genital itching, genital lesions, genital rash or missed menses. This is a new problem. The current episode started 1 to 4 weeks ago. The problem occurs daily. The problem has been unchanged. The pain is moderate. The problem affects both sides. She is not pregnant. Associated symptoms include abdominal pain and frequency. Pertinent negatives include no anorexia, back pain, chills, constipation, diarrhea, discolored urine, dysuria, fever, flank pain, headaches, hematuria, joint pain, joint swelling, nausea, painful intercourse, rash, sore throat, urgency or vomiting. The vaginal discharge was mucoid. The vaginal bleeding is spotting. She has not been passing clots. She has not been passing tissue. Nothing aggravates the symptoms. She is not sexually active. No, her partner does not have an STD. She uses abstinence for contraception. She is postmenopausal.       Answers submitted by the patient for this visit:  Primary Reason for Visit (Submitted on 8/17/2023)  What is the primary reason for your visit?: Vaginal Discharge/Irritation    She has been exercising more and felt soreness. Pain described as menstrual cramps. Tiny streak of blood with wiping. Small discharge brownish streak. She has seen OB/GYN. She had testing last week. Pain worse at night. Bilateral lower abdominal pain. Bowel habits unchanged and regular stools. No constipation or diarrhea. She has increased urinary frequency. She is taking ibuprofen at night for pain control. Daytime Tylenol when needed 1-2 times a day.               Objective   Vital Signs:  /82   Ht 177.8 cm (70\")   Wt 74.6 kg " "(164 lb 6.4 oz)   SpO2 98%   BMI 23.59 kg/mý   Estimated body mass index is 23.59 kg/mý as calculated from the following:    Height as of this encounter: 177.8 cm (70\").    Weight as of this encounter: 74.6 kg (164 lb 6.4 oz).       BMI is within normal parameters. No other follow-up for BMI required.      Physical Exam  Vitals reviewed.   Constitutional:       General: She is not in acute distress.     Appearance: She is not ill-appearing.   Cardiovascular:      Rate and Rhythm: Normal rate and regular rhythm.   Pulmonary:      Effort: Pulmonary effort is normal.      Breath sounds: Normal breath sounds.   Abdominal:      General: Abdomen is flat. There is no distension.      Palpations: Abdomen is soft.      Tenderness: There is no guarding or rebound.       Musculoskeletal:      Comments: No cva tenderness   Neurological:      Mental Status: She is alert.      Result Review :          Results for orders placed or performed in visit on 08/17/23   POC Urinalysis Dipstick, Automated    Specimen: Urine   Result Value Ref Range    Color Yellow Yellow, Straw, Dark Yellow, Lidia    Clarity, UA Clear Clear    Specific Gravity  1.020 1.005 - 1.030    pH, Urine 6.0 5.0 - 8.0    Leukocytes Negative Negative    Nitrite, UA Negative Negative    Protein, POC Negative Negative mg/dL    Glucose, UA Negative Negative mg/dL    Ketones, UA Negative Negative    Urobilinogen, UA Normal Normal, 0.2 E.U./dL    Bilirubin Negative Negative    Blood, UA Negative Negative    Lot Number [id number]     Expiration Date 3/25/2024            SCANNED - COLONOSCOPY (04/07/2021)   Ultrasound pelvis 8/9/2023: Endometrial thickness 3.6 mm.  Uterus within normal limits.  Ovaries not visualized due to overlying bowel gas.    General culture 8/9/2023: Routine genital christopher    Candida/bacterial vaginosis 8/9/2023: Negative     Assessment and Plan   Diagnoses and all orders for this visit:    1. Lower abdominal pain (Primary)  -     POC Urinalysis " Dipstick, Automated  -     CT Abdomen Pelvis With & Without Contrast; Future    2. Postmenopausal bleeding      New. Further evaluation of acute bilateral lower abdominal pain with stat CT scan.  If diverticulitis is noted then I will send antibiotics to her pharmacy.  Reviewing pelvic ultrasound endometrium was normal but unable to visualize ovaries.  Urinalysis in the office today normal without signs of infection.  If postmenopausal bleeding returns recommend follow-up with OB/GYN.         Follow Up   Return if symptoms worsen or fail to improve.  Patient was given instructions and counseling regarding her condition or for health maintenance advice. Please see specific information pulled into the AVS if appropriate.     Electronically signed by Vonda Barajas MD, 08/17/23, 11:01 AM EDT.

## 2023-08-18 ENCOUNTER — PATIENT MESSAGE (OUTPATIENT)
Dept: FAMILY MEDICINE CLINIC | Facility: CLINIC | Age: 68
End: 2023-08-18
Payer: MEDICARE

## 2023-09-08 ENCOUNTER — LAB (OUTPATIENT)
Dept: LAB | Facility: HOSPITAL | Age: 68
End: 2023-09-08
Payer: MEDICARE

## 2023-09-08 DIAGNOSIS — E78.5 DYSLIPIDEMIA: ICD-10-CM

## 2023-09-08 DIAGNOSIS — E78.5 DYSLIPIDEMIA: Primary | ICD-10-CM

## 2023-09-08 LAB
CHOLEST SERPL-MCNC: 158 MG/DL (ref 0–200)
HDLC SERPL-MCNC: 75 MG/DL (ref 40–60)
LDLC SERPL CALC-MCNC: 74 MG/DL (ref 0–100)
TRIGL SERPL-MCNC: 40 MG/DL (ref 0–150)
VLDLC SERPL CALC-MCNC: 9 MG/DL (ref 5–40)

## 2024-01-03 ENCOUNTER — OFFICE VISIT (OUTPATIENT)
Age: 69
End: 2024-01-03
Payer: MEDICARE

## 2024-01-03 ENCOUNTER — LAB (OUTPATIENT)
Age: 69
End: 2024-01-03
Payer: MEDICARE

## 2024-01-03 VITALS
OXYGEN SATURATION: 98 % | HEART RATE: 74 BPM | HEIGHT: 70 IN | BODY MASS INDEX: 24.11 KG/M2 | SYSTOLIC BLOOD PRESSURE: 118 MMHG | DIASTOLIC BLOOD PRESSURE: 74 MMHG | WEIGHT: 168.4 LBS

## 2024-01-03 DIAGNOSIS — Z78.0 MENOPAUSE: ICD-10-CM

## 2024-01-03 DIAGNOSIS — I10 ESSENTIAL HYPERTENSION, MALIGNANT: ICD-10-CM

## 2024-01-03 DIAGNOSIS — Z00.00 MEDICARE ANNUAL WELLNESS VISIT, SUBSEQUENT: Primary | ICD-10-CM

## 2024-01-03 DIAGNOSIS — E78.5 DYSLIPIDEMIA: ICD-10-CM

## 2024-01-03 DIAGNOSIS — Z13.0 SCREENING FOR DEFICIENCY ANEMIA: ICD-10-CM

## 2024-01-03 DIAGNOSIS — F32.5 MAJOR DEPRESSION IN COMPLETE REMISSION: ICD-10-CM

## 2024-01-03 DIAGNOSIS — Z13.820 ENCOUNTER FOR SCREENING FOR OSTEOPOROSIS: ICD-10-CM

## 2024-01-03 DIAGNOSIS — I10 ESSENTIAL HYPERTENSION: Chronic | ICD-10-CM

## 2024-01-03 DIAGNOSIS — Z13.0 SCREENING FOR IRON DEFICIENCY ANEMIA: Primary | ICD-10-CM

## 2024-01-03 DIAGNOSIS — R91.1 INCIDENTAL LUNG NODULE, > 3MM AND < 8MM: ICD-10-CM

## 2024-01-03 DIAGNOSIS — E78.5 HYPERLIPIDEMIA, UNSPECIFIED HYPERLIPIDEMIA TYPE: ICD-10-CM

## 2024-01-03 PROCEDURE — 36415 COLL VENOUS BLD VENIPUNCTURE: CPT | Performed by: FAMILY MEDICINE

## 2024-01-03 PROCEDURE — 1170F FXNL STATUS ASSESSED: CPT | Performed by: FAMILY MEDICINE

## 2024-01-03 PROCEDURE — 96160 PT-FOCUSED HLTH RISK ASSMT: CPT | Performed by: FAMILY MEDICINE

## 2024-01-03 PROCEDURE — 1160F RVW MEDS BY RX/DR IN RCRD: CPT | Performed by: FAMILY MEDICINE

## 2024-01-03 PROCEDURE — 1159F MED LIST DOCD IN RCRD: CPT | Performed by: FAMILY MEDICINE

## 2024-01-03 PROCEDURE — 3078F DIAST BP <80 MM HG: CPT | Performed by: FAMILY MEDICINE

## 2024-01-03 PROCEDURE — 3074F SYST BP LT 130 MM HG: CPT | Performed by: FAMILY MEDICINE

## 2024-01-03 PROCEDURE — 99397 PER PM REEVAL EST PAT 65+ YR: CPT | Performed by: FAMILY MEDICINE

## 2024-01-03 PROCEDURE — G0439 PPPS, SUBSEQ VISIT: HCPCS | Performed by: FAMILY MEDICINE

## 2024-01-03 RX ORDER — LOSARTAN POTASSIUM 50 MG/1
50 TABLET ORAL DAILY
Qty: 90 TABLET | Refills: 1 | Status: SHIPPED | OUTPATIENT
Start: 2024-01-03

## 2024-01-03 RX ORDER — ATORVASTATIN CALCIUM 10 MG/1
10 TABLET, FILM COATED ORAL NIGHTLY
Qty: 90 TABLET | Refills: 3 | Status: SHIPPED | OUTPATIENT
Start: 2024-01-03

## 2024-01-03 RX ORDER — ESCITALOPRAM OXALATE 5 MG/1
5 TABLET ORAL DAILY
Qty: 90 TABLET | Refills: 3 | Status: SHIPPED | OUTPATIENT
Start: 2024-01-03

## 2024-01-03 NOTE — PATIENT INSTRUCTIONS
Nutrition  Meet your nutrient needs primarily through nutrition by consuming foods and not just supplements  The Mediterranean diet and DASH (Dietary Approaches to Stop Hypertension) diet are proven diets to become healthier and lowering the risk of high blood pressure, some kinds of cancer, stroke, heart disease, heart failure, kidney stones, and diabetes. They are also effective at weight loss.  Macronutrient targets % total calories : Carbohydrates 50% (45-65%),  fat 30% (20-35%),  protein 20% (10-35%).   Emphasize vegetables, fruits, whole grains, nuts and seeds, beans and legumes, olive oil, and drink water. Small amounts of dairy, fish and seafood, and lean meat such as poultry. Occasional beef, pork, lamb, sweets and processed foods such as baked goods, chips, crackers, chocolate and candy.   Frozen vegetables and fruit are minimally processed , picked at its peak, convenient, and helps reduce food waste  Try low-sodium canned tomatoes, beans, and vegetables. You can also rinse in water to help remove some sodium.   Canned fruits packed in fruit juice is a better option than heavy syrup.   Recommend whole fruits over juice. Dried fruits are ok but have a higher sugar content.   Eat the rainbow. Vary your veggies with dark green, red and orange colors.   Make half your grains whole grains. Oatmeal brown rice, quinoa, farro, whole-grain pasta, whole-grain bread, and whole-grain tortillas.   Include a variety of protein sources such as lean meats, poultry, fish, seafood, beans, peas, nuts, seeds, eggs, soy   Move to low-fat or fat-free milk or yogurt. Limit cheese.   Other products sold as “milks” made from plants, such as rice, almond, coconut, and hemp “milks,” may contain calcium, but are not included in the dairy group because their overall nutrient content is not similar to dairy milk and fortified soy beverages   Use oils like canola, olive, and others instead of solid fats (like butter and stick  margarine, shortening, lard, and coconut oil)  Try grilling, broiling, roasting, or baking--they don't add extra fat  Added sugars include syrups and other sweeteners (brown sugar, corn sweetener, corn syrup, dextrose, fructose, glucose, high fructose corn syrup, honey, invert sugar, lactose, malt syrup, maltose, molasses, raw sugar, sucrose, trehalose, and turbinado sugar). When sugars are added to foods to bogdan them, they add calories without contributing essential nutrients  Cut calories by drinking water or unsweetened beverages. Soda, energy drinks, and sports drinks are a major source of added sugars  Water intake of 1.5L - 2L (50-70 ounces) per day  Daily fiber intake 20 g to 35 g per day  Soluble fiber pulls in water to slow solidify and slow loose, watery stools plus lower cholesterol. Examples are oats, peas, beans, apples, citrus fruits, carrots, barley and psyllium   Insoluble fiber is “roughage” to add bulk, make stool easier to pass and helps constipation. Examples are whole-wheat flour, wheat bran, nuts, beans and vegetables, such as cauliflower, green beans and potatoes.    Advance Care Planning and Advance Directives     You make decisions on a daily basis - decisions about where you want to live, your career, your home, your life. Perhaps one of the most important decisions you face is your choice for future medical care. Take time to talk with your family and your healthcare team and start planning today.  Advance Care Planning is a process that can help you:  Understand possible future healthcare decisions in light of your own experiences  Reflect on those decision in light of your goals and values  Discuss your decisions with those closest to you and the healthcare professionals that care for you  Make a plan by creating a document that reflects your wishes    Surrogate Decision Maker  In the event of a medical emergency, which has left you unable to communicate or to make your own decisions,  you would need someone to make decisions for you.  It is important to discuss your preferences for medical treatment with this person while you are in good health.     Qualities of a surrogate decision maker:  Willing to take on this role and responsibility  Knows what you want for future medical care  Willing to follow your wishes even if they don't agree with them  Able to make difficult medical decisions under stressful circumstances    Advance Directives  These are legal documents you can create that will guide your healthcare team and decision maker(s) when needed. These documents can be stored in the electronic medical record.    Living Will - a legal document to guide your care if you have a terminal condition or a serious illness and are unable to communicate. States vary by statute in document names/types, but most forms may include one or more of the following:        -  Directions regarding life-prolonging treatments        -  Directions regarding artificially provided nutrition/hydration        -  Choosing a healthcare decision maker        -  Direction regarding organ/tissue donation    Durable Power of  for Healthcare - this document names an -in-fact to make medical decisions for you, but it may also allow this person to make personal and financial decisions for you. Please seek the advice of an  if you need this type of document.    **Advance Directives are not required and no one may discriminate against you if you do not sign one.    Medical Orders  Many states allow specific forms/orders signed by your physician to record your wishes for medical treatment in your current state of health. This form, signed in personal communication with your physician, addresses resuscitation and other medical interventions that you may or may not want.      For more information or to schedule a time with a [facility] Advance Care Planning Facilitator contact: [facility] or  call [phone] and someone will contact you directly.    Medicare Wellness  Personal Prevention Plan of Service     Date of Office Visit:    Encounter Provider:  Vonda Barajas MD  Place of Service:  [facility]  Patient Name: Keshav Gu  :  1955    As part of the Medicare Wellness portion of your visit today, we are providing you with this personalized preventive plan of services (PPPS). This plan is based upon recommendations of the [place] Preventive Services Task Force (USPSTF) and the Advisory Committee on Immunization Practices (ACIP).    This lists the preventive care services that should be considered, and provides dates of when you are due. Items listed as completed are up-to-date and do not require any further intervention.    Health Maintenance   Topic Date Due    PAP SMEAR  2023    DXA SCAN  2023    ANNUAL WELLNESS VISIT  2023    MAMMOGRAM  08/10/2024    LIPID PANEL  2024    COLORECTAL CANCER SCREENING  2026    TDAP/TD VACCINES (3 - Td or Tdap) 2030    HEPATITIS C SCREENING  Completed    COVID-19 Vaccine  Completed    INFLUENZA VACCINE  Completed    Pneumococcal Vaccine 65+  Completed    ZOSTER VACCINE  Completed       No orders of the defined types were placed in this encounter.      No follow-ups on file.

## 2024-01-03 NOTE — PROGRESS NOTES
The ABCs of the Annual Wellness Visit  Subsequent Medicare Wellness Visit    Subjective    Keshav Gu is a 68 y.o. female who presents for a Subsequent Medicare Wellness Visit.    The following portions of the patient's history were reviewed and   updated as appropriate: allergies, current medications, past family history, past medical history, past social history, past surgical history, and problem list.    Compared to one year ago, the patient feels her physical   health is better.    Compared to one year ago, the patient feels her mental   health is the same.    Recent Hospitalizations:  She was not admitted to the hospital during the last year.       Current Medical Providers:  Patient Care Team:  Vonda Barajas MD as PCP - General (Family Medicine)  Priya Kramer MD as Consulting Physician (Dermatology)  Juana Adams MD as Consulting Physician (Obstetrics and Gynecology)  Edwin Jewell Jr., MD as Consulting Physician (Urology)  Gabriel Bae MD as Consulting Physician (Gastroenterology)    Outpatient Medications Prior to Visit   Medication Sig Dispense Refill    fluticasone (Flonase) 50 MCG/ACT nasal spray 2 sprays into the nostril(s) as directed by provider Daily. 1 bottle 11    levocetirizine (XYZAL) 5 MG tablet Take 1 tablet by mouth Daily.      multivitamin (THERAGRAN) tablet tablet Take  by mouth Daily.      VITAMIN D PO Take 2 each by mouth.      atorvastatin (LIPITOR) 10 MG tablet Take 1 tablet by mouth Every Night. 90 tablet 3    escitalopram (LEXAPRO) 5 MG tablet Take 1 tablet by mouth Daily. 90 tablet 3    losartan (COZAAR) 50 MG tablet Take 1 tablet by mouth Daily. 90 tablet 1     No facility-administered medications prior to visit.       No opioid medication identified on active medication list. I have reviewed chart for other potential  high risk medication/s and harmful drug interactions in the elderly.        Aspirin is not on active medication list.   "Aspirin use is not indicated based on review of current medical condition/s. Risk of harm outweighs potential benefits.  .    Patient Active Problem List   Diagnosis    Foot mass, left    Essential hypertension    Non-seasonal allergic rhinitis    Major depression in complete remission    Chronic interstitial cystitis    Dyslipidemia     Advance Care Planning   Advance Care Planning     Advance Directive is not on file.  ACP discussion was held with the patient during this visit. Patient does not have an advance directive, information provided.     Objective    Vitals:    24 0907   BP: 118/74   Pulse: 74   SpO2: 98%   Weight: 76.4 kg (168 lb 6.4 oz)   Height: 177.8 cm (70\")   PainSc: 0-No pain     Estimated body mass index is 24.16 kg/m² as calculated from the following:    Height as of this encounter: 177.8 cm (70\").    Weight as of this encounter: 76.4 kg (168 lb 6.4 oz).    BMI is within normal parameters. No other follow-up for BMI required.      Does the patient have evidence of cognitive impairment? No          HEALTH RISK ASSESSMENT    Smoking Status:  Social History     Tobacco Use   Smoking Status Never   Smokeless Tobacco Never     Alcohol Consumption:  Social History     Substance and Sexual Activity   Alcohol Use Yes    Alcohol/week: 1.0 standard drink of alcohol    Types: 1 Glasses of wine per week    Comment: Less than weekly, occaisonally     Fall Risk Screen:    STEADI Fall Risk Assessment was completed, and patient is at LOW risk for falls.Assessment completed on:1/3/2024    Depression Screenin/3/2024     9:13 AM   PHQ-2/PHQ-9 Depression Screening   Little Interest or Pleasure in Doing Things 0-->not at all   Feeling Down, Depressed or Hopeless 0-->not at all   PHQ-9: Brief Depression Severity Measure Score 0       Health Habits and Functional and Cognitive Screenin/3/2024     9:00 AM   Functional & Cognitive Status   Do you have difficulty preparing food and eating? No   Do " you have difficulty bathing yourself, getting dressed or grooming yourself? No   Do you have difficulty using the toilet? No   Do you have difficulty moving around from place to place? No   Do you have trouble with steps or getting out of a bed or a chair? No   Current Diet Well Balanced Diet   Dental Exam Up to date   Eye Exam Up to date   Exercise (times per week) 5 times per week   Current Exercises Include Dancing;Home Exercise Program (TV, Computer, Etc.);Pilates;Walking   Do you need help using the phone?  No   Are you deaf or do you have serious difficulty hearing?  No   Do you need help to go to places out of walking distance? No   Do you need help shopping? No   Do you need help preparing meals?  No   Do you need help with housework?  No   Do you need help with laundry? No   Do you need help taking your medications? No   Do you need help managing money? No   Do you ever drive or ride in a car without wearing a seat belt? No   Have you felt unusual stress, anger or loneliness in the last month? No   Who do you live with? Alone   If you need help, do you have trouble finding someone available to you? No   Have you been bothered in the last four weeks by sexual problems? No   Do you have difficulty concentrating, remembering or making decisions? No       Age-appropriate Screening Schedule:  Refer to the list below for future screening recommendations based on patient's age, sex and/or medical conditions. Orders for these recommended tests are listed in the plan section. The patient has been provided with a written plan.    Health Maintenance   Topic Date Due    PAP SMEAR  03/06/2023    DXA SCAN  03/19/2023    MAMMOGRAM  08/10/2024    LIPID PANEL  09/08/2024    ANNUAL WELLNESS VISIT  01/03/2025    COLORECTAL CANCER SCREENING  04/07/2026    TDAP/TD VACCINES (3 - Td or Tdap) 09/17/2030    HEPATITIS C SCREENING  Completed    COVID-19 Vaccine  Completed    INFLUENZA VACCINE  Completed    Pneumococcal Vaccine 65+   Completed    ZOSTER VACCINE  Completed                Immunization History   Administered Date(s) Administered    COVID-19 (MODERNA) BIVALENT 12+YRS 10/30/2022    COVID-19 (PFIZER) BIVALENT 12+YRS 06/22/2023    COVID-19 (PFIZER) Purple Cap Monovalent 02/13/2021, 03/06/2021, 10/04/2021    COVID-19 F23 (MODERNA) 12YRS+ (SPIKEVAX) 09/22/2023    Covid-19 (Pfizer) Gray Cap Monovalent 08/04/2022    Fluad Quad 65+ 10/24/2021, 10/05/2022, 10/30/2023    Fluzone (or Fluarix & Flulaval for VFC) >6mos 10/22/2019, 09/17/2020    Fluzone Quad >6mos (Multi-dose) 11/20/2015, 11/15/2016, 10/17/2017    Hepatitis A 10/16/2018, 04/16/2019    Influenza Quad Vaccine (Inpatient) 11/19/2010    Pneumococcal Conjugate 20-Valent (PCV20) 12/22/2022    Pneumococcal Polysaccharide (PPSV23) 12/14/2021    Shingrix 04/01/2022, 10/17/2022    Tdap 06/08/2009, 09/17/2020       CMS Preventative Services Quick Reference  Risk Factors Identified During Encounter  Depression/Dysphoria: Current medication is effective, no change recommended  Immunizations Discussed/Encouraged: RSV (Respiratory Syncytial Virus)  The above risks/problems have been discussed with the patient.  Pertinent information has been shared with the patient in the After Visit Summary.  An After Visit Summary and PPPS were made available to the patient.    Follow Up:   Next Medicare Wellness visit to be scheduled in 1 year.       Additional E&M Note during same encounter follows:  Patient has multiple medical problems which are significant and separately identifiable that require additional work above and beyond the Medicare Wellness Visit.      Chief Complaint  Medicare Wellness-subsequent and Annual Exam    Subjective        HPI  Keshav Gu is also being seen today for physical, HTN, hyperlipidemia, depression      Abdominal pain resolved. Bowel movements are fine.She took probiotic. Thought maybe it was gas.     She has a lung nodule on CT scan. Last year chest xray.     She  "has coffee in the morning. 5 glasses of water per day.          Objective   Vital Signs:  /74   Pulse 74   Ht 177.8 cm (70\")   Wt 76.4 kg (168 lb 6.4 oz)   SpO2 98%   BMI 24.16 kg/m²     Physical Exam  Constitutional:       General: She is not in acute distress.  HENT:      Right Ear: Tympanic membrane and ear canal normal.      Left Ear: Tympanic membrane and ear canal normal.   Eyes:      General:         Right eye: No discharge.         Left eye: No discharge.      Conjunctiva/sclera: Conjunctivae normal.   Neck:      Thyroid: No thyromegaly.   Cardiovascular:      Rate and Rhythm: Normal rate and regular rhythm.   Pulmonary:      Effort: Pulmonary effort is normal.      Breath sounds: Normal breath sounds.   Abdominal:      Palpations: Abdomen is soft. There is no hepatomegaly.      Tenderness: There is no abdominal tenderness.   Musculoskeletal:      Cervical back: Neck supple.      Right lower leg: No edema.      Left lower leg: No edema.   Lymphadenopathy:      Head:      Right side of head: No submandibular, preauricular or posterior auricular adenopathy.      Left side of head: No submandibular, preauricular or posterior auricular adenopathy.      Cervical: No cervical adenopathy.   Skin:     General: Skin is warm.   Neurological:      Mental Status: She is alert and oriented to person, place, and time.   Psychiatric:         Mood and Affect: Mood normal.         Behavior: Behavior normal.         Thought Content: Thought content normal.         Judgment: Judgment normal.          The following data was reviewed by: Vonda Barajas MD on 01/03/2024:  Common labs          5/10/2023    09:57 8/17/2023    18:47 9/8/2023    10:19   Common Labs   Glucose 94      BUN 20      Creatinine 0.93  0.70     Sodium 143      Potassium 4.3      Chloride 105      Calcium 10.1      Total Protein 7.1      Albumin 4.7      Total Bilirubin 0.5      Alkaline Phosphatase 59      AST (SGOT) 22      ALT (SGPT) 22    "   Total Cholesterol 236   158    Triglycerides 59   40    HDL Cholesterol 79   75    LDL Cholesterol  147   74          CT Abdomen Pelvis With & Without Contrast (08/17/2023 18:50)        Assessment and Plan   Diagnoses and all orders for this visit:    1. Medicare annual wellness visit, subsequent (Primary)  Counseling/anticipatory guidance: Nutrition,  mental health, immunizations, screenings  Check fasting labs today.  Breast cancer screening mammogram up-to-date.  Colon cancer screening colonoscopy up-to-date.  Continue well woman care with OB/GYN for Pap smear monitoring.  2. Encounter for screening for osteoporosis  -     DEXA Bone Density, Axial (Hospital); Future  Last bone density 2021 with lowest T-score -2.2.  3. Menopause  -     DEXA Bone Density, Axial (Hospital); Future    4. Dyslipidemia  -     atorvastatin (LIPITOR) 10 MG tablet; Take 1 tablet by mouth Every Night.  Dispense: 90 tablet; Refill: 3  -     Comprehensive Metabolic Panel; Future  -     Lipid Panel; Future  -     Apolipoprotein B; Future  -     High Sensitivity CRP; Future  Stable.  Continue statin.  Patient also requested additional lab of APO B and CRP.  5. Major depression in complete remission  -     escitalopram (LEXAPRO) 5 MG tablet; Take 1 tablet by mouth Daily.  Dispense: 90 tablet; Refill: 3  Stable.  I recommend patient stay on the medication through the winter but she may reevaluate in the spring or summer stopping the medication.  She is on a low-dose and I do not expect her to need a taper.  6. Essential hypertension  -     losartan (COZAAR) 50 MG tablet; Take 1 tablet by mouth Daily.  Dispense: 90 tablet; Refill: 1  -     Comprehensive Metabolic Panel; Future  -     Lipid Panel; Future  -     TSH Rfx On Abnormal To Free T4; Future  Stable.  Continue losartan.  Recheck in 6 months.  7. Screening for deficiency anemia  -     CBC (No Diff); Future    8. Incidental lung nodule, > 3mm and < 8mm  -     CT Chest Low Dose Follow Up  Without Contrast; Future  CT of abdomen pelvis showed 5 mm right lower lobe lung nodule.  Dedicated CT of the chest in the spring to check for changes.         Follow Up   Return in about 6 months (around 7/3/2024) for Office visit HTN AND , MWV and fasting labs 1 year.  Patient was given instructions and counseling regarding her condition or for health maintenance advice. Please see specific information pulled into the AVS if appropriate.       Electronically signed by Vonda Barajas MD, 01/03/24, 9:35 AM EST.

## 2024-01-06 LAB
ALBUMIN SERPL-MCNC: 4.6 G/DL (ref 3.9–4.9)
ALBUMIN/GLOB SERPL: 2.3 {RATIO} (ref 1.2–2.2)
ALP SERPL-CCNC: 52 IU/L (ref 44–121)
ALT SERPL-CCNC: 19 IU/L (ref 0–32)
APO B SERPL-MCNC: 64 MG/DL
AST SERPL-CCNC: 21 IU/L (ref 0–40)
BILIRUB SERPL-MCNC: 0.4 MG/DL (ref 0–1.2)
BUN SERPL-MCNC: 17 MG/DL (ref 8–27)
BUN/CREAT SERPL: 22 (ref 12–28)
CALCIUM SERPL-MCNC: 9.3 MG/DL (ref 8.7–10.3)
CHLORIDE SERPL-SCNC: 105 MMOL/L (ref 96–106)
CHOLEST SERPL-MCNC: 172 MG/DL (ref 100–199)
CO2 SERPL-SCNC: 25 MMOL/L (ref 20–29)
CREAT SERPL-MCNC: 0.77 MG/DL (ref 0.57–1)
CRP SERPL HS-MCNC: 1.11 MG/L (ref 0–3)
EGFRCR SERPLBLD CKD-EPI 2021: 84 ML/MIN/1.73
ERYTHROCYTE [DISTWIDTH] IN BLOOD BY AUTOMATED COUNT: 12.5 % (ref 11.7–15.4)
GLOBULIN SER CALC-MCNC: 2 G/DL (ref 1.5–4.5)
GLUCOSE SERPL-MCNC: 105 MG/DL (ref 70–99)
HCT VFR BLD AUTO: 40.4 % (ref 34–46.6)
HDLC SERPL-MCNC: 77 MG/DL
HGB BLD-MCNC: 13.2 G/DL (ref 11.1–15.9)
LDLC SERPL CALC-MCNC: 84 MG/DL (ref 0–99)
MCH RBC QN AUTO: 28.7 PG (ref 26.6–33)
MCHC RBC AUTO-ENTMCNC: 32.7 G/DL (ref 31.5–35.7)
MCV RBC AUTO: 88 FL (ref 79–97)
PLATELET # BLD AUTO: 277 X10E3/UL (ref 150–450)
POTASSIUM SERPL-SCNC: 4.2 MMOL/L (ref 3.5–5.2)
PROT SERPL-MCNC: 6.6 G/DL (ref 6–8.5)
RBC # BLD AUTO: 4.6 X10E6/UL (ref 3.77–5.28)
SODIUM SERPL-SCNC: 142 MMOL/L (ref 134–144)
TRIGL SERPL-MCNC: 52 MG/DL (ref 0–149)
TSH SERPL DL<=0.005 MIU/L-ACNC: 3.22 UIU/ML (ref 0.45–4.5)
VLDLC SERPL CALC-MCNC: 11 MG/DL (ref 5–40)
WBC # BLD AUTO: 5 X10E3/UL (ref 3.4–10.8)

## 2024-01-08 ENCOUNTER — PATIENT MESSAGE (OUTPATIENT)
Age: 69
End: 2024-01-08
Payer: MEDICARE

## 2024-01-08 PROBLEM — R73.01 ELEVATED FASTING GLUCOSE: Status: ACTIVE | Noted: 2024-01-03

## 2024-01-08 PROBLEM — R73.01 ELEVATED FASTING GLUCOSE: Status: ACTIVE | Noted: 2024-01-05

## 2024-02-14 ENCOUNTER — HOSPITAL ENCOUNTER (OUTPATIENT)
Dept: BONE DENSITY | Facility: HOSPITAL | Age: 69
Discharge: HOME | End: 2024-02-14
Admitting: FAMILY MEDICINE
Payer: MEDICARE

## 2024-02-14 DIAGNOSIS — Z13.820 ENCOUNTER FOR SCREENING FOR OSTEOPOROSIS: ICD-10-CM

## 2024-02-14 DIAGNOSIS — Z78.0 MENOPAUSE: ICD-10-CM

## 2024-02-14 PROCEDURE — 77080 DXA BONE DENSITY AXIAL: CPT

## 2024-03-01 ENCOUNTER — HOSPITAL ENCOUNTER (OUTPATIENT)
Dept: CT IMAGING | Facility: HOSPITAL | Age: 69
Discharge: HOME | End: 2024-03-01
Payer: MEDICARE

## 2024-03-01 DIAGNOSIS — R91.1 INCIDENTAL LUNG NODULE, > 3MM AND < 8MM: ICD-10-CM

## 2024-03-01 PROCEDURE — 71250 CT THORAX DX C-: CPT

## 2024-03-04 ENCOUNTER — PATIENT MESSAGE (OUTPATIENT)
Age: 69
End: 2024-03-04
Payer: MEDICARE

## 2024-03-26 ENCOUNTER — OFFICE VISIT (OUTPATIENT)
Dept: CARDIOLOGY | Facility: CLINIC | Age: 69
End: 2024-03-26
Payer: MEDICARE

## 2024-03-26 VITALS
BODY MASS INDEX: 23.71 KG/M2 | HEIGHT: 70 IN | DIASTOLIC BLOOD PRESSURE: 84 MMHG | WEIGHT: 165.6 LBS | HEART RATE: 86 BPM | SYSTOLIC BLOOD PRESSURE: 126 MMHG | OXYGEN SATURATION: 99 %

## 2024-03-26 DIAGNOSIS — E78.5 DYSLIPIDEMIA: ICD-10-CM

## 2024-03-26 DIAGNOSIS — I10 ESSENTIAL HYPERTENSION: Chronic | ICD-10-CM

## 2024-03-26 DIAGNOSIS — I77.810 AORTIC ECTASIA, THORACIC: Primary | ICD-10-CM

## 2024-03-26 PROCEDURE — 3074F SYST BP LT 130 MM HG: CPT | Performed by: INTERNAL MEDICINE

## 2024-03-26 PROCEDURE — 3079F DIAST BP 80-89 MM HG: CPT | Performed by: INTERNAL MEDICINE

## 2024-03-26 PROCEDURE — 99204 OFFICE O/P NEW MOD 45 MIN: CPT | Performed by: INTERNAL MEDICINE

## 2024-03-26 RX ORDER — LORATADINE 10 MG/1
CAPSULE, LIQUID FILLED ORAL AS NEEDED
COMMUNITY

## 2024-03-26 NOTE — PROGRESS NOTES
[facility] Cardiology  Consultation H&P  Keshav Gu  1955  [phone]  There is no work phone number on file..    VISIT DATE:  03/26/2024    PCP: Vonda Barajas MD  [address]  [facility] [address]    CC:  Chief Complaint   Patient presents with    Establish Care     NEW PATIENT       Previous cardiac studies and procedures:  May 2023 Holter, 7-day    A relatively benign monitor study.    Short SVT runs.  Longest lasting 13 beats    PACs less than 1%.    August 2023 CT abdomen pelvis: Normal abdominal aorta    March 2024 CT chest  Minimal aortic atherosclerotic calcifications are present. The ascending aorta is ectatic measuring 3.8 cm.     ASSESSMENT:   Diagnosis Plan   1. Aortic ectasia, thoracic        2. Dyslipidemia        3. Essential hypertension              PLAN:  Aortic ectasia: 3.8 cm.  Currently not recommending any physical restrictions.  Recommend surveillance imaging with CT chest with and without contrast in 1 year.  Continue current afterload reduction.    Hypertension: Goal less than 130/80 mmHg.  Ideally less than 120/80 mmHg.  Continue current medical therapy.  Will add beta-blockade if there is interval progression of aortic dilatation or if she has worsening palpitations.    Palpitations: Secondary to supraventricular ectopy.  Not significantly affecting quality of life.  Limited burden of arrhythmia.  Offered reassurance today.  Encouraged regular exercise and avoidance of triggers.    Dyslipidemia: Goal LDL is 100.  Agree with current medical therapy.    History of Present Illness   69-year-old female with history of intermittent palpitations, hypertension and dyslipidemia.  Recent incidental finding of aortic ectasia measuring 3.8 cm on CT scan obtained to follow-up pulmonary nodules.  She denies chest discomfort, dyspnea.  Intermittent brief palpitations which tend to match up with supraventricular ectopy, can be triggered by  "stress and anxiety.  She exercise on a regular basis without limitations.  Blood pressures running less than 130/80 mmHg.  She is compliant with medical therapy.  No first-degree relatives with history of aneurysmal disease.    PHYSICAL EXAMINATION:  Vitals:    03/26/24 0949   Weight: 75.1 kg (165 lb 9.6 oz)   Height: 177.8 cm (70\")     General Appearance:    Alert, cooperative, no distress, appears stated age   Head:    Normocephalic, without obvious abnormality, atraumatic   Eyes:    conjunctiva/corneas clear, EOM's intact, fundi     benign, both eyes   Ears:    Normal TM's and external ear canals, both ears   Nose:   Nares normal, septum midline, mucosa normal, no drainage    or sinus tenderness   Throat:   Lips, mucosa, and tongue normal; teeth and gums normal   Neck:   Supple, symmetrical, trachea midline, no adenopathy;     thyroid:  no enlargement/tenderness/nodules; no carotid    bruit or JVD   Back:     Symmetric, no curvature, ROM normal, no CVA tenderness   Lungs:     Clear to auscultation bilaterally, respirations unlabored   Chest Wall:    No tenderness or deformity    Heart:    Regular rate and rhythm, S1 and S2 normal, no murmur, rub   or gallop, normal carotid impulse bilaterally without bruit.   Abdomen:     Soft, non-tender, bowel sounds active all four quadrants,     no masses, no organomegaly   Extremities:   Extremities normal, atraumatic, no cyanosis or edema   Pulses:   2+ and symmetric all extremities   Skin:   Skin color, texture, turgor normal, no rashes or lesions   Lymph nodes:   Cervical, supraclavicular, and axillary nodes normal   Neurologic:   normal strength, sensation intact     throughout       Diagnostic Data:  Procedures  Lab Results   Component Value Date    CHLPL 172 01/03/2024    TRIG 52 01/03/2024    HDL 77 01/03/2024     Lab Results   Component Value Date    GLUCOSE 105 (H) 01/03/2024    BUN 17 01/03/2024    CREATININE 0.77 01/03/2024     01/03/2024    K 4.2 01/03/2024 " "    01/03/2024    CO2 25 01/03/2024     No results found for: \"HGBA1C\"  Lab Results   Component Value Date    WBC 5.0 01/03/2024    HGB 13.2 01/03/2024    HCT 40.4 01/03/2024     01/03/2024       PROBLEM LIST:  Patient Active Problem List   Diagnosis    Foot mass, left    Essential hypertension    Non-seasonal allergic rhinitis    Major depression in complete remission    Chronic interstitial cystitis    Dyslipidemia    Elevated fasting glucose    Aortic ectasia, thoracic       PAST MEDICAL HX  Past Medical History:   Diagnosis Date    Allergic 40 yrs ago    dust    Arrhythmia 4/29/23    Basal cell carcinoma     right cheek, left chest, left arm    Broken arm     left    Colon polyp 12/01/2014    COVID-19 05/2022    Depression     Elevated hemoglobin A1c 01/17/2017    5.7    Frequent UTI     related to sexual activity    Hyperlipidemia     Hypertension 01/17/2017    Interstitial cystitis     Migraine headache 01/31/2014    Mononucleosis     Osteopenia     Plantar fasciitis 06/08/2009    Scoliosis of thoracic spine 2018    xrays    T8 vertebral fracture 2010    Vitamin D deficiency 11/25/2015       Allergies  Allergies   Allergen Reactions    Macrobid [Nitrofurantoin Macrocrystal] Itching and Swelling       Current Medications    Current Outpatient Medications:     atorvastatin (LIPITOR) 10 MG tablet, Take 1 tablet by mouth Every Night., Disp: 90 tablet, Rfl: 3    escitalopram (LEXAPRO) 5 MG tablet, Take 1 tablet by mouth Daily., Disp: 90 tablet, Rfl: 3    fluticasone (Flonase) 50 MCG/ACT nasal spray, 2 sprays into the nostril(s) as directed by provider Daily., Disp: 1 bottle, Rfl: 11    Loratadine (Claritin) 10 MG capsule, Take  by mouth As Needed., Disp: , Rfl:     losartan (COZAAR) 50 MG tablet, Take 1 tablet by mouth Daily., Disp: 90 tablet, Rfl: 1    multivitamin (THERAGRAN) tablet tablet, Take  by mouth Daily., Disp: , Rfl:     VITAMIN D PO, Take 1 capsule by mouth Daily., Disp: , Rfl:  "         ROS  ROS      SOCIAL HX  Social History     Socioeconomic History    Marital status:    Tobacco Use    Smoking status: Never    Smokeless tobacco: Never   Vaping Use    Vaping status: Never Used   Substance and Sexual Activity    Alcohol use: Yes     Alcohol/week: 1.0 standard drink of alcohol     Types: 1 Glasses of wine per week     Comment: Less than weekly, occaisonally    Drug use: No    Sexual activity: Not Currently     Partners: Male     Birth control/protection: Abstinence       FAMILY HX  Family History   Problem Relation Age of Onset    Hyperlipidemia Mother     Heart attack Mother     Colon cancer Mother     Mental illness Mother         OCD    Arthritis Mother     Heart disease Mother         HCM    Cancer Mother         Not definitely diagnosed probably colon    Stomach cancer Father     Hypertension Father     Cancer Father         Stomach cancer    Stroke Maternal Grandmother     Hypertension Maternal Grandmother         Stroke    Stroke Maternal Grandfather     Mental illness Maternal Grandfather     Hypertension Maternal Grandfather         Stroke    Stroke Paternal Grandmother     Hypertension Paternal Grandmother         Stroke    Stroke Paternal Grandfather     Hypertension Paternal Grandfather         Stroke    Atrial fibrillation Other     Breast cancer Neg Hx     Ovarian cancer Neg Hx              Samir Watkins III, MD, FACC

## 2024-04-30 ENCOUNTER — HOSPITAL ENCOUNTER (OUTPATIENT)
Dept: GENERAL RADIOLOGY | Facility: HOSPITAL | Age: 69
Discharge: HOME | End: 2024-04-30
Admitting: FAMILY MEDICINE
Payer: MEDICARE

## 2024-04-30 ENCOUNTER — OFFICE VISIT (OUTPATIENT)
Age: 69
End: 2024-04-30
Payer: MEDICARE

## 2024-04-30 VITALS
HEART RATE: 77 BPM | DIASTOLIC BLOOD PRESSURE: 82 MMHG | OXYGEN SATURATION: 93 % | WEIGHT: 174.1 LBS | SYSTOLIC BLOOD PRESSURE: 124 MMHG | HEIGHT: 70 IN | BODY MASS INDEX: 24.92 KG/M2

## 2024-04-30 DIAGNOSIS — M79.645 PAIN OF FINGER OF LEFT HAND: ICD-10-CM

## 2024-04-30 DIAGNOSIS — M79.642 PAIN OF LEFT HAND: Primary | ICD-10-CM

## 2024-04-30 DIAGNOSIS — M79.642 PAIN OF LEFT HAND: ICD-10-CM

## 2024-04-30 PROCEDURE — 99213 OFFICE O/P EST LOW 20 MIN: CPT | Performed by: FAMILY MEDICINE

## 2024-04-30 PROCEDURE — 3079F DIAST BP 80-89 MM HG: CPT | Performed by: FAMILY MEDICINE

## 2024-04-30 PROCEDURE — 73130 X-RAY EXAM OF HAND: CPT

## 2024-04-30 PROCEDURE — 3074F SYST BP LT 130 MM HG: CPT | Performed by: FAMILY MEDICINE

## 2024-04-30 PROCEDURE — 1159F MED LIST DOCD IN RCRD: CPT | Performed by: FAMILY MEDICINE

## 2024-04-30 PROCEDURE — 1160F RVW MEDS BY RX/DR IN RCRD: CPT | Performed by: FAMILY MEDICINE

## 2024-04-30 NOTE — PROGRESS NOTES
"Chief Complaint  Hand Pain (Left/Happened about 1 month ago has got better but still swollen )    Subjective        Keshav Gu presents to [facility]  Hand Pain   The injury mechanism was a fall. The pain is present in the left hand. Quality: sharp. The pain is at a severity of 4/10. The pain has been Intermittent since the incident. Associated symptoms comments: swelling. She has tried nothing for the symptoms.     One month ago dog pushed her and she fell into furniture with left hand. Bruising. Pain increased with pressure at pinky and side of hand. She can't bring pinky all the way to make a fist.     Objective   Vital Signs:  /82   Pulse 77   Ht 177.8 cm (70\")   Wt 79 kg (174 lb 1.6 oz)   SpO2 93%   BMI 24.98 kg/m²   Estimated body mass index is 24.98 kg/m² as calculated from the following:    Height as of this encounter: 177.8 cm (70\").    Weight as of this encounter: 79 kg (174 lb 1.6 oz).       BMI is within normal parameters. No other follow-up for BMI required.      Physical Exam  Constitutional:       General: She is not in acute distress.     Appearance: She is not ill-appearing, toxic-appearing or diaphoretic.   Pulmonary:      Effort: No respiratory distress.   Musculoskeletal:        Hands:       Comments: Left fifth finger swelling and deviation to ulnar aspect of hand.  Unable to close fifth finger in a fist fully.  Flexion 4/5 strength   Psychiatric:         Mood and Affect: Mood normal.        Result Review :                     Assessment and Plan     Diagnoses and all orders for this visit:    1. Pain of left hand (Primary)  -     XR Hand 2 View Left; Future    2. Pain of finger of left hand  -     XR Finger 2+ View Left; Future      X-rays today to check for fracture.  If no fracture detected then we will refer to hand physical therapy.  Refer to orthopedics if fracture or if worsening pain with physical therapy.         Follow Up     Return " if symptoms worsen or fail to improve.  Patient was given instructions and counseling regarding her condition or for health maintenance advice. Please see specific information pulled into the AVS if appropriate.     Electronically signed by Vonda Barajas MD, 04/30/24, 2:08 PM EDT.

## 2024-05-01 DIAGNOSIS — S62.617D CLOSED DISPLACED FRACTURE OF PROXIMAL PHALANX OF LEFT LITTLE FINGER WITH ROUTINE HEALING, SUBSEQUENT ENCOUNTER: Primary | ICD-10-CM

## 2024-05-07 ENCOUNTER — OFFICE VISIT (OUTPATIENT)
Age: 69
End: 2024-05-07
Payer: MEDICARE

## 2024-05-07 VITALS
WEIGHT: 170.6 LBS | BODY MASS INDEX: 23.88 KG/M2 | HEIGHT: 71 IN | SYSTOLIC BLOOD PRESSURE: 137 MMHG | DIASTOLIC BLOOD PRESSURE: 92 MMHG

## 2024-05-07 DIAGNOSIS — S62.609D CLOSED FRACTURE OF PHALANX OF DIGIT OF HAND WITH ROUTINE HEALING, SUBSEQUENT ENCOUNTER: Primary | ICD-10-CM

## 2024-05-07 PROBLEM — S62.609A FX PHALANGES, HAND-CLOSED: Status: ACTIVE | Noted: 2024-05-07

## 2024-05-07 PROCEDURE — 3075F SYST BP GE 130 - 139MM HG: CPT | Performed by: STUDENT IN AN ORGANIZED HEALTH CARE EDUCATION/TRAINING PROGRAM

## 2024-05-07 PROCEDURE — 99204 OFFICE O/P NEW MOD 45 MIN: CPT | Performed by: STUDENT IN AN ORGANIZED HEALTH CARE EDUCATION/TRAINING PROGRAM

## 2024-05-07 PROCEDURE — 1159F MED LIST DOCD IN RCRD: CPT | Performed by: STUDENT IN AN ORGANIZED HEALTH CARE EDUCATION/TRAINING PROGRAM

## 2024-05-07 PROCEDURE — 1160F RVW MEDS BY RX/DR IN RCRD: CPT | Performed by: STUDENT IN AN ORGANIZED HEALTH CARE EDUCATION/TRAINING PROGRAM

## 2024-05-07 PROCEDURE — 3080F DIAST BP >= 90 MM HG: CPT | Performed by: STUDENT IN AN ORGANIZED HEALTH CARE EDUCATION/TRAINING PROGRAM

## 2024-05-28 ENCOUNTER — TREATMENT (OUTPATIENT)
Dept: PHYSICAL THERAPY | Facility: CLINIC | Age: 69
End: 2024-05-28
Payer: MEDICARE

## 2024-05-28 DIAGNOSIS — S62.647A CLOSED NONDISPLACED FRACTURE OF PROXIMAL PHALANX OF LEFT LITTLE FINGER, INITIAL ENCOUNTER: Primary | ICD-10-CM

## 2024-05-28 PROCEDURE — 97110 THERAPEUTIC EXERCISES: CPT | Performed by: PHYSICAL THERAPIST

## 2024-05-28 PROCEDURE — 97161 PT EVAL LOW COMPLEX 20 MIN: CPT | Performed by: PHYSICAL THERAPIST

## 2024-05-28 PROCEDURE — L3927 FO PIP DIP NO JT SPR PRE OTS: HCPCS | Performed by: PHYSICAL THERAPIST

## 2024-05-28 NOTE — PROGRESS NOTES
Physical Therapy Initial Evaluation and Plan of Care  [facility] [place] [place]  [address] [address]        Patient: Keshav Gu   : 1955  Diagnosis/ICD-10 Code:  Closed nondisplaced fracture of proximal phalanx of left little finger, initial encounter [S62.647A]  Referring practitioner: Sunita Perdomo MD  Date of Initial Visit: 2024  Today's Date: 2024  Patient seen for 6 sessions         Visit Diagnoses:    ICD-10-CM ICD-9-CM   1. Closed nondisplaced fracture of proximal phalanx of left little finger, initial encounter  S62.647A 816.01         Subjective Questionnaire: QuickDASH: 4.55%    Subjective Evaluation    History of Present Illness  Date of onset: 3/29/2024  Mechanism of injury: Her dog ran into her. Her hand hit furniture.  Resulted in a left small finger P1 Fracture.      Patient Occupation: Retired Pain  Location: Left small finger is stiff, especially in a.m.  Does have pain with hard contact.  Quality: tight, pulling and discomfort  Progression: improved    Hand dominance: right           Objective          Observations     Left Wrist/Hand   Positive for deformity and edema.       Neurological Testing     Sensation     Wrist/Hand   Left   Intact: light touch and pin prick    Active Range of Motion     Left Wrist   Normal active range of motion    Left Digits    Flexion   Index     MCP: 78 degrees    PIP: 72 degrees    DIP: 50 degrees  Extension   Index     MCP: 0 degrees    PIP: 21 degrees    DIP: 25 degrees    Strength/Myotome Testing     Left Wrist/Hand      (2nd hand position)   Left  strength (2nd hand position) 42 lbs    Right Wrist/Hand      (2nd hand position)   Right  strength (2nd hand position) 70 lbs          Assessment & Plan       Assessment  Impairments: abnormal or restricted ROM, activity intolerance, impaired physical strength and pain with function   Functional limitations: carrying objects, lifting,  pulling, pushing, uncomfortable because of pain and unable to perform repetitive tasks   Assessment details: Pt. is a 69 year old female who has experienced displaced fracture to her proximal phalanx and MCP joint of the small finger on 4/3/2024.  No surgical repair performed.  Neurological exam is normal today.  He has symptoms of MCP/PIP/DIP joint capsulitis and joint derangement.  Problems:  discomfort, decreased strength, decreased ROM of fingers, edema, and decrease functional activities.    Prognosis: good    Goals  Plan Goals: STG to be Met in 3 weeks  1. Resolve soft tissue tenderness.   2. Pt. has at least 50 lbs. of  on the left.  3. Pt. reports decrease in finger pain by 50%.  4. Pt. has 10 degrees of small finger PIP extension.  5. Pt has 100 degrees of small finger PIP flexion.  LTG to be met in 6 weeks.  1. Pt. Independent with HEP.  2. Pt. returns to normal activities at home/work without complications.  3. Pt. able to  60 lbs.  4.  Small finger ROM is WNL  5. QUICK DASH score improves to 2% or less.    Plan  Therapy options: will be seen for skilled therapy services  Planned modality interventions: fluidotherapy, high voltage pulsed current (pain management), ultrasound, iontophoresis and thermotherapy (paraffin bath)  Planned therapy interventions: compression, fine motor coordination training, functional ROM exercises, home exercise program, joint mobilization, manual therapy, orthotic fitting/training, soft tissue mobilization, strengthening, stretching and therapeutic activities  Frequency: 2x month  Duration in weeks: 8        Access Code: [email or web address]  URL: [email or web address]  Date: 05/28/2024  Prepared by: Parker Mar    Exercises  - Seated Wrist Flexor Hook Fist Tendon Gliding  - 5 x daily - 7 x weekly - 1 sets - 10 reps  - Seated Straight Fist AROM  - 5 x daily - 7 x weekly - 1 sets - 10 reps  - Tendon Gliding Fist Series: Neutral Hand Position Progression  - 5 x daily - 7 x  weekly - 1 sets - 10 reps  - Seated Finger DIP Flexion AROM with Blocking  - 5 x daily - 7 x weekly - 1 sets - 10 reps  - Hand AROM PIP Blocking  - 5 x daily - 7 x weekly - 1 sets - 10 reps  Timed:              Therapeutic Exercise:    16     mins  [id number];         Un-Timed:  Splint fitting     10 min    ;  Low Eval     15     Mins  [id number]          Timed Treatment:   16   mins   Total Treatment:     41   mins          PT: Parker Mar PT, T     License Number: [id number]  Electronically signed by Parker Mar PT, 05/28/24, 4:44 PM EDT    Medicare Initial Certification  Certification Period: 8/26/2024  I certify that the therapy services are furnished while this patient is under my care.  The services outlined above are required by this patient, and will be reviewed every 90 days.     PHYSICIAN: ________________________________________________________  Sunita Perdomo MD        DATE: ____________________________________________________________    Please sign and return via fax to [phone].. Thank you, [facility].

## 2024-06-18 ENCOUNTER — OFFICE VISIT (OUTPATIENT)
Age: 69
End: 2024-06-18
Payer: MEDICARE

## 2024-06-18 VITALS
DIASTOLIC BLOOD PRESSURE: 76 MMHG | BODY MASS INDEX: 23.87 KG/M2 | WEIGHT: 170.5 LBS | SYSTOLIC BLOOD PRESSURE: 124 MMHG | HEIGHT: 71 IN

## 2024-06-18 DIAGNOSIS — S62.609D CLOSED FRACTURE OF PHALANX OF DIGIT OF HAND WITH ROUTINE HEALING, SUBSEQUENT ENCOUNTER: Primary | ICD-10-CM

## 2024-06-18 PROCEDURE — 3078F DIAST BP <80 MM HG: CPT | Performed by: STUDENT IN AN ORGANIZED HEALTH CARE EDUCATION/TRAINING PROGRAM

## 2024-06-18 PROCEDURE — 99213 OFFICE O/P EST LOW 20 MIN: CPT | Performed by: STUDENT IN AN ORGANIZED HEALTH CARE EDUCATION/TRAINING PROGRAM

## 2024-06-18 PROCEDURE — 1159F MED LIST DOCD IN RCRD: CPT | Performed by: STUDENT IN AN ORGANIZED HEALTH CARE EDUCATION/TRAINING PROGRAM

## 2024-06-18 PROCEDURE — 3074F SYST BP LT 130 MM HG: CPT | Performed by: STUDENT IN AN ORGANIZED HEALTH CARE EDUCATION/TRAINING PROGRAM

## 2024-06-18 PROCEDURE — 1160F RVW MEDS BY RX/DR IN RCRD: CPT | Performed by: STUDENT IN AN ORGANIZED HEALTH CARE EDUCATION/TRAINING PROGRAM

## 2024-06-18 NOTE — PROGRESS NOTES
[facility]     Office Visit       Date: 06/18/2024   Patient Name: Keshav Gu  MRN: [id number]  YOB: 1955    Referring Physician: Vonda Barajas MD     Chief Complaint:   Chief Complaint   Patient presents with    Follow-up     6 week follow up; Closed fracture of phalanx of digit of hand with routine healing (DOI:3/29/24)     History of Present Illness:   Keshav Gu is a 69 y.o. female right-hand-dominant presented to clinic for follow-up of left small finger proximal phalanx base intra-articular fracture, DOI 3/29/2024.  Formal hand therapy was initiated at her last clinic visit she is prescribed a home exercise program.  She reports doing this daily.  She has noted improvements in her overall stiffness.  She states the finger is not painful to her.  She overall does feel improved.  No reinjury.  No other complaints or concerns.    Subjective   Review of Systems:   Review of Systems   Constitutional: Negative.    HENT: Negative.     Eyes: Negative.    Respiratory: Negative.     Cardiovascular: Negative.    Gastrointestinal: Negative.    Endocrine: Negative.    Genitourinary: Negative.    Musculoskeletal:  Positive for arthralgias.   Skin: Negative.    Allergic/Immunologic: Negative.    Neurological: Negative.    Hematological: Negative.    Psychiatric/Behavioral: Negative.        Pertinent review of systems per HPI    I reviewed the patient's chief complaint, history of present illness, review of systems, past medical history, surgical history, family history, social history, medications and allergy list in the EMR on 06/18/2024 and agree with the findings above.    Objective    Quality Measures:   ACP:   ACP discussion was declined by the patient.      Tobacco:   Keshav Gu  reports that she has never smoked. She has never used smokeless tobacco.     Vital Signs:   Vitals:    06/18/24  "1309   BP: 124/76   Weight: 77.3 kg (170 lb 8 oz)   Height: 181 cm (71.26\")     BMI: BMI is within normal parameters. No other follow-up for BMI required.     General: No acute distress. Alert and oriented.     Ortho Exam:  Examination of the left small finger demonstrates improvements in the overall swelling.  There are some mild residual swelling noted.  No erythema or warmth.  She is nontender to palpation along the MCP joint, proximal phalanx, and PIP joint.  She is 1 cm tip to palm actively but passively is able to achieve a composite fist.  There are no flexion deformities/contractures noted.  Sensation is intact to light touch throughout the hand.  Warm and well-perfused distally.    Imaging / Studies:    Imaging Results (Last 24 Hours)       ** No results found for the last 24 hours. **          Assessment / Plan    Assessment/Plan:   Keshav Gu is a 69 y.o. female with left small finger proximal phalanx base intra-articular fracture, DOI 3/29/2024     Patient has done well with a home therapy exercise program.  There have been improvements in her overall swelling and motion.  She has no pain.  We discussed continuing home therapy exercises versus formal hand therapy.  She would like to continue work on this at home.  Therefore she may return to clinic as needed.  All questions were addressed.  She was agreeable.      ICD-10-CM ICD-9-CM   1. Closed fracture of phalanx of digit of hand with routine healing, subsequent encounter  S62.609D V54.19     Follow Up:   Return if symptoms worsen or fail to improve.      Sunita Perdomo MD  [facility]  "

## 2024-06-19 ENCOUNTER — OFFICE VISIT (OUTPATIENT)
Dept: PULMONOLOGY | Facility: CLINIC | Age: 69
End: 2024-06-19
Payer: MEDICARE

## 2024-06-19 VITALS
HEIGHT: 71 IN | DIASTOLIC BLOOD PRESSURE: 90 MMHG | SYSTOLIC BLOOD PRESSURE: 138 MMHG | BODY MASS INDEX: 24.36 KG/M2 | WEIGHT: 174 LBS | HEART RATE: 84 BPM | TEMPERATURE: 97.8 F | OXYGEN SATURATION: 97 %

## 2024-06-19 DIAGNOSIS — J30.1 NON-SEASONAL ALLERGIC RHINITIS DUE TO POLLEN: ICD-10-CM

## 2024-06-19 DIAGNOSIS — R91.8 LUNG NODULES: Primary | ICD-10-CM

## 2024-06-19 DIAGNOSIS — Z78.9 NON-SMOKER: ICD-10-CM

## 2024-06-19 DIAGNOSIS — R91.8 MULTIPLE PULMONARY NODULES: ICD-10-CM

## 2024-06-19 PROCEDURE — 94726 PLETHYSMOGRAPHY LUNG VOLUMES: CPT | Performed by: INTERNAL MEDICINE

## 2024-06-19 PROCEDURE — 94375 RESPIRATORY FLOW VOLUME LOOP: CPT | Performed by: INTERNAL MEDICINE

## 2024-06-19 PROCEDURE — 3075F SYST BP GE 130 - 139MM HG: CPT | Performed by: INTERNAL MEDICINE

## 2024-06-19 PROCEDURE — 99204 OFFICE O/P NEW MOD 45 MIN: CPT | Performed by: INTERNAL MEDICINE

## 2024-06-19 PROCEDURE — 1160F RVW MEDS BY RX/DR IN RCRD: CPT | Performed by: INTERNAL MEDICINE

## 2024-06-19 PROCEDURE — 3080F DIAST BP >= 90 MM HG: CPT | Performed by: INTERNAL MEDICINE

## 2024-06-19 PROCEDURE — 1159F MED LIST DOCD IN RCRD: CPT | Performed by: INTERNAL MEDICINE

## 2024-06-19 PROCEDURE — 94729 DIFFUSING CAPACITY: CPT | Performed by: INTERNAL MEDICINE

## 2024-06-19 NOTE — PROGRESS NOTES
PULMONARY  NOTE    Chief Complaint     Pulmonary nodules, non-smoker    History of Present Illness     69-year-old female referred for abnormal CT scan of the chest    She is a non-smoker with no past history of known lung disease  She does have seasonally aggravated perennial rhinitis    She had an abnormal chest x-ray that was followed by a CT scan of the chest which revealed several pulmonary nodules as noted below  To her knowledge she has not had chest imaging in the past    She has never had hemoptysis  Cough typically goes along with sinus issues    She is on no respiratory medications    Patient Active Problem List   Diagnosis    Foot mass, left    Essential hypertension    Non-seasonal allergic rhinitis    Major depression in complete remission    Chronic interstitial cystitis    Dyslipidemia    Elevated fasting glucose    Aortic ectasia, thoracic    Fx phalanges, hand-closed    Multiple pulmonary nodules (Max 4mm)    Non-smoker      Allergies   Allergen Reactions    Macrobid [Nitrofurantoin Macrocrystal] Itching and Swelling       Current Outpatient Medications:     atorvastatin (LIPITOR) 10 MG tablet, Take 1 tablet by mouth Every Night., Disp: 90 tablet, Rfl: 3    escitalopram (LEXAPRO) 5 MG tablet, Take 1 tablet by mouth Daily., Disp: 90 tablet, Rfl: 3    fluticasone (Flonase) 50 MCG/ACT nasal spray, 2 sprays into the nostril(s) as directed by provider Daily., Disp: 1 bottle, Rfl: 11    Loratadine (Claritin) 10 MG capsule, Take  by mouth As Needed., Disp: , Rfl:     losartan (COZAAR) 50 MG tablet, Take 1 tablet by mouth Daily., Disp: 90 tablet, Rfl: 1    multivitamin (THERAGRAN) tablet tablet, Take  by mouth Daily., Disp: , Rfl:     VITAMIN D PO, Take 1 capsule by mouth Daily., Disp: , Rfl:   MEDICATION LIST AND ALLERGIES REVIEWED.    Family History   Problem Relation Age of Onset    Hyperlipidemia Mother     Heart attack Mother     Colon cancer Mother     Mental illness Mother         OCD    Arthritis  "Mother     Heart disease Mother         HCM    Cancer Mother         Not definitely diagnosed probably colon    Stomach cancer Father     Hypertension Father     Cancer Father         Stomach cancer    Stroke Maternal Grandmother     Hypertension Maternal Grandmother         Stroke    Stroke Maternal Grandfather     Mental illness Maternal Grandfather     Hypertension Maternal Grandfather         Stroke    Stroke Paternal Grandmother     Hypertension Paternal Grandmother         Stroke    Stroke Paternal Grandfather     Hypertension Paternal Grandfather         Stroke    Atrial fibrillation Other     Breast cancer Neg Hx     Ovarian cancer Neg Hx      Social History     Tobacco Use    Smoking status: Never    Smokeless tobacco: Never   Vaping Use    Vaping status: Never Used   Substance Use Topics    Alcohol use: Yes     Alcohol/week: 1.0 standard drink of alcohol     Types: 1 Glasses of wine per week     Comment: Less than weekly, occaisonally    Drug use: No     Social History     Social History Narrative    Son- anesthesiologist     Retired from [employer]/ work    Lifelong non-smoker     FAMILY AND SOCIAL HISTORY REVIEWED.    Review of Systems  IF PRESENT REFER TO SCANNED ROS SHEET FROM SAME DATE  OTHERWISE ROS OBTAINED AND NON-CONTRIBUTORY OVER HPI.    /90   Pulse 84   Temp 97.8 °F (36.6 °C)   Ht 181 cm (71.26\")   Wt 78.9 kg (174 lb)   SpO2 97% Comment: resting, room air  BMI 24.09 kg/m²   Physical Exam  Vitals and nursing note reviewed.   Constitutional:       General: She is not in acute distress.     Appearance: She is well-developed. She is not diaphoretic.   HENT:      Head: Normocephalic and atraumatic.   Neck:      Thyroid: No thyromegaly.   Cardiovascular:      Rate and Rhythm: Normal rate and regular rhythm.      Heart sounds: Normal heart sounds. No murmur heard.  Pulmonary:      Effort: Pulmonary effort is normal.      Breath sounds: Normal breath sounds. No stridor. "   Lymphadenopathy:      Cervical: No cervical adenopathy.      Upper Body:      Right upper body: No supraclavicular or epitrochlear adenopathy.      Left upper body: No supraclavicular or epitrochlear adenopathy.   Skin:     General: Skin is warm and dry.   Neurological:      Mental Status: She is alert and oriented to person, place, and time.   Psychiatric:         Behavior: Behavior normal.         Results     CT scan of the chest reviewed on PACS  Several pulmonary nodules, max 4 mm in size  No older chest imaging available for comparison    PFTs reveal no airway obstruction, no restriction, and a normal diffusion capacity    Immunization History   Administered Date(s) Administered    Arexvy (RSV, Adults 60+ yrs) 01/04/2024    COVID-19 (MODERNA) BIVALENT 12+YRS 10/30/2022    COVID-19 (PFIZER) BIVALENT 12+YRS 06/22/2023    COVID-19 (PFIZER) Purple Cap Monovalent 02/13/2021, 03/06/2021, 10/04/2021    COVID-19 F23 (MODERNA) 12YRS+ (SPIKEVAX) 09/22/2023    Covid-19 (Pfizer) Gray Cap Monovalent 08/04/2022    Fluad Quad 65+ 10/24/2021, 10/05/2022, 10/30/2023    Fluzone (or Fluarix & Flulaval for VFC) >6mos 10/22/2019, 09/17/2020    Fluzone Quad >6mos (Multi-dose) 11/20/2015, 11/15/2016, 10/17/2017    Hepatitis A 10/16/2018, 04/16/2019    Influenza Quad Vaccine (Inpatient) 11/19/2010    Pneumococcal Conjugate 20-Valent (PCV20) 12/22/2022    Pneumococcal Polysaccharide (PPSV23) 12/14/2021    Shingrix 04/01/2022, 10/17/2022    Tdap 06/08/2009, 09/17/2020     Problem List       ICD-10-CM ICD-9-CM   1. Lung nodules  R91.8 793.19   2. Multiple pulmonary nodules (Max 4mm)  R91.8 793.19   3. Non-smoker  Z78.9 V49.89   4. Non-seasonal allergic rhinitis due to pollen  J30.1 477.0       Discussion     We reviewed her test results  Her PFTs are normal    We reviewed her CT scan of the chest on PACS  She has several noncalcified pulmonary nodules and at least 1 calcified nodule that most likely represent the sequela of a prior  granulomatous insult  Maximum pulmonary nodules 4 mm in size  I gave her literature on pulmonary nodules and management guidelines  Based on Fleischner Society guidelines I would not recommend further chest imaging in this non-smoking individual    I will see her back on an as-needed basis    Moderate level of Medical Decision Making complexity based on 1 undiagnosed new problem or 2 stable chronic conditions, independent interpretation of tests, and/or prescription drug management     Samir Pearson MD  Note electronically signed    CC: Vonda Barajas MD

## 2024-07-08 ENCOUNTER — OFFICE VISIT (OUTPATIENT)
Age: 69
End: 2024-07-08
Payer: MEDICARE

## 2024-07-08 VITALS
HEIGHT: 71 IN | DIASTOLIC BLOOD PRESSURE: 82 MMHG | OXYGEN SATURATION: 96 % | HEART RATE: 78 BPM | WEIGHT: 174 LBS | BODY MASS INDEX: 24.36 KG/M2 | SYSTOLIC BLOOD PRESSURE: 128 MMHG

## 2024-07-08 DIAGNOSIS — I10 ESSENTIAL HYPERTENSION: Primary | Chronic | ICD-10-CM

## 2024-07-08 PROCEDURE — 1160F RVW MEDS BY RX/DR IN RCRD: CPT | Performed by: FAMILY MEDICINE

## 2024-07-08 PROCEDURE — 1125F AMNT PAIN NOTED PAIN PRSNT: CPT | Performed by: FAMILY MEDICINE

## 2024-07-08 PROCEDURE — 99213 OFFICE O/P EST LOW 20 MIN: CPT | Performed by: FAMILY MEDICINE

## 2024-07-08 PROCEDURE — 1159F MED LIST DOCD IN RCRD: CPT | Performed by: FAMILY MEDICINE

## 2024-07-08 PROCEDURE — 3079F DIAST BP 80-89 MM HG: CPT | Performed by: FAMILY MEDICINE

## 2024-07-08 PROCEDURE — 3074F SYST BP LT 130 MM HG: CPT | Performed by: FAMILY MEDICINE

## 2024-07-08 RX ORDER — LOSARTAN POTASSIUM 50 MG/1
50 TABLET ORAL DAILY
Qty: 90 TABLET | Refills: 1 | Status: SHIPPED | OUTPATIENT
Start: 2024-07-08

## 2024-07-08 NOTE — PROGRESS NOTES
"Chief Complaint  Hypertension    Subjective        Keshav Gu presents to [facility]  Hypertension  This is a chronic problem. The problem is controlled. Current antihypertension treatment includes angiotensin blockers.   Answers submitted by the patient for this visit:  Other (Submitted on 7/7/2024)  Please describe your symptoms.: Checkup only  Have you had these symptoms before?: No  How long have you been having these symptoms?: 1-4 days  Primary Reason for Visit (Submitted on 7/7/2024)  What is the primary reason for your visit?: Other    She needs a new blood pressure machine because readings are always showing high. No symptoms other than slight sinus headache. She wears a smart watch and told not enough deep sleep. She sleeps 7.5-8 hours. She is a little draggy in the morning and not a morning person.           Objective   Vital Signs:  /82 (BP Location: Right arm, Patient Position: Sitting, Cuff Size: Adult)   Pulse 78   Ht 180.3 cm (71\")   Wt 78.9 kg (174 lb)   SpO2 96%   BMI 24.27 kg/m²   Estimated body mass index is 24.27 kg/m² as calculated from the following:    Height as of this encounter: 180.3 cm (71\").    Weight as of this encounter: 78.9 kg (174 lb).       BMI is within normal parameters. No other follow-up for BMI required.      Physical Exam  Vitals reviewed.   Constitutional:       General: She is not in acute distress.     Appearance: She is not ill-appearing.   Cardiovascular:      Rate and Rhythm: Normal rate and regular rhythm.   Pulmonary:      Effort: Pulmonary effort is normal.      Breath sounds: Normal breath sounds.   Neurological:      Mental Status: She is alert.      Gait: Gait normal.   Psychiatric:         Mood and Affect: Mood normal.        Result Review :            Office Visit with Samir Pearson MD (06/19/2024)     Office Visit with Sunita Perdomo MD (06/18/2024)    Office Visit with Samir Watkins III, MD " (03/26/2024)   Assessment and Plan     Diagnoses and all orders for this visit:    1. Essential hypertension (Primary)  -     losartan (COZAAR) 50 MG tablet; Take 1 tablet by mouth Daily.  Dispense: 90 tablet; Refill: 1    Stable.  Continue losartan.  Recheck in 6 months.         Follow Up     Return in about 26 weeks (around 1/6/2025) for MWV and fasting labs, as scheduled.  Patient was given instructions and counseling regarding her condition or for health maintenance advice. Please see specific information pulled into the AVS if appropriate.       Electronically signed by Vonda Barajas MD, 07/08/24, 9:43 AM EDT.

## 2024-08-22 DIAGNOSIS — F32.5 MAJOR DEPRESSION IN COMPLETE REMISSION: ICD-10-CM

## 2024-08-22 RX ORDER — ESCITALOPRAM OXALATE 10 MG/1
10 TABLET ORAL DAILY
Qty: 90 TABLET | Refills: 3 | OUTPATIENT
Start: 2024-08-22

## 2024-08-22 NOTE — TELEPHONE ENCOUNTER
Called and spoke with patient because we have her on Lexapro 5mg and the request was for the 10mg. Patient stated it was sent in error and to cancel it.

## 2024-08-22 NOTE — TELEPHONE ENCOUNTER
Rx Refill Note  Requested Prescriptions     Pending Prescriptions Disp Refills    escitalopram (LEXAPRO) 10 MG tablet [Pharmacy Med Name: ESCITALOPRAM 10 MG TABLET] 90 tablet 3     Sig: TAKE ONE TABLET BY MOUTH DAILY      Last office visit with prescribing clinician: 7/8/2024   Last telemedicine visit with prescribing clinician: Visit date not found   Next office visit with prescribing clinician: 1/6/2025                         Would you like a call back once the refill request has been completed: [] Yes [] No    If the office needs to give you a call back, can they leave a voicemail: [] Yes [] No    Myriam Blanca MA  08/22/24, 13:23 EDT

## 2024-08-30 ENCOUNTER — TRANSCRIBE ORDERS (OUTPATIENT)
Dept: ADMINISTRATIVE | Facility: HOSPITAL | Age: 69
End: 2024-08-30
Payer: MEDICARE

## 2024-08-30 DIAGNOSIS — Z12.31 VISIT FOR SCREENING MAMMOGRAM: Primary | ICD-10-CM

## 2024-09-18 ENCOUNTER — HOSPITAL ENCOUNTER (OUTPATIENT)
Facility: HOSPITAL | Age: 69
Discharge: HOME | End: 2024-09-18
Admitting: FAMILY MEDICINE
Payer: MEDICARE

## 2024-09-18 DIAGNOSIS — Z12.31 VISIT FOR SCREENING MAMMOGRAM: ICD-10-CM

## 2024-09-18 LAB
NCCN CRITERIA FLAG: NORMAL
TYRER CUZICK SCORE: 7.1

## 2024-09-18 PROCEDURE — 77067 SCR MAMMO BI INCL CAD: CPT

## 2024-09-18 PROCEDURE — 77063 BREAST TOMOSYNTHESIS BI: CPT

## 2024-09-20 DIAGNOSIS — I10 ESSENTIAL HYPERTENSION: Chronic | ICD-10-CM

## 2024-09-20 RX ORDER — LOSARTAN POTASSIUM 50 MG/1
50 TABLET ORAL DAILY
Qty: 90 TABLET | Refills: 1 | OUTPATIENT
Start: 2024-09-20

## 2025-01-09 DIAGNOSIS — F32.5 MAJOR DEPRESSION IN COMPLETE REMISSION: ICD-10-CM

## 2025-01-10 RX ORDER — ESCITALOPRAM OXALATE 5 MG/1
5 TABLET ORAL DAILY
Qty: 90 TABLET | Refills: 3 | Status: SHIPPED | OUTPATIENT
Start: 2025-01-10

## 2025-02-05 ENCOUNTER — E-VISIT (OUTPATIENT)
Dept: ADMINISTRATIVE | Facility: OTHER | Age: 70
End: 2025-02-05
Payer: MEDICARE

## 2025-02-05 ENCOUNTER — OFFICE VISIT (OUTPATIENT)
Age: 70
End: 2025-02-05
Payer: MEDICARE

## 2025-02-05 VITALS
TEMPERATURE: 97.7 F | SYSTOLIC BLOOD PRESSURE: 124 MMHG | BODY MASS INDEX: 23.42 KG/M2 | WEIGHT: 167.31 LBS | DIASTOLIC BLOOD PRESSURE: 74 MMHG | HEIGHT: 71 IN

## 2025-02-05 DIAGNOSIS — J10.1 INFLUENZA A: Primary | ICD-10-CM

## 2025-02-05 LAB
EXPIRATION DATE: ABNORMAL
EXPIRATION DATE: NORMAL
FLUAV AG UPPER RESP QL IA.RAPID: DETECTED
FLUBV AG UPPER RESP QL IA.RAPID: NOT DETECTED
INTERNAL CONTROL: ABNORMAL
INTERNAL CONTROL: NORMAL
Lab: ABNORMAL
Lab: NORMAL
S PYO AG THROAT QL: NEGATIVE
SARS-COV-2 AG UPPER RESP QL IA.RAPID: NOT DETECTED

## 2025-02-05 PROCEDURE — 3078F DIAST BP <80 MM HG: CPT | Performed by: FAMILY MEDICINE

## 2025-02-05 PROCEDURE — 87428 SARSCOV & INF VIR A&B AG IA: CPT | Performed by: FAMILY MEDICINE

## 2025-02-05 PROCEDURE — 1125F AMNT PAIN NOTED PAIN PRSNT: CPT | Performed by: FAMILY MEDICINE

## 2025-02-05 PROCEDURE — 3074F SYST BP LT 130 MM HG: CPT | Performed by: FAMILY MEDICINE

## 2025-02-05 PROCEDURE — 87880 STREP A ASSAY W/OPTIC: CPT | Performed by: FAMILY MEDICINE

## 2025-02-05 PROCEDURE — 1159F MED LIST DOCD IN RCRD: CPT | Performed by: FAMILY MEDICINE

## 2025-02-05 PROCEDURE — 99213 OFFICE O/P EST LOW 20 MIN: CPT | Performed by: FAMILY MEDICINE

## 2025-02-05 PROCEDURE — 1160F RVW MEDS BY RX/DR IN RCRD: CPT | Performed by: FAMILY MEDICINE

## 2025-02-05 RX ORDER — BROMPHENIRAMINE MALEATE, PSEUDOEPHEDRINE HYDROCHLORIDE, AND DEXTROMETHORPHAN HYDROBROMIDE 2; 30; 10 MG/5ML; MG/5ML; MG/5ML
10 SYRUP ORAL 4 TIMES DAILY PRN
Qty: 120 ML | Refills: 0 | Status: SHIPPED | OUTPATIENT
Start: 2025-02-05 | End: 2025-02-08

## 2025-02-05 RX ORDER — OSELTAMIVIR PHOSPHATE 75 MG/1
75 CAPSULE ORAL 2 TIMES DAILY
Qty: 10 CAPSULE | Refills: 0 | Status: SHIPPED | OUTPATIENT
Start: 2025-02-05 | End: 2025-02-10

## 2025-02-05 NOTE — PROGRESS NOTES
"Chief Complaint  nasal drainage, Fever, Cough, and Chills    Subjective        Keshav Gu presents to [facility]  History of Present Illness    History of Present Illness  The patient is a 70-year-old female presenting for nasal drainage, fever, cough, and chills.    She began experiencing symptoms indicative of illness yesterday, although she had a headache on Monday evening, which she attributed to her usual sinus allergy. She also reported significant nasal drainage during the night. Despite feeling well yesterday morning and engaging in an hour of yoga, her condition deteriorated by the afternoon. She experienced a persistent nonproductive cough throughout the night, which disrupted her sleep. This morning, she reported feeling unwell with a temperature of 103.7 degrees Fahrenheit. She self-administered one dose of Tylenol and ibuprofen, which alleviated her symptoms. She currently reports a runny nose, mild sinus congestion, and a frontal headache that has persisted for several days. Her appetite has decreased due to nausea associated with her elevated temperature, but she was able to consume a small amount of banana, crackers, and Jell-O this morning after her medication took effect. She has been maintaining hydration with water and ginger ale. She reports no episodes of vomiting or diarrhea but does report mild body aches. She also reports a rough sensation on her tongue and has not used a cough drop since last night.    MEDICATIONS  Current: Tylenol, ibuprofen    Objective   Vital Signs:  /74   Temp 97.7 °F (36.5 °C) (Temporal)   Ht 180.3 cm (71\")   Wt 75.9 kg (167 lb 5 oz)   BMI 23.34 kg/m²   Estimated body mass index is 23.34 kg/m² as calculated from the following:    Height as of this encounter: 180.3 cm (71\").    Weight as of this encounter: 75.9 kg (167 lb 5 oz).    BMI is within normal parameters. No other follow-up for BMI required.      The following " portions of the patient's history were reviewed and updated as appropriate: allergies, current medications, past family history, past medical history, past social history, past surgical history, and problem list.       Physical Exam  Vitals reviewed.   Constitutional:       General: She is not in acute distress.     Appearance: She is ill-appearing. She is not toxic-appearing or diaphoretic.   HENT:      Right Ear: Tympanic membrane and ear canal normal.      Left Ear: Tympanic membrane and ear canal normal.      Nose: Congestion (erythema) present. No rhinorrhea.      Right Sinus: Maxillary sinus tenderness and frontal sinus tenderness present.      Left Sinus: Frontal sinus tenderness present. No maxillary sinus tenderness.      Mouth/Throat:      Mouth: Mucous membranes are moist. No oral lesions.      Pharynx: Posterior oropharyngeal erythema present. No pharyngeal swelling, oropharyngeal exudate or uvula swelling.      Tonsils: No tonsillar abscesses.     Eyes:      General:         Right eye: No discharge.         Left eye: No discharge.   Cardiovascular:      Rate and Rhythm: Normal rate and regular rhythm.   Pulmonary:      Effort: Pulmonary effort is normal.      Breath sounds: Normal breath sounds.      Comments: Hacking cough  Lymphadenopathy:      Head:      Right side of head: Submandibular adenopathy present. No tonsillar, preauricular or posterior auricular adenopathy.      Left side of head: Submandibular adenopathy present. No tonsillar, preauricular or posterior auricular adenopathy.   Neurological:      Mental Status: She is alert. Mental status is at baseline.   Psychiatric:         Mood and Affect: Mood normal.        Result Review :  The following data was reviewed by: Vonda Barajas MD on 02/05/2025:           Results for orders placed or performed in visit on 02/05/25   POC Rapid Strep A    Collection Time: 02/05/25 10:32 AM    Specimen: Swab   Result Value Ref Range    Rapid Strep A  Screen Negative Negative, VALID, INVALID, Not Performed    Internal Control Passed Passed    Lot Number [id number]     Expiration Date 01/03/2027    POCT SARS-CoV-2 + Flu Antigen MICHEL    Collection Time: 02/05/25 10:35 AM    Specimen: Swab   Result Value Ref Range    SARS Antigen Not Detected Not Detected, Presumptive Negative    Influenza A Antigen MICHEL Detected (A) Not Detected    Influenza B Antigen MICHEL Not Detected Not Detected    Internal Control Passed Passed    Lot Number [id number]     Expiration Date 11/14/2025           Assessment and Plan   Diagnoses and all orders for this visit:    1. Influenza A (Primary)  -     POCT SARS-CoV-2 + Flu Antigen MICHEL  -     POC Rapid Strep A  -     oseltamivir (Tamiflu) 75 MG capsule; Take 1 capsule by mouth 2 (Two) Times a Day for 5 days.  Dispense: 10 capsule; Refill: 0  -     brompheniramine-pseudoephedrine-DM 30-2-10 MG/5ML syrup; Take 10 mL by mouth 4 (Four) Times a Day As Needed for Congestion or Cough for up to 3 days.  Dispense: 120 mL; Refill: 0             Assessment & Plan  1. Influenza A.  She tested positive for influenza A and negative for COVID-19 and strep. She is within the 48-hour window of symptom onset to be eligible for Tamiflu. Her risk factor is age greater than 65. A prescription for Tamiflu will be sent to [facility] pharmacy. She is advised to continue using Tylenol and ibuprofen for pain and fevers. A prescription for Bromfed cough medicine will be provided to help with the cough and nasal symptoms. She is advised to stay home until fever-free for more than 24 hours and to wear a mask in public while picking up the prescription due to being contagious. If she experiences shortness of breath, chest pain, or lethargy, she should go to the emergency room immediately.      Follow Up   Return if symptoms worsen or fail to improve.  Patient was given instructions and counseling regarding her condition or for health maintenance advice. Please see specific  information pulled into the AVS if appropriate.         Patient or patient representative verbalized consent for the use of Ambient Listening during the visit with  Vonda Barajas MD for chart documentation. 2/5/2025  10:34 EST    Electronically signed by Vonda Barajas MD, 02/05/25, 10:43 AM EST.

## 2025-02-05 NOTE — E-VISIT ESCALATED
"Status: Referred Out  Date: 2025 06:57:11  Acuity Level: Within 24 hours  Referral message:  We're sorry you are not feeling well.  Your safety is important to us.  A high fever is unusual when people have minor respiratory infections. Additional information on your symptoms and physical exam is needed to determine the cause   of your symptoms and best treatment for you.  For the most appropriate care, please be seen:   At a clinic or an urgent care  Within 24 hours   You won't be charged for this visit. We hope you feel better soon!   Patient: Keshav Gu  Patient : 1955  Patient Address: [address] [place] [id number]  Patient Phone: [phone]  Clinician Response: Unavailable  Diagnosis: Unavailable  Diagnosis ICD: Unavailable     Patient Interview Questions and Responses:  Clinical Protocol: URI  Please select the reason for your visit today.: Cold, sinus infection, or flu  Please select when your first symptom started.: 1-2 days ago  Knowing exactly when the symptoms started will help the provider diagnose and treat the condition appropriately.Please enter the date your symptoms started (MM/DD/YYYY). If you do not know the exact date, please enter \"unknown\".: 2025  Do you currently have these symptoms? Nasal congestion (stuffy nose): Yes  Do you currently have these symptoms? Runny nose: Yes  Do you currently have these symptoms? Cough: Yes  Do you currently have these symptoms? Sore throat: No  Color helps provide description to the provider but it does not help decide if the condition is viral or bacterial or if antibiotics will be prescribed.What color is your mucus? Select all that apply. Clear: Yes  Color helps provide description to the provider but it does not help decide if the condition is viral or bacterial or if antibiotics will be prescribed.What color is your mucus? Select all that apply. White: No  Color helps provide description to the provider but it does not help " decide if the condition is viral or bacterial or if antibiotics will be prescribed.What color is your mucus? Select all that apply. Yellow: No  Color helps provide description to the provider but it does not help decide if the condition is viral or bacterial or if antibiotics will be prescribed.What color is your mucus? Select all that apply. Green: No  Color helps provide description to the provider but it does not help decide if the condition is viral or bacterial or if antibiotics will be prescribed.What color is your mucus? Select all that apply. Blood-tinged: No  Color helps provide description to the provider but it does not help decide if the condition is viral or bacterial or if antibiotics will be prescribed.What color is your mucus? Select all that apply. Bloody: No  How often do you cough?: Every 5-10 minutes  Is the cough more bothersome at night?: Yes  Does anything (phlegm) come up into the throat when you cough?: No  Do you think the cough is caused by post-nasal drip (the phlegm going down the back of the throat)?: Yes  Do you currently have these symptoms? Achiness: Yes  Do you currently have these symptoms? Feeling run-down or sick: Yes  Do you currently have these symptoms? Chills: Yes  Do you feel feverish?: Yes  Is it possible to take your temperature now?: Yes  Please enter your current temperature in Fahrenheit below:: 103.7

## 2025-02-25 ENCOUNTER — PATIENT MESSAGE (OUTPATIENT)
Age: 70
End: 2025-02-25
Payer: MEDICARE

## 2025-03-11 ENCOUNTER — LAB (OUTPATIENT)
Age: 70
End: 2025-03-11
Payer: MEDICARE

## 2025-03-11 ENCOUNTER — OFFICE VISIT (OUTPATIENT)
Age: 70
End: 2025-03-11
Payer: MEDICARE

## 2025-03-11 VITALS
HEIGHT: 71 IN | BODY MASS INDEX: 23.13 KG/M2 | OXYGEN SATURATION: 96 % | WEIGHT: 165.19 LBS | HEART RATE: 76 BPM | DIASTOLIC BLOOD PRESSURE: 76 MMHG | SYSTOLIC BLOOD PRESSURE: 116 MMHG

## 2025-03-11 DIAGNOSIS — R73.01 ELEVATED FASTING GLUCOSE: ICD-10-CM

## 2025-03-11 DIAGNOSIS — I10 ESSENTIAL HYPERTENSION: Chronic | ICD-10-CM

## 2025-03-11 DIAGNOSIS — Z00.00 MEDICARE ANNUAL WELLNESS VISIT, SUBSEQUENT: Primary | ICD-10-CM

## 2025-03-11 DIAGNOSIS — F33.42 RECURRENT MAJOR DEPRESSIVE DISORDER, IN FULL REMISSION: ICD-10-CM

## 2025-03-11 DIAGNOSIS — Z13.0 SCREENING FOR DEFICIENCY ANEMIA: ICD-10-CM

## 2025-03-11 DIAGNOSIS — J30.89 NON-SEASONAL ALLERGIC RHINITIS, UNSPECIFIED TRIGGER: ICD-10-CM

## 2025-03-11 DIAGNOSIS — E78.5 DYSLIPIDEMIA: ICD-10-CM

## 2025-03-11 PROBLEM — S62.609A FX PHALANGES, HAND-CLOSED: Status: RESOLVED | Noted: 2024-05-07 | Resolved: 2025-03-11

## 2025-03-11 LAB
ALBUMIN SERPL-MCNC: 4.3 G/DL (ref 3.5–5.2)
ALBUMIN/GLOB SERPL: 1.5 G/DL
ALP SERPL-CCNC: 55 U/L (ref 39–117)
ALT SERPL W P-5'-P-CCNC: 22 U/L (ref 1–33)
ANION GAP SERPL CALCULATED.3IONS-SCNC: 9.1 MMOL/L (ref 5–15)
AST SERPL-CCNC: 27 U/L (ref 1–32)
BILIRUB SERPL-MCNC: 0.5 MG/DL (ref 0–1.2)
BUN SERPL-MCNC: 18 MG/DL (ref 8–23)
BUN/CREAT SERPL: 22 (ref 7–25)
CALCIUM SPEC-SCNC: 9.3 MG/DL (ref 8.6–10.5)
CHLORIDE SERPL-SCNC: 104 MMOL/L (ref 98–107)
CHOLEST SERPL-MCNC: 159 MG/DL (ref 0–200)
CO2 SERPL-SCNC: 25.9 MMOL/L (ref 22–29)
CREAT SERPL-MCNC: 0.82 MG/DL (ref 0.57–1)
DEPRECATED RDW RBC AUTO: 41.9 FL (ref 37–54)
EGFRCR SERPLBLD CKD-EPI 2021: 77.5 ML/MIN/1.73
ERYTHROCYTE [DISTWIDTH] IN BLOOD BY AUTOMATED COUNT: 13.1 % (ref 12.3–15.4)
GLOBULIN UR ELPH-MCNC: 2.8 GM/DL
GLUCOSE SERPL-MCNC: 91 MG/DL (ref 65–99)
HCT VFR BLD AUTO: 40.1 % (ref 34–46.6)
HDLC SERPL-MCNC: 74 MG/DL (ref 40–60)
HGB BLD-MCNC: 13.3 G/DL (ref 12–15.9)
LDLC SERPL CALC-MCNC: 77 MG/DL (ref 0–100)
LDLC/HDLC SERPL: 1.06 {RATIO}
MCH RBC QN AUTO: 28.9 PG (ref 26.6–33)
MCHC RBC AUTO-ENTMCNC: 33.2 G/DL (ref 31.5–35.7)
MCV RBC AUTO: 87.2 FL (ref 79–97)
PLATELET # BLD AUTO: 257 10*3/MM3 (ref 140–450)
PMV BLD AUTO: 10.7 FL (ref 6–12)
POTASSIUM SERPL-SCNC: 4 MMOL/L (ref 3.5–5.2)
PROT SERPL-MCNC: 7.1 G/DL (ref 6–8.5)
RBC # BLD AUTO: 4.6 10*6/MM3 (ref 3.77–5.28)
SODIUM SERPL-SCNC: 139 MMOL/L (ref 136–145)
TRIGL SERPL-MCNC: 32 MG/DL (ref 0–150)
VLDLC SERPL-MCNC: 8 MG/DL (ref 5–40)
WBC NRBC COR # BLD AUTO: 5.72 10*3/MM3 (ref 3.4–10.8)

## 2025-03-11 PROCEDURE — 36415 COLL VENOUS BLD VENIPUNCTURE: CPT | Performed by: FAMILY MEDICINE

## 2025-03-11 PROCEDURE — 83036 HEMOGLOBIN GLYCOSYLATED A1C: CPT | Performed by: FAMILY MEDICINE

## 2025-03-11 PROCEDURE — 85027 COMPLETE CBC AUTOMATED: CPT | Performed by: FAMILY MEDICINE

## 2025-03-11 PROCEDURE — 80061 LIPID PANEL: CPT | Performed by: FAMILY MEDICINE

## 2025-03-11 PROCEDURE — 80053 COMPREHEN METABOLIC PANEL: CPT | Performed by: FAMILY MEDICINE

## 2025-03-11 PROCEDURE — 84443 ASSAY THYROID STIM HORMONE: CPT | Performed by: FAMILY MEDICINE

## 2025-03-11 RX ORDER — LOSARTAN POTASSIUM 50 MG/1
50 TABLET ORAL DAILY
Qty: 90 TABLET | Refills: 1 | Status: SHIPPED | OUTPATIENT
Start: 2025-03-11

## 2025-03-11 RX ORDER — ATORVASTATIN CALCIUM 10 MG/1
10 TABLET, FILM COATED ORAL NIGHTLY
Qty: 90 TABLET | Refills: 3 | Status: SHIPPED | OUTPATIENT
Start: 2025-03-11

## 2025-03-11 RX ORDER — FLUTICASONE PROPIONATE 50 MCG
2 SPRAY, SUSPENSION (ML) NASAL DAILY
Qty: 48 G | Refills: 3 | Status: SHIPPED | OUTPATIENT
Start: 2025-03-11

## 2025-03-11 NOTE — PATIENT INSTRUCTIONS
Advance Care Planning and Advance Directives     You make decisions on a daily basis - decisions about where you want to live, your career, your home, your life. Perhaps one of the most important decisions you face is your choice for future medical care. Take time to talk with your family and your healthcare team and start planning today.  Advance Care Planning is a process that can help you:  Understand possible future healthcare decisions in light of your own experiences  Reflect on those decision in light of your goals and values  Discuss your decisions with those closest to you and the healthcare professionals that care for you  Make a plan by creating a document that reflects your wishes    Surrogate Decision Maker  In the event of a medical emergency, which has left you unable to communicate or to make your own decisions, you would need someone to make decisions for you.  It is important to discuss your preferences for medical treatment with this person while you are in good health.     Qualities of a surrogate decision maker:  Willing to take on this role and responsibility  Knows what you want for future medical care  Willing to follow your wishes even if they don't agree with them  Able to make difficult medical decisions under stressful circumstances    Advance Directives  These are legal documents you can create that will guide your healthcare team and decision maker(s) when needed. These documents can be stored in the electronic medical record.    Living Will - a legal document to guide your care if you have a terminal condition or a serious illness and are unable to communicate. States vary by statute in document names/types, but most forms may include one or more of the following:        -  Directions regarding life-prolonging treatments        -  Directions regarding artificially provided nutrition/hydration        -  Choosing a healthcare decision maker        -  Direction regarding organ/tissue  donation    Durable Power of  for Healthcare - this document names an -in-fact to make medical decisions for you, but it may also allow this person to make personal and financial decisions for you. Please seek the advice of an  if you need this type of document.    **Advance Directives are not required and no one may discriminate against you if you do not sign one.    Medical Orders  Many states allow specific forms/orders signed by your physician to record your wishes for medical treatment in your current state of health. This form, signed in personal communication with your physician, addresses resuscitation and other medical interventions that you may or may not want.      For more information or to schedule a time with a [facility] Advance Care Planning Facilitator contact: [facility] or call [phone] and someone will contact you directly.    Medicare Wellness  Personal Prevention Plan of Service     Date of Office Visit:    Encounter Provider:  Vonda Barajas MD  Place of Service:  [facility]  Patient Name: Keshav Gu  :  1955    As part of the Medicare Wellness portion of your visit today, we are providing you with this personalized preventive plan of services (PPPS). This plan is based upon recommendations of the [place] Preventive Services Task Force (USPSTF) and the Advisory Committee on Immunization Practices (ACIP).    This lists the preventive care services that should be considered, and provides dates of when you are due. Items listed as completed are up-to-date and do not require any further intervention.    Health Maintenance   Topic Date Due    INFLUENZA VACCINE  2024    ANNUAL WELLNESS VISIT  2025    LIPID PANEL  2025    COVID-19 Vaccine ( season) 2025    MAMMOGRAM  2025    DXA SCAN  2026    COLORECTAL CANCER SCREENING  2026    PAP SMEAR  2026     TDAP/TD VACCINES (3 - Td or Tdap) 09/17/2030    HEPATITIS C SCREENING  Completed    Pneumococcal Vaccine 50+  Completed    ZOSTER VACCINE  Completed       Orders Placed This Encounter   Procedures    CBC (No Diff)     Standing Status:   Future     Expected Date:   3/16/2025     Expiration Date:   3/11/2026     Release to patient:   Routine Release [[id number]]    Comprehensive Metabolic Panel     Standing Status:   Future     Expected Date:   3/16/2025     Expiration Date:   3/11/2026     Release to patient:   Routine Release [[id number]]    Lipid Panel     Standing Status:   Future     Expected Date:   3/16/2025     Expiration Date:   3/11/2026     Release to patient:   Routine Release [[id number]]    TSH Rfx On Abnormal To Free T4     Standing Status:   Future     Expected Date:   3/16/2025     Expiration Date:   3/11/2026     Release to patient:   Routine Release [[id number]]       Return in about 6 months (around 9/11/2025) for Office visit HTN AND , MWV and fasting labs.

## 2025-03-11 NOTE — PROGRESS NOTES
Subjective   The ABCs of the Annual Wellness Visit  Medicare Wellness Visit      Keshav Gu is a 70 y.o. patient who presents for a Medicare Wellness Visit.    The following portions of the patient's history were reviewed and   updated as appropriate: allergies, current medications, past family history, past medical history, past social history, past surgical history, and problem list.    Compared to one year ago, the patient's physical   health is better.  Compared to one year ago, the patient's mental   health is the same.    Recent Hospitalizations:  She was not admitted to the hospital during the last year.     Current Medical Providers:  Patient Care Team:  Vonda Barajas MD as PCP - General (Family Medicine)  Nia, Priya Huffman MD as Consulting Physician (Dermatology)  Samir Watkins III, MD as Cardiologist (Cardiology)    Outpatient Medications Prior to Visit   Medication Sig Dispense Refill    escitalopram (LEXAPRO) 5 MG tablet TAKE 1 TABLET BY MOUTH DAILY 90 tablet 3    Loratadine (Claritin) 10 MG capsule Take  by mouth As Needed.      multivitamin (THERAGRAN) tablet tablet Take  by mouth Daily.      VITAMIN D PO Take 1 capsule by mouth Daily.      atorvastatin (LIPITOR) 10 MG tablet Take 1 tablet by mouth Every Night. 90 tablet 3    fluticasone (Flonase) 50 MCG/ACT nasal spray 2 sprays into the nostril(s) as directed by provider Daily. 1 bottle 11    losartan (COZAAR) 50 MG tablet Take 1 tablet by mouth Daily. 90 tablet 1     No facility-administered medications prior to visit.     No opioid medication identified on active medication list. I have reviewed chart for other potential  high risk medication/s and harmful drug interactions in the elderly.      Aspirin is not on active medication list.  Aspirin use is not indicated based on review of current medical condition/s. Risk of harm outweighs potential benefits.  .    Patient Active Problem List   Diagnosis    Foot mass, left    Essential  "hypertension    Non-seasonal allergic rhinitis    Major depression in complete remission    Chronic interstitial cystitis    Dyslipidemia    Elevated fasting glucose    Aortic ectasia, thoracic    Multiple pulmonary nodules (Max 4mm)    Non-smoker     Advance Care Planning Advance Directive is not on file.  ACP discussion was held with the patient during this visit. Patient does not have an advance directive, information provided.            Objective   Vitals:    03/11/25 0959   BP: 116/76   BP Location: Left arm   Patient Position: Sitting   Cuff Size: Adult   Pulse: 76   SpO2: 96%   Weight: 74.9 kg (165 lb 3 oz)   Height: 180.3 cm (70.98\")   PainSc: 3    PainLoc: Neck       Estimated body mass index is 23.05 kg/m² as calculated from the following:    Height as of this encounter: 180.3 cm (70.98\").    Weight as of this encounter: 74.9 kg (165 lb 3 oz).    BMI is within normal parameters. No other follow-up for BMI required.           Does the patient have evidence of cognitive impairment? No Some names are hard for her.                                                                                                 Health  Risk Assessment    Smoking Status:  Social History     Tobacco Use   Smoking Status Never    Passive exposure: Never   Smokeless Tobacco Never     Alcohol Consumption:  Social History     Substance and Sexual Activity   Alcohol Use Yes    Alcohol/week: 1.0 standard drink of alcohol    Types: 1 Glasses of wine per week    Comment: Less than weekly, occaisonally       Fall Risk Screen  STEADI Fall Risk Assessment was completed, and patient is at LOW risk for falls.Assessment completed on:3/11/2025    Depression Screening   Little interest or pleasure in doing things? Not at all   Feeling down, depressed, or hopeless? Not at all   PHQ-2 Total Score 0      Health Habits and Functional and Cognitive Screening:      3/5/2025    11:23 AM   Functional & Cognitive Status   Do you have difficulty preparing " food and eating? No   Do you have difficulty bathing yourself, getting dressed or grooming yourself? No   Do you have difficulty using the toilet? No   Do you have difficulty moving around from place to place? No   Do you have trouble with steps or getting out of a bed or a chair? No   Current Diet Well Balanced Diet   Dental Exam Up to date   Eye Exam Up to date   Exercise (times per week) 6 times per week   Current Exercises Include Dancing;Other;Walking   Do you need help using the phone?  No   Are you deaf or do you have serious difficulty hearing?  No   Do you need help to go to places out of walking distance? No   Do you need help shopping? No   Do you need help preparing meals?  No   Do you need help with housework?  No   Do you need help with laundry? No   Do you need help taking your medications? No   Do you need help managing money? No   Do you ever drive or ride in a car without wearing a seat belt? No   Have you felt unusual stress, anger or loneliness in the last month? No   Who do you live with? Alone   If you need help, do you have trouble finding someone available to you? No   Have you been bothered in the last four weeks by sexual problems? No   Do you have difficulty concentrating, remembering or making decisions? No           Age-appropriate Screening Schedule:  Refer to the list below for future screening recommendations based on patient's age, sex and/or medical conditions. Orders for these recommended tests are listed in the plan section. The patient has been provided with a written plan.    Health Maintenance List  Health Maintenance   Topic Date Due    INFLUENZA VACCINE  07/01/2024    LIPID PANEL  01/03/2025    COVID-19 Vaccine (9 - 2024-25 season) 03/28/2025    MAMMOGRAM  09/18/2025    DXA SCAN  02/14/2026    ANNUAL WELLNESS VISIT  03/11/2026    COLORECTAL CANCER SCREENING  04/07/2026    PAP SMEAR  05/08/2026    TDAP/TD VACCINES (3 - Td or Tdap) 09/17/2030    HEPATITIS C SCREENING  Completed     Pneumococcal Vaccine 50+  Completed    ZOSTER VACCINE  Completed                                                                                                                                              Immunization History   Administered Date(s) Administered    Arexvy (RSV, Adults 60+ yrs) 01/04/2024    COVID-19 (MODERNA) 12YRS+ (SPIKEVAX) 09/22/2023    COVID-19 (MODERNA) BIVALENT 12+YRS 10/30/2022    COVID-19 (PFIZER) 12YRS+ (COMIRNATY) 09/28/2024    COVID-19 (PFIZER) BIVALENT 12+YRS 06/22/2023    COVID-19 (PFIZER) Purple Cap Monovalent 02/13/2021, 03/06/2021, 10/04/2021    Covid-19 (Pfizer) Gray Cap Monovalent 08/04/2022    Fluad Quad 65+ 10/24/2021, 10/05/2022, 10/30/2023    Fluzone  >6mos 11/19/2010    Fluzone (or Fluarix & Flulaval for VFC) >6mos 10/22/2019, 09/17/2020    Fluzone Quad >6mos (Multi-dose) 11/20/2015, 11/15/2016, 10/17/2017    Hepatitis A 10/16/2018, 04/16/2019    Pneumococcal Conjugate 20-Valent (PCV20) 12/22/2022    Pneumococcal Polysaccharide (PPSV23) 12/14/2021    Shingrix 04/01/2022, 10/17/2022    Tdap 06/08/2009, 09/17/2020       CMS Preventative Services Quick Reference  Risk Factors Identified During Encounter  Immunizations Discussed/Encouraged: Influenza and COVID19    The above risks/problems have been discussed with the patient.  Pertinent information has been shared with the patient in the After Visit Summary.  An After Visit Summary and PPPS were made available to the patient.    Follow Up:   Next Medicare Wellness visit to be scheduled in 1 year.         Additional E&M Note during same encounter follows:  Patient has additional, significant, and separately identifiable condition(s)/problem(s) that require work above and beyond the Medicare Wellness Visit     Chief Complaint  Medicare Wellness-subsequent    Subjective    HPI  Keshav is also being seen today for an annual adult preventative physical exam.        The patient is a 70-year-old female presenting for a Medicare  wellness visit.    She perceives her physical health to be slightly improved compared to the previous year, attributing this to an increase in exercise. She has not been hospitalized within the past year.  She does not take aspirin supplements and does not have an advanced care plan in place. She reports no memory concerns but admits to occasional difficulty recalling names. She engages in cognitive exercises such as reading, writing, and crossword puzzles. She participates in three dance classes and yoga twice weekly, exceeding the recommended moderate exercise. Despite this, she reports no noticeable improvement in her physical health. She attributes her weight loss to a recent influenza infection. Her lifetime risk of breast cancer  is 7.1 percent. She reports no other changes in her health status.    She continues to experience a cough following a recent influenza infection. She experienced fatigue for approximately one month post-infection, which gradually improved. She noticed a decrease in her stamina, particularly when ascending stairs, but this has since improved. She also noted increased fatigue during dance classes post-infection.    She experienced lightheadedness yesterday while bending over to assist a neighbor with tree removal but reports no dizziness. She did not experience any lightheadedness during her yoga class today.    She takes losartan in the evening for blood pressure management but does not monitor her blood pressure at home due to a perceived inaccuracy in her device.    She uses Flonase year-round for allergy management and alternates between Zyrtec and Claritin.    She takes atorvastatin at night for cholesterol management.    She finds Lexapro beneficial for her mental health and wishes to continue its use.    MEDICATIONS  Current: losartan, Flonase, atorvastatin, Lexapro, Zyrtec, Claritin    IMMUNIZATIONS  She is current on her pneumonia vaccine, RSV vaccine, tetanus vaccine, shingles  "vaccine, and had a COVID booster in the fall.            Objective   Vital Signs:  /76 (BP Location: Left arm, Patient Position: Sitting, Cuff Size: Adult)   Pulse 76   Ht 180.3 cm (70.98\")   Wt 74.9 kg (165 lb 3 oz)   SpO2 96%   BMI 23.05 kg/m²   Physical Exam  Constitutional:       General: She is not in acute distress.     Appearance: Normal appearance. She is not ill-appearing, toxic-appearing or diaphoretic.   HENT:      Right Ear: Tympanic membrane and ear canal normal. There is no impacted cerumen.      Left Ear: Tympanic membrane and ear canal normal. There is no impacted cerumen.      Nose: No congestion or rhinorrhea.      Mouth/Throat:      Mouth: Mucous membranes are moist.      Pharynx: No oropharyngeal exudate or posterior oropharyngeal erythema.   Eyes:      General:         Right eye: No discharge.         Left eye: No discharge.      Conjunctiva/sclera: Conjunctivae normal.   Neck:      Thyroid: No thyromegaly.   Cardiovascular:      Rate and Rhythm: Normal rate and regular rhythm.   Pulmonary:      Effort: Pulmonary effort is normal.      Breath sounds: Normal breath sounds.   Abdominal:      Palpations: Abdomen is soft. There is no hepatomegaly.      Tenderness: There is no abdominal tenderness.   Musculoskeletal:      Cervical back: Neck supple.   Lymphadenopathy:      Head:      Right side of head: No submandibular, preauricular or posterior auricular adenopathy.      Left side of head: No submandibular, preauricular or posterior auricular adenopathy.      Cervical: No cervical adenopathy.   Skin:     General: Skin is warm.   Neurological:      Mental Status: She is alert and oriented to person, place, and time.      Gait: Gait normal.   Psychiatric:         Mood and Affect: Mood normal.         Behavior: Behavior normal.         Thought Content: Thought content normal.         Judgment: Judgment normal.                         Results             Assessment and Plan Additional age " appropriate preventative wellness advice topics were discussed during today's preventative wellness exam(some topics already addressed during AWV portion of the note above):    Physical Activity: Advised cardiovascular activity 150 minutes per week as tolerated. (example brisk walk for 30 minutes, 5 days a week).     Healthy Weight: Discussed current and goal BMI with patient. Steps to attain this goal discussed. Information shared in after visit summary.  Diagnoses and all orders for this visit:    1. Medicare annual wellness visit, subsequent (Primary)    2. Dyslipidemia  -     Comprehensive Metabolic Panel; Future  -     Lipid Panel; Future  -     atorvastatin (LIPITOR) 10 MG tablet; Take 1 tablet by mouth Every Night.  Dispense: 90 tablet; Refill: 3    3. Essential hypertension  -     Comprehensive Metabolic Panel; Future  -     Lipid Panel; Future  -     TSH Rfx On Abnormal To Free T4; Future  -     losartan (COZAAR) 50 MG tablet; Take 1 tablet by mouth Daily.  Dispense: 90 tablet; Refill: 1    4. Screening for deficiency anemia  -     CBC (No Diff); Future    5. Non-seasonal allergic rhinitis, unspecified trigger  -     fluticasone (Flonase) 50 MCG/ACT nasal spray; Administer 2 sprays into the nostril(s) as directed by provider Daily.  Dispense: 48 g; Refill: 3    6. Recurrent major depressive disorder, in full remission    7. Elevated fasting glucose  -     Hemoglobin A1c; Future           1. Medicare wellness visit.  Her physical health has shown slight improvement due to increased exercise, although she experienced a significant setback from a recent flu infection. Her mental health remains stable compared to the previous year. She has not been hospitalized in the past year. She does not take an aspirin supplement and does not have an advanced care plan. She engages in cognitive exercises such as reading and crossword puzzles. Her physical activity includes three dance classes and yoga twice a week. She has  noticed weight loss, likely due to the flu. Her last colonoscopy was in 2021, with a 5-year repeat recommendation. Her mammogram in September was normal, though she has dense breasts. Her lifetime risk of breast cancer is 7.1%, not requiring additional testing like a breast MRI. She is up to date on her pneumonia, RSV, tetanus, and shingles vaccines. She is advised to consider a COVID-19 booster in late March or early April and a flu vaccine in the fall. Blood work will be ordered. She is encouraged to complete an advanced care plan.    2.  Hypertension.  Her blood pressure is well-controlled at 116/76 mmHg. A prescription for losartan will be sent to the [facility]. She may be experiencing orthostatic hypotension, which can be exacerbated by blood pressure medication and dehydration. She is advised to get a new blood pressure machine to monitor her blood pressure at home. If she experiences lightheadedness, she should avoid overexertion, increase water and electrolyte intake, and hold her blood pressure medication if symptoms occur at night.    3. Allergies.  A prescription for Flonase will be sent to the [facility] pharmacy. She uses Flonase year-round and occasionally takes over-the-counter antihistamines like Claritin or Zyrtec.    4.  Hyperlipidemia  A prescription for atorvastatin will be sent to the [facility] pharmacy. She takes atorvastatin at night.    5.  Depression  She is currently on Lexapro, which she finds beneficial for her mental health. She prefers to continue on the current dose.    Follow-up  The patient will follow up in 6 months for a blood pressure recheck and in 1 year for a wellness physical, or sooner if any health issues arise.    PROCEDURE           Follow Up   Return in about 6 months (around 9/11/2025) for Office visit HTN AND , MWV and fasting labs.  Patient was given instructions and counseling regarding her condition or for health maintenance advice. Please see specific information  pulled into the AVS if appropriate.  Patient or patient representative verbalized consent for the use of Ambient Listening during the visit with  Vonda Barajas MD for chart documentation. 3/11/2025  10:32 EDT    Electronically signed by Vonda Barajas MD, 03/11/25, 10:36 AM EDT.

## 2025-03-12 LAB
HBA1C MFR BLD: 5.7 % (ref 4.8–5.6)
TSH SERPL DL<=0.05 MIU/L-ACNC: 2.1 UIU/ML (ref 0.27–4.2)

## 2025-03-26 ENCOUNTER — HOSPITAL ENCOUNTER (OUTPATIENT)
Dept: CT IMAGING | Facility: HOSPITAL | Age: 70
Discharge: HOME | End: 2025-03-26
Admitting: INTERNAL MEDICINE
Payer: MEDICARE

## 2025-03-26 DIAGNOSIS — I77.810 AORTIC ECTASIA, THORACIC: ICD-10-CM

## 2025-03-26 PROCEDURE — 25510000001 IOPAMIDOL 61 % SOLUTION: Performed by: INTERNAL MEDICINE

## 2025-03-26 PROCEDURE — 71270 CT THORAX DX C-/C+: CPT

## 2025-03-26 RX ORDER — IOPAMIDOL 612 MG/ML
100 INJECTION, SOLUTION INTRAVASCULAR
Status: COMPLETED | OUTPATIENT
Start: 2025-03-26 | End: 2025-03-26

## 2025-03-26 RX ADMIN — IOPAMIDOL 85 ML: 612 INJECTION, SOLUTION INTRAVENOUS at 13:23

## 2025-04-01 ENCOUNTER — OFFICE VISIT (OUTPATIENT)
Dept: CARDIOLOGY | Facility: CLINIC | Age: 70
End: 2025-04-01
Payer: MEDICARE

## 2025-04-01 VITALS
OXYGEN SATURATION: 99 % | HEART RATE: 82 BPM | WEIGHT: 167 LBS | SYSTOLIC BLOOD PRESSURE: 124 MMHG | BODY MASS INDEX: 23.91 KG/M2 | HEIGHT: 70 IN | DIASTOLIC BLOOD PRESSURE: 82 MMHG

## 2025-04-01 DIAGNOSIS — I10 ESSENTIAL HYPERTENSION: Chronic | ICD-10-CM

## 2025-04-01 DIAGNOSIS — I77.810 AORTIC ECTASIA, THORACIC: Primary | ICD-10-CM

## 2025-04-01 DIAGNOSIS — E78.5 DYSLIPIDEMIA: ICD-10-CM

## 2025-04-01 DIAGNOSIS — I71.21 ANEURYSM OF ASCENDING AORTA WITHOUT RUPTURE: ICD-10-CM

## 2025-04-01 RX ORDER — METOPROLOL SUCCINATE 25 MG/1
25 TABLET, EXTENDED RELEASE ORAL DAILY
Qty: 90 TABLET | Refills: 3 | Status: SHIPPED | OUTPATIENT
Start: 2025-04-01

## 2025-04-01 NOTE — PROGRESS NOTES
[facility]  Office visit  Keshav Gu  1955  [phone]  There is no work phone number on file.    VISIT DATE:  4/1/2025    PCP: Vonda Barajas MD  [address]  [facility] [id number]    CC:  Chief Complaint   Patient presents with    Aortic ectasia, thoracic     Follow up       Previous cardiac studies and procedures:  May 2023 Holter, 7-day    A relatively benign monitor study.    Short SVT runs.  Longest lasting 13 beats    PACs less than 1%.    August 2023 CT abdomen pelvis: Normal abdominal aorta    March 2024 CT chest  Minimal aortic atherosclerotic calcifications are present. The ascending aorta is ectatic measuring 3.8 cm.     March 2025 CT chest with contrast  ascending thoracic aorta measuring up to 4.2 cm     ASSESSMENT:   Diagnosis Plan   1. Aortic ectasia, thoracic        2. Dyslipidemia        3. Essential hypertension        4. Aneurysm of ascending aorta without rupture            PLAN:  Thoracic aortic aneurysm: 4.2 cm, small.  Discussed physical restrictions.  CT surveillance in 1 year.  Potential family history in paternal uncle, recommending genetic counseling.  Starting low-dose beta-blockade.    Hypertension: Goal less than 130/80 mmHg.  Ideally less than 120/80 mmHg.  Continue current medical therapy.  Starting low-dose beta-blockade.    Palpitations: Secondary to supraventricular ectopy.  Not significantly affecting quality of life.  Limited burden of arrhythmia.       Dyslipidemia: Goal LDL is 100.  Agree with current medical therapy.    Subjective  Interval assessment: No change in baseline functional capacity.  Reviewed recent laboratory evaluation which revealed excellent lipid profile and development of prediabetes.   has not been able to keep track of blood pressures at home recently, needs a new blood pressure cuff.  Compliant with medical therapy.    Initial evaluation: 69-year-old female with history of intermittent  "palpitations, hypertension and dyslipidemia. Recent incidental finding of aortic ectasia measuring 3.8 cm on CT scan obtained to follow-up pulmonary nodules. She denies chest discomfort, dyspnea. Intermittent brief palpitations which tend to match up with supraventricular ectopy, can be triggered by stress and anxiety. She exercise on a regular basis without limitations. Blood pressures running less than 130/80 mmHg. She is compliant with medical therapy. No first-degree relatives with history of aneurysmal disease.     PHYSICAL EXAMINATION:  Vitals:    04/01/25 0952   BP: 124/82   BP Location: Right arm   Patient Position: Sitting   Cuff Size: Adult   Pulse: 82   SpO2: 99%   Weight: 75.8 kg (167 lb)   Height: 177.8 cm (70\")     General Appearance:    Alert, cooperative, no distress, appears stated age   Head:    Normocephalic, without obvious abnormality, atraumatic   Eyes:    conjunctiva/corneas clear   Nose:   Nares normal, septum midline, mucosa normal, no drainage   Throat:   Lips, teeth and gums normal   Neck:   Supple, symmetrical, trachea midline, no carotid    bruit or JVD   Lungs:     Clear to auscultation bilaterally, respirations unlabored   Chest Wall:    No tenderness or deformity    Heart:    Regular rate and rhythm, S1 and S2 normal, no murmur, rub   or gallop, normal carotid impulse bilaterally without bruit.   Abdomen:     Soft, non-tender   Extremities:   Extremities normal, atraumatic, no cyanosis or edema   Pulses:   2+ and symmetric all extremities   Skin:   Skin color, texture, turgor normal, no rashes or lesions       Diagnostic Data:  Procedures  Lab Results   Component Value Date    CHLPL 172 01/03/2024    TRIG 32 03/11/2025    HDL 74 (H) 03/11/2025     Lab Results   Component Value Date    GLUCOSE 91 03/11/2025    BUN 18 03/11/2025    CREATININE 0.82 03/11/2025     03/11/2025    K 4.0 03/11/2025     03/11/2025    CO2 25.9 03/11/2025     Lab Results   Component Value Date    " HGBA1C 5.70 (H) 03/11/2025     Lab Results   Component Value Date    WBC 5.72 03/11/2025    HGB 13.3 03/11/2025    HCT 40.1 03/11/2025     03/11/2025       Allergies  Allergies   Allergen Reactions    Macrobid [Nitrofurantoin Macrocrystal] Itching and Swelling       Current Medications    Current Outpatient Medications:     atorvastatin (LIPITOR) 10 MG tablet, Take 1 tablet by mouth Every Night., Disp: 90 tablet, Rfl: 3    escitalopram (LEXAPRO) 5 MG tablet, TAKE 1 TABLET BY MOUTH DAILY, Disp: 90 tablet, Rfl: 3    fluticasone (Flonase) 50 MCG/ACT nasal spray, Administer 2 sprays into the nostril(s) as directed by provider Daily., Disp: 48 g, Rfl: 3    Loratadine (Claritin) 10 MG capsule, Take  by mouth As Needed., Disp: , Rfl:     losartan (COZAAR) 50 MG tablet, Take 1 tablet by mouth Daily., Disp: 90 tablet, Rfl: 1    multivitamin (THERAGRAN) tablet tablet, Take  by mouth Daily., Disp: , Rfl:     VITAMIN D PO, Take 1 capsule by mouth Daily., Disp: , Rfl:     metoprolol succinate XL (TOPROL-XL) 25 MG 24 hr tablet, Take 1 tablet by mouth Daily., Disp: 90 tablet, Rfl: 3          ROS  ROS      SOCIAL HX  Social History     Socioeconomic History    Marital status:    Tobacco Use    Smoking status: Never     Passive exposure: Never    Smokeless tobacco: Never   Vaping Use    Vaping status: Never Used   Substance and Sexual Activity    Alcohol use: Yes     Alcohol/week: 1.0 standard drink of alcohol     Types: 1 Glasses of wine per week     Comment: Less than weekly, occaisonally    Drug use: No    Sexual activity: Not Currently     Partners: Male     Birth control/protection: Abstinence       FAMILY HX  Family History   Problem Relation Age of Onset    Hyperlipidemia Mother     Heart attack Mother     Colon cancer Mother     Mental illness Mother         OCD and depression    Arthritis Mother     Heart disease Mother         HCM    Cancer Mother         Not definitely diagnosed probably colon    Depression  Mother     Stomach cancer Father     Hypertension Father     Cancer Father         Stomach cancer    Stroke Maternal Grandmother     Hypertension Maternal Grandmother         Stroke    Stroke Maternal Grandfather     Mental illness Maternal Grandfather     Hypertension Maternal Grandfather         Stroke    Stroke Paternal Grandmother     Hypertension Paternal Grandmother         Stroke    Stroke Paternal Grandfather     Hypertension Paternal Grandfather         Stroke    Atrial fibrillation Other     Breast cancer Neg Hx     Ovarian cancer Neg Hx              Samir Watkins III, MD, FACC

## 2025-04-11 ENCOUNTER — OFFICE VISIT (OUTPATIENT)
Age: 70
End: 2025-04-11
Payer: MEDICARE

## 2025-04-11 VITALS
DIASTOLIC BLOOD PRESSURE: 72 MMHG | WEIGHT: 163.4 LBS | HEART RATE: 54 BPM | RESPIRATION RATE: 16 BRPM | TEMPERATURE: 97.1 F | SYSTOLIC BLOOD PRESSURE: 114 MMHG | HEIGHT: 70 IN | OXYGEN SATURATION: 100 % | BODY MASS INDEX: 23.39 KG/M2

## 2025-04-11 DIAGNOSIS — I10 ESSENTIAL HYPERTENSION: Primary | Chronic | ICD-10-CM

## 2025-04-11 DIAGNOSIS — M25.562 ACUTE PAIN OF LEFT KNEE: ICD-10-CM

## 2025-04-11 NOTE — PROGRESS NOTES
Chief Complaint  Knee Pain (L knee), Ankle Pain (R ankle), and Hypertension (Discuss cardiologist visit)    Subjective        Keshav Gu presents to [facility]    Check on knee discomfort  Symptoms are: new.   Onset was 1 to 6 months.   Symptoms occur: intermittently.  Symptoms include: joint pain.   Pertinent negative symptoms include no abdominal pain, no anorexia, no change in stool, no chest pain, no chills, no congestion, no cough, no diaphoresis, no fatigue, no fever, no headaches, no joint swelling, no myalgias, no nausea, no neck pain, no numbness, no rash, no sore throat, no swollen glands, no dysuria, no vertigo, no visual change, no vomiting and no weakness.   Treatment and/or Medications comments include: None       History of Present Illness  The patient is a 70-year-old female presenting for left knee pain, right ankle pain, hypertension, and a cardiology follow-up.    She reports an incident approximately 2 weeks ago where she experienced discomfort in her left knee during a side lunge exercise. The pain was localized to the front of her knee, specifically on the right side of the patella. She has been managing the pain with ice and heat applications.    Additionally, she mentions soreness in her right ankle, which does not radiate to the heel or the bottom of her foot. She has been using Voltaren gel for relief. She typically wears shoes indoors and has a history of plantar fasciitis.    She visited her cardiologist last week, who prescribed a beta-blocker due to slightly elevated blood pressure readings compared to previous visits. She has not started the medication yet due to concerns about potential side effects such as fatigue and dizziness. She felt her blood pressure was well-managed but the cardiologist wanted it even lower because of the aneurysm with a goal less than 120/80.  She has a home blood pressure monitor, which recorded readings of 124/89 two  "mornings ago and 138/91 the previous night. She is considering whether to increase her current antihypertensive medication or start the beta-blocker.    MEDICATIONS  Current: losartan    Objective   Vital Signs:  /72 (BP Location: Left arm, Patient Position: Sitting, Cuff Size: Adult)   Pulse 54   Temp 97.1 °F (36.2 °C) (Infrared)   Resp 16   Ht 177.8 cm (70\")   Wt 74.1 kg (163 lb 6.4 oz)   SpO2 100%   BMI 23.45 kg/m²   Estimated body mass index is 23.45 kg/m² as calculated from the following:    Height as of this encounter: 177.8 cm (70\").    Weight as of this encounter: 74.1 kg (163 lb 6.4 oz).    BMI is within normal parameters. No other follow-up for BMI required.    Vitals:    04/11/25 0932   BP: 114/72   BP Location: Left arm   Patient Position: Sitting   Cuff Size: Adult   Pulse: 54   Resp: 16   Temp: 97.1 °F (36.2 °C)   TempSrc: Infrared   SpO2: 100%   Weight: 74.1 kg (163 lb 6.4 oz)   Height: 177.8 cm (70\")   PainSc: 1    PainLoc: Knee  Comment: 2/10 for ankle         The following portions of the patient's history were reviewed and updated as appropriate: allergies, current medications, past family history, past medical history, past social history, past surgical history, and problem list.       Physical Exam  Vitals reviewed.   Constitutional:       General: She is not in acute distress.     Appearance: She is not ill-appearing.   Cardiovascular:      Rate and Rhythm: Normal rate and regular rhythm.      Heart sounds: No murmur heard.  Pulmonary:      Effort: Pulmonary effort is normal.      Breath sounds: Normal breath sounds.   Musculoskeletal:      Left knee: No swelling, deformity, effusion, erythema or ecchymosis. Normal range of motion. Tenderness present over the medial joint line and patellar tendon. No lateral joint line, MCL, LCL, ACL or PCL tenderness. No LCL laxity, MCL laxity, ACL laxity or PCL laxity.Normal meniscus and normal patellar mobility. Normal pulse.      Instability " Tests: Anterior drawer test negative. Anterior Lachman test negative. Medial Addy test negative and lateral Addy test negative.      Right ankle: No swelling, deformity or ecchymosis. No tenderness. Normal range of motion. Anterior drawer test negative.      Right Achilles Tendon: No tenderness or defects. Pearson's test negative.   Neurological:      Mental Status: She is alert.      Gait: Gait normal.   Psychiatric:         Mood and Affect: Mood normal.        Result Review :  The following data was reviewed by: Vonda Barajas MD on 04/11/2025:            Office Visit with Samir Watkins III, MD (04/01/2025)   CT Chest With & Without Contrast Diagnostic (03/26/2025 13:23)     Assessment and Plan   Diagnoses and all orders for this visit:    1. Essential hypertension (Primary)    2. Acute pain of left knee             Assessment & Plan  1. Hypertension.  Her home blood pressure machine was checked against a manual blood pressure machine, showing a few points higher on the diastolic range. Her goal blood pressure is less than 120/80 as determined by her cardiologist. She is currently taking losartan 50 mg daily. It was recommended to add metoprolol succinate 25 mg daily, which she has not yet initiated. Discussed that beta-blockers can have other benefits in addition to blood pressure monitoring, also will need to monitor for side effects with the new medication. She is requested to check home blood pressure readings over the next 2 weeks and send a report via [employer]. If her home blood pressure readings are at goal, then losartan will be continued only. If her blood pressure readings are higher than target, then starting metoprolol will be considered.    2. Left knee pain.  The pain started after exercise, possibly due to inflammation of the patella area or a mild exacerbation of arthritis given her age. There is no significant swelling or heat in the knee. Ice and Voltaren gel up to four times a day are  recommended for pain relief, inflammation, and to reduce swelling. She should avoid using a heating pad over the medicated area. If swelling or new symptoms develop, she should return for further evaluation.    3. Right lower ankle pain.  The pain is not reproducible on exam but is likely due to inflammation of one of the tendons. There is no tenderness at the malleolus, swelling, or loose or out-of-place structures. Ice and Voltaren gel are recommended. If the pain worsens, she should wear supportive athletic shoes and limit barefoot time.          Follow Up   Return in about 5 months (around 9/17/2025) for as scheduled.  Patient was given instructions and counseling regarding her condition or for health maintenance advice. Please see specific information pulled into the AVS if appropriate.         Patient or patient representative verbalized consent for the use of Ambient Listening during the visit with  Vonda Barajas MD for chart documentation. 4/11/2025  10:17 EDT    Electronically signed by Vonda Barajas MD, 04/11/25, 10:17 AM EDT.

## 2025-05-05 ENCOUNTER — PATIENT MESSAGE (OUTPATIENT)
Age: 70
End: 2025-05-05
Payer: MEDICARE

## 2025-05-14 ENCOUNTER — TRANSCRIBE ORDERS (OUTPATIENT)
Dept: ADMINISTRATIVE | Facility: HOSPITAL | Age: 70
End: 2025-05-14
Payer: MEDICARE

## 2025-05-14 DIAGNOSIS — N63.20 MASS OF LEFT BREAST, UNSPECIFIED QUADRANT: Primary | ICD-10-CM

## 2025-05-15 ENCOUNTER — PATIENT MESSAGE (OUTPATIENT)
Age: 70
End: 2025-05-15
Payer: MEDICARE

## 2025-05-19 ENCOUNTER — TRANSCRIBE ORDERS (OUTPATIENT)
Dept: ADMINISTRATIVE | Facility: HOSPITAL | Age: 70
End: 2025-05-19
Payer: MEDICARE

## 2025-05-19 DIAGNOSIS — N63.24 MASS OF LOWER INNER QUADRANT OF LEFT BREAST: ICD-10-CM

## 2025-05-19 DIAGNOSIS — N63.20 MASS OF LEFT BREAST, UNSPECIFIED QUADRANT: Primary | ICD-10-CM

## 2025-05-29 ENCOUNTER — HOSPITAL ENCOUNTER (OUTPATIENT)
Facility: HOSPITAL | Age: 70
Discharge: HOME | End: 2025-05-29
Payer: MEDICARE

## 2025-05-29 DIAGNOSIS — N63.24 MASS OF LOWER INNER QUADRANT OF LEFT BREAST: ICD-10-CM

## 2025-05-29 PROCEDURE — G0279 TOMOSYNTHESIS, MAMMO: HCPCS

## 2025-05-29 PROCEDURE — 77065 DX MAMMO INCL CAD UNI: CPT

## 2025-05-29 PROCEDURE — 76642 ULTRASOUND BREAST LIMITED: CPT

## 2025-07-24 ENCOUNTER — OFFICE VISIT (OUTPATIENT)
Age: 70
End: 2025-07-24
Payer: MEDICARE

## 2025-07-24 VITALS
HEART RATE: 79 BPM | BODY MASS INDEX: 23.13 KG/M2 | OXYGEN SATURATION: 97 % | HEIGHT: 70 IN | SYSTOLIC BLOOD PRESSURE: 122 MMHG | WEIGHT: 161.6 LBS | DIASTOLIC BLOOD PRESSURE: 84 MMHG

## 2025-07-24 DIAGNOSIS — R07.9 LEFT-SIDED CHEST PAIN: Primary | ICD-10-CM

## 2025-07-24 PROCEDURE — 1125F AMNT PAIN NOTED PAIN PRSNT: CPT | Performed by: NURSE PRACTITIONER

## 2025-07-24 PROCEDURE — 3074F SYST BP LT 130 MM HG: CPT | Performed by: NURSE PRACTITIONER

## 2025-07-24 PROCEDURE — 3079F DIAST BP 80-89 MM HG: CPT | Performed by: NURSE PRACTITIONER

## 2025-07-24 PROCEDURE — 99213 OFFICE O/P EST LOW 20 MIN: CPT | Performed by: NURSE PRACTITIONER

## 2025-07-24 RX ORDER — METHOCARBAMOL 500 MG/1
500 TABLET, FILM COATED ORAL 4 TIMES DAILY
Qty: 30 TABLET | Refills: 0 | Status: SHIPPED | OUTPATIENT
Start: 2025-07-24

## 2025-07-24 RX ORDER — ESTRADIOL 0.1 MG/G
CREAM VAGINAL
COMMUNITY
Start: 2025-06-25

## 2025-07-24 NOTE — PROGRESS NOTES
"Chief Complaint  Breast Pain (L)    Subjective        Keshav Gu presents to [facility]  History of Present Illness    History of Present Illness  The patient presents for evaluation of breast pain.    She reports experiencing new-onset breast pain, which she first noticed around the 4th of July weekend. The pain is described as deep and muscular, located in the upper quadrant of her breast. It is not constant and does not seem to be associated with movement. She recalls straining her back while moving a heavy chair on the Saturday following the 4th of July, but this discomfort has since improved. She suspects that she may have strained her pectoral muscle during this incident. The pain appears to be positional, as she notices it more when lying in bed, particularly when sleeping on her side with her arm over her body. She also mentions that she performed push-ups after returning home, which may have exacerbated the pain. She reports not performing regular breast self-exams due to the density of her breasts, which makes it difficult for her to discern any abnormalities. She has not taken any ibuprofen or other medications for the pain. She had a normal mammogram in 05/2025. She has no strong family history of breast cancer.    Sleep: She reports noticing the pain more when lying in bed, particularly when sleeping on her side with her arm over her body.    FAMILY HISTORY  She has no strong family history of breast cancer.    Results  Imaging   - Mammogram: 05/2025, Normal   - Ultrasound of the left breast: Normal     Objective   Vital Signs:  /84 (BP Location: Right arm, Patient Position: Sitting, Cuff Size: Adult)   Pulse 79   Ht 177.8 cm (70\")   Wt 73.3 kg (161 lb 9.6 oz)   SpO2 97%   BMI 23.19 kg/m²   Estimated body mass index is 23.19 kg/m² as calculated from the following:    Height as of this encounter: 177.8 cm (70\").    Weight as of this encounter: 73.3 kg (161 " lb 9.6 oz).    BMI is within normal parameters. No other follow-up for BMI required.    Physical Exam  Vitals reviewed.   Constitutional:       Appearance: Normal appearance.   HENT:      Nose: Nose normal.      Mouth/Throat:      Mouth: Mucous membranes are moist.      Pharynx: Oropharynx is clear.   Eyes:      Conjunctiva/sclera: Conjunctivae normal.   Cardiovascular:      Rate and Rhythm: Normal rate and regular rhythm.      Heart sounds: Normal heart sounds.   Pulmonary:      Effort: Pulmonary effort is normal.      Breath sounds: Normal breath sounds.   Chest:   Breasts:     Right: Normal.      Left: Normal.   Musculoskeletal:         General: Normal range of motion.      Cervical back: Normal range of motion.   Skin:     General: Skin is warm.   Neurological:      Mental Status: She is alert and oriented to person, place, and time.   Psychiatric:         Mood and Affect: Mood normal.         Behavior: Behavior normal.         Thought Content: Thought content normal.        Result Review :                  Assessment and Plan   Diagnoses and all orders for this visit:    1. Left-sided chest pain (Primary)  -     methocarbamol (ROBAXIN) 500 MG tablet; Take 1 tablet by mouth 4 (Four) Times a Day.  Dispense: 30 tablet; Refill: 0        Assessment & Plan  1. Breast pain.  - The patient's breast pain is likely due to a strain in the pectoral muscle, possibly exacerbated by push-ups.  - Physical examination revealed tenderness in the upper quadrant of the breast, but no abnormal masses were detected. The patient has dense breast tissue.  - Discussed conservative treatment with the patient, including avoiding heavy lifting and strenuous activities for the next couple of weeks.  - Prescribed a muscle relaxer and advised the use of Tylenol for additional pain relief. If no improvement is noted within 2 weeks, reassessment will be necessary.       The following portions of the patient's history were reviewed and updated  as appropriate: allergies, current medications, past family history, past medical history, past social history, past surgical history, and problem list.       Follow Up   No follow-ups on file.  Patient was given instructions and counseling regarding her condition or for health maintenance advice. Please see specific information pulled into the AVS if appropriate.     Patient or patient representative verbalized consent for the use of Ambient Listening during the visit with  LUCIANA Lowery for chart documentation. 7/24/2025  10:10 EDT

## 2025-08-05 ENCOUNTER — TRANSCRIBE ORDERS (OUTPATIENT)
Dept: ADMINISTRATIVE | Facility: HOSPITAL | Age: 70
End: 2025-08-05
Payer: MEDICARE

## 2025-08-05 DIAGNOSIS — Z12.31 ENCOUNTER FOR SCREENING MAMMOGRAM FOR MALIGNANT NEOPLASM OF BREAST: Primary | ICD-10-CM
